# Patient Record
Sex: FEMALE | Race: WHITE | NOT HISPANIC OR LATINO | Employment: OTHER | URBAN - METROPOLITAN AREA
[De-identification: names, ages, dates, MRNs, and addresses within clinical notes are randomized per-mention and may not be internally consistent; named-entity substitution may affect disease eponyms.]

---

## 2017-01-16 ENCOUNTER — OFFICE VISIT (OUTPATIENT)
Dept: FAMILY MEDICINE | Facility: CLINIC | Age: 47
End: 2017-01-16
Payer: MEDICAID

## 2017-01-16 VITALS
DIASTOLIC BLOOD PRESSURE: 60 MMHG | TEMPERATURE: 99 F | OXYGEN SATURATION: 97 % | WEIGHT: 126.75 LBS | HEART RATE: 97 BPM | SYSTOLIC BLOOD PRESSURE: 106 MMHG | BODY MASS INDEX: 23.32 KG/M2 | HEIGHT: 62 IN

## 2017-01-16 DIAGNOSIS — J30.89 NON-SEASONAL ALLERGIC RHINITIS DUE TO OTHER ALLERGIC TRIGGER: ICD-10-CM

## 2017-01-16 DIAGNOSIS — J02.9 PHARYNGITIS, UNSPECIFIED ETIOLOGY: Primary | ICD-10-CM

## 2017-01-16 PROCEDURE — 99213 OFFICE O/P EST LOW 20 MIN: CPT | Mod: 25,S$GLB,, | Performed by: NURSE PRACTITIONER

## 2017-01-16 PROCEDURE — 96372 THER/PROPH/DIAG INJ SC/IM: CPT | Mod: S$GLB,,, | Performed by: NURSE PRACTITIONER

## 2017-01-16 RX ORDER — TRIAMCINOLONE ACETONIDE 40 MG/ML
80 INJECTION, SUSPENSION INTRA-ARTICULAR; INTRAMUSCULAR
Status: COMPLETED | OUTPATIENT
Start: 2017-01-16 | End: 2017-01-16

## 2017-01-16 RX ORDER — CODEINE PHOSPHATE AND GUAIFENESIN 10; 100 MG/5ML; MG/5ML
5 SOLUTION ORAL 3 TIMES DAILY PRN
Qty: 180 ML | Refills: 0 | Status: SHIPPED | OUTPATIENT
Start: 2017-01-16 | End: 2017-01-19 | Stop reason: SDUPTHER

## 2017-01-16 RX ORDER — FOLIC ACID 1 MG/1
TABLET ORAL
Refills: 2 | COMMUNITY
Start: 2016-12-26 | End: 2017-03-09 | Stop reason: SDUPTHER

## 2017-01-16 RX ORDER — AMOXICILLIN 875 MG/1
875 TABLET, FILM COATED ORAL 2 TIMES DAILY
Qty: 20 TABLET | Refills: 0 | Status: SHIPPED | OUTPATIENT
Start: 2017-01-16 | End: 2017-01-26

## 2017-01-16 RX ADMIN — TRIAMCINOLONE ACETONIDE 80 MG: 40 INJECTION, SUSPENSION INTRA-ARTICULAR; INTRAMUSCULAR at 10:01

## 2017-01-16 NOTE — PROGRESS NOTES
Subjective:       Patient ID: Ayana Anthony is a 47 y.o. female.    Chief Complaint: Sore Throat; Cough; and Nasal Congestion    Sore Throat    The current episode started in the past 7 days. The problem has been unchanged. Neither side of throat is experiencing more pain than the other. The maximum temperature recorded prior to her arrival was 102 - 102.9 F. The fever has been present for less than 1 day. The pain is at a severity of 8/10. The pain is moderate. Associated symptoms include congestion, coughing, a hoarse voice, swollen glands and trouble swallowing. Pertinent negatives include no abdominal pain, diarrhea, drooling, ear discharge, ear pain, headaches, plugged ear sensation, neck pain, shortness of breath, stridor or vomiting. She has had no exposure to strep or mono. Treatments tried: OTC cough medicine, tylenol. The treatment provided mild relief.   Cough   This is a new problem. The current episode started in the past 7 days. The problem has been unchanged. The problem occurs every few minutes. The cough is non-productive. Associated symptoms include chills, a fever, myalgias, nasal congestion, postnasal drip and a sore throat. Pertinent negatives include no chest pain, ear congestion, ear pain, headaches, heartburn, hemoptysis, rash, rhinorrhea, shortness of breath, sweats, weight loss or wheezing. The symptoms are aggravated by lying down. Treatments tried: OTC cough medicine. The treatment provided no relief. There is no history of asthma, bronchiectasis, bronchitis, COPD, emphysema, environmental allergies or pneumonia.     Review of Systems   Constitutional: Positive for chills, diaphoresis, fatigue and fever. Negative for weight loss.   HENT: Positive for congestion, hoarse voice, postnasal drip, sore throat, trouble swallowing and voice change. Negative for drooling, ear discharge, ear pain, rhinorrhea, sinus pressure and sneezing.    Eyes: Negative for photophobia and visual disturbance.    Respiratory: Positive for cough. Negative for hemoptysis, chest tightness, shortness of breath, wheezing and stridor.    Cardiovascular: Negative for chest pain, palpitations and leg swelling.   Gastrointestinal: Negative for abdominal pain, diarrhea, heartburn and vomiting.   Musculoskeletal: Positive for myalgias. Negative for neck pain and neck stiffness.   Skin: Negative for color change, pallor, rash and wound.   Allergic/Immunologic: Negative for environmental allergies.   Neurological: Negative for dizziness, weakness, light-headedness and headaches.       Objective:      Physical Exam   Constitutional: She is oriented to person, place, and time. She appears well-developed and well-nourished. No distress.   HENT:   Right Ear: Tympanic membrane and external ear normal.   Left Ear: Tympanic membrane and external ear normal.   Nose: No mucosal edema or rhinorrhea. Right sinus exhibits no maxillary sinus tenderness and no frontal sinus tenderness. Left sinus exhibits no maxillary sinus tenderness and no frontal sinus tenderness.   Mouth/Throat: Posterior oropharyngeal edema and posterior oropharyngeal erythema present. No oropharyngeal exudate.   Cardiovascular: Normal rate, regular rhythm and normal heart sounds.    Pulmonary/Chest: Effort normal and breath sounds normal.   Lymphadenopathy:        Head (right side): Submandibular adenopathy present.        Head (left side): Submandibular adenopathy present.   Neurological: She is alert and oriented to person, place, and time.   Skin: Skin is warm and dry. She is not diaphoretic.   Psychiatric: She has a normal mood and affect. Her behavior is normal. Judgment and thought content normal.   Vitals reviewed.      Assessment:       1. Pharyngitis, unspecified etiology    2. Non-seasonal allergic rhinitis due to other allergic trigger        Plan:       Pharyngitis, unspecified etiology  -     amoxicillin (AMOXIL) 875 MG tablet; Take 1 tablet (875 mg total) by  mouth 2 (two) times daily.  Dispense: 20 tablet; Refill: 0    Non-seasonal allergic rhinitis due to other allergic trigger    Other orders  -     triamcinolone acetonide injection 80 mg; Inject 2 mLs (80 mg total) into the muscle one time.  -     guaifenesin-codeine 100-10 mg/5 ml (CHERATUSSIN AC)  mg/5 mL syrup; Take 5 mLs by mouth 3 (three) times daily as needed.  Dispense: 180 mL; Refill: 0      Warm salt water gargles  Continue tylenol for fever  hydrate well

## 2017-01-19 RX ORDER — CODEINE PHOSPHATE AND GUAIFENESIN 10; 100 MG/5ML; MG/5ML
SOLUTION ORAL
Qty: 180 ML | Refills: 0 | Status: SHIPPED | OUTPATIENT
Start: 2017-01-19 | End: 2017-01-19 | Stop reason: SDUPTHER

## 2017-01-19 NOTE — TELEPHONE ENCOUNTER
Please send to [facility] in [place]     guaifenesin-codeine 100-10 mg/5 ml (TUSSI-ORGANIDIN NR)  mg/5 mL syrup

## 2017-01-21 RX ORDER — CODEINE PHOSPHATE AND GUAIFENESIN 10; 100 MG/5ML; MG/5ML
5 SOLUTION ORAL EVERY 8 HOURS PRN
Qty: 180 ML | Refills: 0 | Status: SHIPPED | OUTPATIENT
Start: 2017-01-21 | End: 2017-02-03 | Stop reason: SDUPTHER

## 2017-02-01 ENCOUNTER — OFFICE VISIT (OUTPATIENT)
Dept: FAMILY MEDICINE | Facility: CLINIC | Age: 47
End: 2017-02-01
Payer: MEDICAID

## 2017-02-01 VITALS
HEART RATE: 89 BPM | HEIGHT: 62 IN | BODY MASS INDEX: 23.82 KG/M2 | DIASTOLIC BLOOD PRESSURE: 76 MMHG | TEMPERATURE: 98 F | OXYGEN SATURATION: 97 % | SYSTOLIC BLOOD PRESSURE: 122 MMHG | WEIGHT: 129.44 LBS

## 2017-02-01 DIAGNOSIS — E55.9 VITAMIN D DEFICIENCY: ICD-10-CM

## 2017-02-01 DIAGNOSIS — M54.9 BACK PAIN, UNSPECIFIED BACK LOCATION, UNSPECIFIED BACK PAIN LATERALITY, UNSPECIFIED CHRONICITY: ICD-10-CM

## 2017-02-01 DIAGNOSIS — M54.2 CHRONIC NECK PAIN: ICD-10-CM

## 2017-02-01 DIAGNOSIS — M54.2 NECK PAIN: ICD-10-CM

## 2017-02-01 DIAGNOSIS — D50.0 IRON DEFICIENCY ANEMIA SECONDARY TO BLOOD LOSS (CHRONIC): ICD-10-CM

## 2017-02-01 DIAGNOSIS — K13.0 ANGULAR CHEILOSIS: ICD-10-CM

## 2017-02-01 DIAGNOSIS — R53.83 FATIGUE, UNSPECIFIED TYPE: ICD-10-CM

## 2017-02-01 DIAGNOSIS — D53.0 ANEMIA DUE TO PROTEIN DEFICIENCY: ICD-10-CM

## 2017-02-01 DIAGNOSIS — E53.8 B12 DEFICIENCY: ICD-10-CM

## 2017-02-01 DIAGNOSIS — G89.29 CHRONIC NECK PAIN: ICD-10-CM

## 2017-02-01 DIAGNOSIS — M54.2 CERVICALGIA: Chronic | ICD-10-CM

## 2017-02-01 DIAGNOSIS — D51.9 ANEMIA DUE TO VITAMIN B12 DEFICIENCY, UNSPECIFIED B12 DEFICIENCY TYPE: Chronic | ICD-10-CM

## 2017-02-01 DIAGNOSIS — G89.29 OTHER CHRONIC PAIN: ICD-10-CM

## 2017-02-01 DIAGNOSIS — M79.641 PAIN OF RIGHT HAND: Primary | ICD-10-CM

## 2017-02-01 DIAGNOSIS — G47.00 INSOMNIA, UNSPECIFIED TYPE: Chronic | ICD-10-CM

## 2017-02-01 DIAGNOSIS — Z12.39 BREAST CANCER SCREENING: ICD-10-CM

## 2017-02-01 DIAGNOSIS — F32.A DEPRESSION, UNSPECIFIED DEPRESSION TYPE: Chronic | ICD-10-CM

## 2017-02-01 DIAGNOSIS — D50.9 IRON DEFICIENCY ANEMIA, UNSPECIFIED IRON DEFICIENCY ANEMIA TYPE: ICD-10-CM

## 2017-02-01 DIAGNOSIS — G25.81 RLS (RESTLESS LEGS SYNDROME): Chronic | ICD-10-CM

## 2017-02-01 DIAGNOSIS — G40.309 NONINTRACTABLE GENERALIZED IDIOPATHIC EPILEPSY WITHOUT STATUS EPILEPTICUS: Chronic | ICD-10-CM

## 2017-02-01 PROCEDURE — 99214 OFFICE O/P EST MOD 30 MIN: CPT | Mod: S$GLB,,, | Performed by: FAMILY MEDICINE

## 2017-02-01 RX ORDER — ESTROGENS, CONJUGATED 1.25 MG
TABLET ORAL
Qty: 30 TABLET | Refills: 5 | Status: SHIPPED | OUTPATIENT
Start: 2017-02-01 | End: 2017-07-24 | Stop reason: SDUPTHER

## 2017-02-01 RX ORDER — DICLOFENAC SODIUM 10 MG/G
GEL TOPICAL
Qty: 100 G | Refills: 5 | Status: SHIPPED | OUTPATIENT
Start: 2017-02-01 | End: 2018-06-29

## 2017-02-01 RX ORDER — OXYCODONE HYDROCHLORIDE 30 MG/1
TABLET ORAL
Qty: 150 TABLET | Refills: 0 | Status: SHIPPED | OUTPATIENT
Start: 2017-02-01 | End: 2017-03-01 | Stop reason: SDUPTHER

## 2017-02-01 RX ORDER — CYANOCOBALAMIN 1000 UG/ML
INJECTION, SOLUTION INTRAMUSCULAR; SUBCUTANEOUS
Qty: 10 ML | Refills: 2 | Status: SHIPPED | OUTPATIENT
Start: 2017-02-01 | End: 2018-01-05 | Stop reason: SDUPTHER

## 2017-02-01 RX ORDER — ESTROGENS, CONJUGATED 1.25 MG
TABLET ORAL
Refills: 0 | COMMUNITY
Start: 2017-01-16 | End: 2017-02-01 | Stop reason: SDUPTHER

## 2017-02-01 RX ORDER — DIAZEPAM 10 MG/1
10 TABLET ORAL NIGHTLY PRN
Qty: 30 TABLET | Refills: 5 | Status: SHIPPED | OUTPATIENT
Start: 2017-02-01 | End: 2017-08-02 | Stop reason: SDUPTHER

## 2017-02-01 NOTE — MR AVS SNAPSHOT
[facility]  [address] [place] [address]  [place] [address]  Phone: [phone]  Fax: [phone]                  Ayana Anthony   2017 2:00 PM   Office Visit    Description:  Female : 1970   Provider:  Elieser Rollins MD   [facility]           Reason for Visit     Follow-up           Diagnoses this Visit        Comments    Pain of right hand    -  Primary     Chronic neck pain         Back pain, unspecified back location, unspecified back pain laterality, unspecified chronicity         Neck pain         Other chronic pain         Cervicalgia         Depression, unspecified depression type         Nonintractable generalized idiopathic epilepsy without status epilepticus         Insomnia, unspecified type         Angular cheilosis         Iron deficiency anemia secondary to blood loss (chronic)         Iron deficiency anemia, unspecified iron deficiency anemia type         Anemia due to vitamin B12 deficiency, unspecified B12 deficiency type         Anemia due to protein deficiency         B12 deficiency         Vitamin D deficiency         Fatigue, unspecified type         RLS (restless legs syndrome)                To Do List           Goals (5 Years of Data)     None      Follow-Up and Disposition     Return in about 6 months (around 2017).       These Medications        Disp Refills Start End    cyanocobalamin 1,000 mcg/mL injection 10 mL 2 2017     Inject 1 mL (1,000 mcg total) into the skin every 28 days.    Pharmacy: [facility] [address] Ph #: [phone]       diclofenac sodium (VOLTAREN) 1 % Gel 100 g 5 2017     Apply 3 times daily prn    Pharmacy: [facility] [address] Ph #: [phone]       oxycodone (ROXICODONE) 30 MG Tab 150 tablet 0 2017     5 a day    Pharmacy: [facility], [place] - [address] Ph #: [phone]       diazePAM (VALIUM) 10 MG  Tab 30 tablet 5 2/1/2017     Take 1 tablet (10 mg total) by mouth nightly as needed. - Oral    Pharmacy: [facility], [address] #: [phone]       PREMARIN 1.25 mg tablet 30 tablet 5 2/1/2017     Take 1 tablet (1.25 mg total) by mouth once daily.    Pharmacy: [facility], [place] - [address] [facility] Ph #: [phone]         [facility] On Call     [facility] On Call Nurse Care Line - 24/7 Assistance  Registered nurses in the [facility] provide clinical advisement, health education, appointment booking, and other advisory services.  Call for this free service at [phone].             Medications           Message regarding Medications     Verify the changes and/or additions to your medication regime listed below are the same as discussed with your clinician today.  If any of these changes or additions are incorrect, please notify your healthcare provider.        START taking these NEW medications        Refills    PREMARIN 1.25 mg tablet 5    Sig: Take 1 tablet (1.25 mg total) by mouth once daily.    Class: Normal      CHANGE how you are taking these medications     Start Taking Instead of    oxycodone (ROXICODONE) 30 MG Tab oxycodone (ROXICODONE) 30 MG Tab    Dosage:  5 a day Dosage:  every 6 (six) hours as needed.     Reason for Change:  Reorder     diazePAM (VALIUM) 10 MG Tab diazepam (VALIUM) 10 MG Tab    Dosage:  Take 1 tablet (10 mg total) by mouth nightly as needed. Dosage:  Take 10 mg by mouth nightly as needed.    Reason for Change:  Reorder       STOP taking these medications     estradiol (ESTRACE) 2 MG tablet Take 1 tablet (2 mg total) by mouth once daily.           Verify that the below list of medications is an accurate representation of the medications you are currently taking.  If none reported, the list may be blank. If incorrect, please contact your healthcare provider. Carry this list with you in case of emergency.           Current  Medications     albuterol (PROAIR HFA) 90 mcg/actuation inhaler Inhale 2 puffs into the lungs every 4 (four) hours as needed for Wheezing or Shortness of Breath.    amlodipine (NORVASC) 10 MG tablet Take 1 tablet (10 mg total) by mouth once daily.    clonazepam (KLONOPIN) 0.25 MG TbDL Take 1 tablet (0.25 mg total) by mouth 2 (two) times daily as needed (anxiety).    cyanocobalamin 1,000 mcg/mL injection Inject 1 mL (1,000 mcg total) into the skin every 28 days.    diazePAM (VALIUM) 10 MG Tab Take 1 tablet (10 mg total) by mouth nightly as needed.    diclofenac sodium (VOLTAREN) 1 % Gel Apply 3 times daily prn    fluticasone (FLONASE) 50 mcg/actuation nasal spray TWO SPRAYS IN EACH NOSTRIL ONCE DAILY    folic acid (FOLVITE) 1 MG tablet TAKE ONE Tablet BY MOUTH EVERY DAY THANK YOU    gabapentin (NEURONTIN) 300 MG capsule Take 1 capsule (300 mg total) by mouth 2 (two) times daily.    guaifenesin-codeine 100-10 mg/5 ml (TUSSI-ORGANIDIN NR)  mg/5 mL syrup Take 5 mLs by mouth every 8 (eight) hours as needed for Cough.    hydrochlorothiazide (HYDRODIURIL) 25 MG tablet Take 1 tablet (25 mg total) by mouth once daily.    hydrOXYzine (VISTARIL) 50 MG Cap Take 50 mg by mouth once daily.    hyoscyamine (ANASPAZ,LEVSIN) 0.125 mg Tab Take 1 tablet (125 mcg total) by mouth every 4 (four) hours as needed. For bladder spasm    olanzapine (ZYPREXA) 2.5 MG tablet Take 1 tablet (2.5 mg total) by mouth every evening.    oxcarbazepine (TRILEPTAL) 600 MG Tab Take 900 mg by mouth 2 (two) times daily. 1 1/2 am 1 1/2 pm     oxycodone (ROXICODONE) 30 MG Tab 5 a day    potassium chloride (MICRO-K) 10 MEQ CpSR Take 2 capsules (20 mEq total) by mouth 3 (three) times daily.    PREMARIN 1.25 mg tablet Take 1 tablet (1.25 mg total) by mouth once daily.    promethazine (PHENERGAN) 12.5 MG Tab Take 1 tablet (12.5 mg total) by mouth every 6 (six) hours as needed (nausea).    ropinirole (REQUIP) 1 MG tablet Take 1 mg by mouth 2 (two) times daily.  "   sertraline (ZOLOFT) 50 MG tablet Take 1 tablet (50 mg total) by mouth once daily.    trazodone (DESYREL) 150 MG tablet Take 1 tablet (150 mg total) by mouth nightly as needed for Insomnia.           Clinical Reference Information           Vital Signs - Last Recorded  Most recent update: 2/1/2017  2:10 PM by Nate Hall LPN    BP Pulse Temp Ht Wt SpO2    122/76 89 98.4 °F (36.9 °C) (Oral) 5' 2" (1.575 m) 58.7 kg (129 lb 6.6 oz) 97%    BMI                23.67 kg/m2          Blood Pressure          Most Recent Value    BP  122/76      Allergies as of 2/1/2017     Bactrim [Sulfamethoxazole-trimethoprim]    Tramadol    Vancomycin Analogues      Immunizations Administered on Date of Encounter - 2/1/2017     None      Orders Placed During Today's Visit      Normal Orders This Visit    Ambulatory referral to Pain Clinic     POCT RAPID DRUG SCREEN       "
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
## 2017-02-03 NOTE — TELEPHONE ENCOUNTER
Please send to [facility] in [place]     guaifenesin-codeine 100-10 mg/5 ml (TUSSI-ORGANIDIN NR)  mg/5 mL syrup  Take 5 mLs by mouth every 8 (eight) hours as needed for Cough.   Normal, Disp-180 mL, R-0   Generic For:*ROBITUSSIN A-C SYRUP  01/19/2017 3:29:08 PM

## 2017-02-05 RX ORDER — CODEINE PHOSPHATE AND GUAIFENESIN 10; 100 MG/5ML; MG/5ML
5 SOLUTION ORAL EVERY 8 HOURS PRN
Qty: 180 ML | Refills: 0 | Status: SHIPPED | OUTPATIENT
Start: 2017-02-05 | End: 2017-04-21 | Stop reason: ALTCHOICE

## 2017-02-06 ENCOUNTER — DOCUMENTATION ONLY (OUTPATIENT)
Dept: ADMINISTRATIVE | Facility: OTHER | Age: 47
End: 2017-02-06

## 2017-02-06 NOTE — PROGRESS NOTES
Please note the following patient's information has been forwarded to the [employer] case Mgmt team for Case Management or .      Please see the media tab in EPIC for additional referral information.    Please contact Outpatient Complex Care Management at ext [phone] with questions.    Thank you    Marla Shelby, SSC

## 2017-02-07 ENCOUNTER — TELEPHONE (OUTPATIENT)
Dept: FAMILY MEDICINE | Facility: CLINIC | Age: 47
End: 2017-02-07

## 2017-02-07 RX ORDER — BENZONATATE 200 MG/1
200 CAPSULE ORAL 3 TIMES DAILY PRN
Qty: 30 CAPSULE | Refills: 2 | Status: SHIPPED | OUTPATIENT
Start: 2017-02-07 | End: 2017-02-17

## 2017-02-07 NOTE — TELEPHONE ENCOUNTER
----- Message from Estefany Pollock sent at 2/7/2017  3:15 PM CST -----  Contact: Pt [phone]  Patient states that guaifenesin-codeine 100-10 mg/5 ml (TUSSI-ORGANIDIN NR)  mg/5 mL syrup is not working. Patient would like to know if there is something else can be called in. Patient states she's still wheezing

## 2017-02-21 ENCOUNTER — TELEPHONE (OUTPATIENT)
Dept: FAMILY MEDICINE | Facility: CLINIC | Age: 47
End: 2017-02-21

## 2017-02-21 NOTE — TELEPHONE ENCOUNTER
Pt called about getting an apt for her refills.  I explained to the pt that she needed to see a pain management MD in order to get her refills.  She states that she does not want the treatment, she only needs the meds.      I reached out to [facility] - to ask if they were able to help the pt.  Due to her insurance they had to forward on the request to [place].  They advised that the pt call the number on the back of her card to verify which pain mgmt MD she can see and to speak to a .      I notified the pt of my conversation with the case mgmt team.  She voiced understanding and states that she will call them.

## 2017-02-22 ENCOUNTER — TELEPHONE (OUTPATIENT)
Dept: FAMILY MEDICINE | Facility: CLINIC | Age: 47
End: 2017-02-22

## 2017-02-22 DIAGNOSIS — M54.2 CERVICALGIA: Primary | ICD-10-CM

## 2017-02-22 DIAGNOSIS — M54.2 NECK PAIN: ICD-10-CM

## 2017-02-22 DIAGNOSIS — R53.83 FATIGUE, UNSPECIFIED TYPE: ICD-10-CM

## 2017-02-22 DIAGNOSIS — M54.9 BACK PAIN, UNSPECIFIED BACK LOCATION, UNSPECIFIED BACK PAIN LATERALITY, UNSPECIFIED CHRONICITY: ICD-10-CM

## 2017-02-22 DIAGNOSIS — G25.81 RLS (RESTLESS LEGS SYNDROME): ICD-10-CM

## 2017-02-22 DIAGNOSIS — G40.309 NONINTRACTABLE GENERALIZED IDIOPATHIC EPILEPSY WITHOUT STATUS EPILEPTICUS: ICD-10-CM

## 2017-02-22 DIAGNOSIS — M79.641 PAIN OF RIGHT HAND: ICD-10-CM

## 2017-02-22 NOTE — TELEPHONE ENCOUNTER
----- Message from Estefany Pollock sent at 2/22/2017  3:14 PM CST -----  Contact: Pt [phone]  Patient states she found a pain management doctor that takes her insurance. Patient doesn't remember the name of doctor but has a fax number [phone]

## 2017-02-24 ENCOUNTER — OFFICE VISIT (OUTPATIENT)
Dept: FAMILY MEDICINE | Facility: CLINIC | Age: 47
End: 2017-02-24
Payer: MEDICAID

## 2017-02-24 VITALS
BODY MASS INDEX: 23.4 KG/M2 | SYSTOLIC BLOOD PRESSURE: 98 MMHG | RESPIRATION RATE: 18 BRPM | DIASTOLIC BLOOD PRESSURE: 70 MMHG | WEIGHT: 127.19 LBS | OXYGEN SATURATION: 96 % | HEIGHT: 62 IN | HEART RATE: 84 BPM | TEMPERATURE: 99 F

## 2017-02-24 DIAGNOSIS — G47.00 INSOMNIA, UNSPECIFIED TYPE: Primary | ICD-10-CM

## 2017-02-24 DIAGNOSIS — M79.641 PAIN OF RIGHT HAND: ICD-10-CM

## 2017-02-24 DIAGNOSIS — G89.29 OTHER CHRONIC PAIN: ICD-10-CM

## 2017-02-24 DIAGNOSIS — T23.172A: ICD-10-CM

## 2017-02-24 PROCEDURE — 99214 OFFICE O/P EST MOD 30 MIN: CPT | Mod: S$GLB,,, | Performed by: NURSE PRACTITIONER

## 2017-02-24 RX ORDER — ZOLPIDEM TARTRATE 5 MG/1
5 TABLET ORAL NIGHTLY PRN
Qty: 30 TABLET | Refills: 0 | Status: SHIPPED | OUTPATIENT
Start: 2017-02-24 | End: 2017-03-09

## 2017-02-24 RX ORDER — SILVER SULFADIAZINE 10 G/1000G
CREAM TOPICAL 2 TIMES DAILY
Qty: 85 G | Refills: 2 | Status: SHIPPED | OUTPATIENT
Start: 2017-02-24 | End: 2017-03-28
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
## 2017-02-27 ENCOUNTER — TELEPHONE (OUTPATIENT)
Dept: FAMILY MEDICINE | Facility: CLINIC | Age: 47
End: 2017-02-27

## 2017-02-27 NOTE — TELEPHONE ENCOUNTER
----- Message from Sintia Jin MA sent at 2/27/2017  9:30 AM CST -----  Contact: Self / [phone]  Patient seen on Friday by Lisette. States that a message was suppose to be sent to get a refill on her cough syrup with codeine and also to get a refill on her pain medication. Patient states she can not get an appointment with pain management until April. Please advise.

## 2017-03-01 ENCOUNTER — TELEPHONE (OUTPATIENT)
Dept: FAMILY MEDICINE | Facility: CLINIC | Age: 47
End: 2017-03-01

## 2017-03-01 RX ORDER — OXYCODONE HYDROCHLORIDE 30 MG/1
TABLET ORAL
Qty: 150 TABLET | Refills: 0 | Status: SHIPPED | OUTPATIENT
Start: 2017-03-01 | End: 2017-03-28 | Stop reason: SDUPTHER

## 2017-03-01 NOTE — TELEPHONE ENCOUNTER
----- Message from Estefany Pollock sent at 3/1/2017  2:40 PM CST -----  Contact: Pt [phone]  Patient would like a call from Dr. Rollins. Patient states she can't get in with Pain management at [facility] until April but she doesn't have a appointment scheduled yet due to paper being faxed over from this office to the pain management clinic at [facility]. Patient states she just need pain medication for one more month. Please send to [facility].

## 2017-03-09 ENCOUNTER — OFFICE VISIT (OUTPATIENT)
Dept: FAMILY MEDICINE | Facility: CLINIC | Age: 47
End: 2017-03-09
Payer: MEDICAID

## 2017-03-09 VITALS
BODY MASS INDEX: 22.44 KG/M2 | WEIGHT: 121.94 LBS | HEART RATE: 100 BPM | HEIGHT: 62 IN | DIASTOLIC BLOOD PRESSURE: 84 MMHG | TEMPERATURE: 99 F | SYSTOLIC BLOOD PRESSURE: 128 MMHG | OXYGEN SATURATION: 99 %

## 2017-03-09 DIAGNOSIS — I10 ESSENTIAL HYPERTENSION: Primary | Chronic | ICD-10-CM

## 2017-03-09 DIAGNOSIS — G40.309 NONINTRACTABLE GENERALIZED IDIOPATHIC EPILEPSY WITHOUT STATUS EPILEPTICUS: Chronic | ICD-10-CM

## 2017-03-09 LAB
AMPHET+METHAMPHET UR QL: NEGATIVE
BARBITURATES UR QL SCN>200 NG/ML: NEGATIVE
BENZODIAZ UR QL SCN>200 NG/ML: NORMAL
BZE UR QL SCN: NEGATIVE
CANNABINOIDS UR QL SCN: NEGATIVE
CREAT UR-MCNC: 316 MG/DL
ETHANOL UR-MCNC: <10 MG/DL
METHADONE UR QL SCN>300 NG/ML: NEGATIVE
OPIATES UR QL SCN: NORMAL
PCP UR QL SCN>25 NG/ML: NEGATIVE
TOXICOLOGY INFORMATION: NORMAL

## 2017-03-09 PROCEDURE — 80307 DRUG TEST PRSMV CHEM ANLYZR: CPT

## 2017-03-09 PROCEDURE — 99213 OFFICE O/P EST LOW 20 MIN: CPT | Mod: S$GLB,,, | Performed by: FAMILY MEDICINE

## 2017-03-09 RX ORDER — FOLIC ACID 1 MG/1
TABLET ORAL
Qty: 90 TABLET | Refills: 3 | Status: SHIPPED | OUTPATIENT
Start: 2017-03-09 | End: 2018-02-14 | Stop reason: SDUPTHER

## 2017-03-09 RX ORDER — POTASSIUM CHLORIDE 750 MG/1
20 CAPSULE, EXTENDED RELEASE ORAL 3 TIMES DAILY
Qty: 540 CAPSULE | Refills: 3 | Status: SHIPPED | OUTPATIENT
Start: 2017-03-09

## 2017-03-09 RX ORDER — HYDROCHLOROTHIAZIDE 25 MG/1
25 TABLET ORAL DAILY
Qty: 90 TABLET | Refills: 3 | Status: SHIPPED | OUTPATIENT
Start: 2017-03-09 | End: 2018-02-14 | Stop reason: SDUPTHER

## 2017-03-09 RX ORDER — SERTRALINE HYDROCHLORIDE 100 MG/1
200 TABLET, FILM COATED ORAL DAILY
Qty: 60 TABLET | Refills: 5 | Status: SHIPPED | OUTPATIENT
Start: 2017-03-09 | End: 2017-08-22 | Stop reason: SDUPTHER

## 2017-03-09 RX ORDER — ONDANSETRON 4 MG/1
4 TABLET, ORALLY DISINTEGRATING ORAL EVERY 8 HOURS PRN
Qty: 20 TABLET | Refills: 0 | Status: SHIPPED | OUTPATIENT
Start: 2017-03-09 | End: 2017-06-26

## 2017-03-09 RX ORDER — TRAZODONE HYDROCHLORIDE 150 MG/1
150 TABLET ORAL NIGHTLY PRN
Qty: 90 TABLET | Refills: 3 | Status: SHIPPED | OUTPATIENT
Start: 2017-03-09 | End: 2018-04-13

## 2017-03-09 RX ORDER — DIPHENOXYLATE HYDROCHLORIDE AND ATROPINE SULFATE 2.5; .025 MG/1; MG/1
1 TABLET ORAL 4 TIMES DAILY PRN
Qty: 20 TABLET | Refills: 1 | Status: SHIPPED | OUTPATIENT
Start: 2017-03-09 | End: 2017-03-19

## 2017-03-09 RX ORDER — AMLODIPINE BESYLATE 10 MG/1
10 TABLET ORAL DAILY
Qty: 90 TABLET | Refills: 3 | Status: SHIPPED | OUTPATIENT
Start: 2017-03-09 | End: 2018-02-14 | Stop reason: SDUPTHER

## 2017-03-09 NOTE — MR AVS SNAPSHOT
[address]  [place] [address]  Phone: [phone]  Fax: [phone]                  Ayana Anthony   3/9/2017 2:40 PM   Office Visit    Description:  Female : 1970   Provider:  Elieser Rollins MD   Department:  [facility]           Reason for Visit     Follow-up     Medication Refill           Diagnoses this Visit        Comments    Essential hypertension    -  Primary     Nonintractable generalized idiopathic epilepsy without status epilepticus                To Do List           Goals (5 Years of Data)     None      Follow-Up and Disposition     Return in about 3 months (around 2017).       These Medications        Disp Refills Start End    potassium chloride (MICRO-K) 10 MEQ CpSR 540 capsule 3 3/9/2017     Take 2 capsules (20 mEq total) by mouth 3 (three) times daily. - Oral    Pharmacy: [facility], [place] - [address] Ph #: [phone]       Notes to Pharmacy: This prescription was filled today. Any refills authorized will be placed on file.    folic acid (FOLVITE) 1 MG tablet 90 tablet 3 3/9/2017     TAKE ONE Tablet BY MOUTH EVERY DAY THANK YOU    Pharmacy: [facility], [place] - [address] Ph #: [phone]       amlodipine (NORVASC) 10 MG tablet 90 tablet 3 3/9/2017 3/9/2018    Take 1 tablet (10 mg total) by mouth once daily. - Oral    Pharmacy: [facility], [place] - [address] Ph #: [phone]       hydrochlorothiazide (HYDRODIURIL) 25 MG tablet 90 tablet 3 3/9/2017 3/9/2018    Take 1 tablet (25 mg total) by mouth once daily. - Oral    Pharmacy: [facility], [place] - [address] Ph #: [phone]       sertraline (ZOLOFT) 100 MG tablet 60 tablet 5 3/9/2017 3/9/2018    Take 2 tablets (200 mg total) by mouth once daily. - Oral    Pharmacy: [facility], [place] - [address] Ph #: [phone]       trazodone (DESYREL) 150 MG tablet 90  tablet 3 3/9/2017 3/9/2018    Take 1 tablet (150 mg total) by mouth nightly as needed for Insomnia. - Oral    Pharmacy: [address] Ph #: [phone]       ondansetron (ZOFRAN-ODT) 4 MG TbDL 20 tablet 0 3/9/2017     Take 1 tablet (4 mg total) by mouth every 8 (eight) hours as needed. - Oral    Pharmacy: [address] Ph #: [phone]       diphenoxylate-atropine 2.5-0.025 mg (LOMOTIL) 2.5-0.025 mg per tablet 20 tablet 1 3/9/2017 3/19/2017    Take 1 tablet by mouth 4 (four) times daily as needed for Diarrhea. - Oral    Pharmacy: [address] Ph #: [phone]         [facility]     [facility] On Call Nurse Care Line - 24/7 Assistance  Registered nurses in the [facility] provide clinical advisement, health education, appointment booking, and other advisory services.  Call for this free service at [phone].             Medications           Message regarding Medications     Verify the changes and/or additions to your medication regime listed below are the same as discussed with your clinician today.  If any of these changes or additions are incorrect, please notify your healthcare provider.        START taking these NEW medications        Refills    ondansetron (ZOFRAN-ODT) 4 MG TbDL 0    Sig: Take 1 tablet (4 mg total) by mouth every 8 (eight) hours as needed.    Class: Normal    Route: Oral    diphenoxylate-atropine 2.5-0.025 mg (LOMOTIL) 2.5-0.025 mg per tablet 1    Sig: Take 1 tablet by mouth 4 (four) times daily as needed for Diarrhea.    Class: Normal    Route: Oral      CHANGE how you are taking these medications     Start Taking Instead of    sertraline (ZOLOFT) 100 MG tablet sertraline (ZOLOFT) 50 MG tablet    Dosage:  Take 2 tablets (200 mg total) by mouth once daily. Dosage:  Take 1 tablet (50 mg total) by mouth once daily.    Reason for Change:  Reorder       STOP  taking these medications     zolpidem (AMBIEN) 5 MG Tab Take 1 tablet (5 mg total) by mouth nightly as needed.           Verify that the below list of medications is an accurate representation of the medications you are currently taking.  If none reported, the list may be blank. If incorrect, please contact your healthcare provider. Carry this list with you in case of emergency.           Current Medications     albuterol (PROAIR HFA) 90 mcg/actuation inhaler Inhale 2 puffs into the lungs every 4 (four) hours as needed for Wheezing or Shortness of Breath.    amlodipine (NORVASC) 10 MG tablet Take 1 tablet (10 mg total) by mouth once daily.    clonazepam (KLONOPIN) 0.25 MG TbDL Take 1 tablet (0.25 mg total) by mouth 2 (two) times daily as needed (anxiety).    cyanocobalamin 1,000 mcg/mL injection Inject 1 mL (1,000 mcg total) into the skin every 28 days.    diazePAM (VALIUM) 10 MG Tab Take 1 tablet (10 mg total) by mouth nightly as needed.    diclofenac sodium (VOLTAREN) 1 % Gel Apply 3 times daily prn    diphenoxylate-atropine 2.5-0.025 mg (LOMOTIL) 2.5-0.025 mg per tablet Take 1 tablet by mouth 4 (four) times daily as needed for Diarrhea.    fluticasone (FLONASE) 50 mcg/actuation nasal spray TWO SPRAYS IN EACH NOSTRIL ONCE DAILY    folic acid (FOLVITE) 1 MG tablet TAKE ONE Tablet BY MOUTH EVERY DAY THANK YOU    gabapentin (NEURONTIN) 300 MG capsule Take 1 capsule (300 mg total) by mouth 2 (two) times daily.    guaifenesin-codeine 100-10 mg/5 ml (TUSSI-ORGANIDIN NR)  mg/5 mL syrup Take 5 mLs by mouth every 8 (eight) hours as needed for Cough.    hydrochlorothiazide (HYDRODIURIL) 25 MG tablet Take 1 tablet (25 mg total) by mouth once daily.    hydrOXYzine (VISTARIL) 50 MG Cap Take 50 mg by mouth once daily.    hyoscyamine (ANASPAZ,LEVSIN) 0.125 mg Tab Take 1 tablet (125 mcg total) by mouth every 4 (four) hours as needed. For bladder spasm    olanzapine (ZYPREXA) 2.5 MG tablet Take 1 tablet (2.5 mg total) by  "mouth every evening.    ondansetron (ZOFRAN-ODT) 4 MG TbDL Take 1 tablet (4 mg total) by mouth every 8 (eight) hours as needed.    oxcarbazepine (TRILEPTAL) 600 MG Tab Take 900 mg by mouth 2 (two) times daily. 1 1/2 am 1 1/2 pm     oxycodone (ROXICODONE) 30 MG Tab 5 a day    potassium chloride (MICRO-K) 10 MEQ CpSR Take 2 capsules (20 mEq total) by mouth 3 (three) times daily.    PREMARIN 1.25 mg tablet Take 1 tablet (1.25 mg total) by mouth once daily.    promethazine (PHENERGAN) 12.5 MG Tab Take 1 tablet (12.5 mg total) by mouth every 6 (six) hours as needed (nausea).    ropinirole (REQUIP) 1 MG tablet Take 1 mg by mouth 2 (two) times daily.    sertraline (ZOLOFT) 100 MG tablet Take 2 tablets (200 mg total) by mouth once daily.    silver sulfADIAZINE 1% (SILVADENE) 1 % cream Apply topically 2 (two) times daily.    tiotropium-olodaterol 2.5-2.5 mcg/actuation Mist Inhale 1 puff into the lungs once daily. Controller    trazodone (DESYREL) 150 MG tablet Take 1 tablet (150 mg total) by mouth nightly as needed for Insomnia.           Clinical Reference Information           Your Vitals Were     BP Pulse Temp Height Weight SpO2    128/84 100 98.8 °F (37.1 °C) (Oral) 5' 2" (1.575 m) 55.3 kg (121 lb 14.6 oz) 99%    BMI                22.3 kg/m2          Blood Pressure          Most Recent Value    BP  128/84      Allergies as of 3/9/2017     Bactrim [Sulfamethoxazole-trimethoprim]    Tramadol    Vancomycin Analogues      Immunizations Administered on Date of Encounter - 3/9/2017     None      Orders Placed During Today's Visit      Normal Orders This Visit    TOXICOLOGY SCREEN, URINE, RANDOM (COMPLIANCE)       Language Assistance Services     ATTENTION: Language assistance services are available, free of charge. Please call [phone].      ATENCIÓN: Si habla isela, tiene a moreno disposición servicios gratuitos de asistencia lingüística. Llame al [phone].     ASHLI Ý: N?u b?n nói Ti?ng Vi?t, có các d?ch v? h? tr? " katty hatch? mi?n phí dành cho b?n. G?i s? [phone].         [facility] complies with applicable Federal civil rights laws and does not discriminate on the basis of race, color, national origin, age, disability, or sex.

## 2017-03-09 NOTE — PROGRESS NOTES
Subjective:      Patient ID: Ayana Anthony is a 47 y.o. female.    Chief Complaint: Follow-up (check-up) and Medication Refill    HPI Comments: Sick and refills; [facility] asked for refills; n,v d; no bleeding for 3 days; no Rx; poweraid;     Medication Refill       Review of Systems   Constitutional: Negative.    HENT: Negative.    Respiratory: Negative.    Cardiovascular: Negative.    Gastrointestinal: Negative.    Endocrine: Negative.    Genitourinary: Negative.    Musculoskeletal: Negative.    Psychiatric/Behavioral: Negative.    All other systems reviewed and are negative.    Objective:     Physical Exam   Constitutional: She is oriented to person, place, and time. She appears well-developed and well-nourished.   HENT:   Head: Normocephalic.   Eyes: Conjunctivae and EOM are normal. Pupils are equal, round, and reactive to light.   Neck: Normal range of motion. Neck supple.   Cardiovascular: Normal rate, regular rhythm and normal heart sounds.    Pulmonary/Chest: Effort normal and breath sounds normal.   Musculoskeletal: Normal range of motion.   Neurological: She is alert and oriented to person, place, and time. She has normal reflexes.   Skin: Skin is warm and dry.   Psychiatric: She has a normal mood and affect. Her behavior is normal. Judgment and thought content normal.   Nursing note and vitals reviewed.    Assessment:     1. Essential hypertension    2. Nonintractable generalized idiopathic epilepsy without status epilepticus      Plan:     New Prescriptions    DIPHENOXYLATE-ATROPINE 2.5-0.025 MG (LOMOTIL) 2.5-0.025 MG PER TABLET    Take 1 tablet by mouth 4 (four) times daily as needed for Diarrhea.    ONDANSETRON (ZOFRAN-ODT) 4 MG TBDL    Take 1 tablet (4 mg total) by mouth every 8 (eight) hours as needed.     Discontinued Medications    ZOLPIDEM (AMBIEN) 5 MG TAB    Take 1 tablet (5 mg total) by mouth nightly as needed.     Modified Medications    Modified Medication Previous Medication    AMLODIPINE  (NORVASC) 10 MG TABLET amlodipine (NORVASC) 10 MG tablet       Take 1 tablet (10 mg total) by mouth once daily.    Take 1 tablet (10 mg total) by mouth once daily.    FOLIC ACID (FOLVITE) 1 MG TABLET folic acid (FOLVITE) 1 MG tablet       TAKE ONE Tablet BY MOUTH EVERY DAY THANK YOU    TAKE ONE Tablet BY MOUTH EVERY DAY THANK YOU    HYDROCHLOROTHIAZIDE (HYDRODIURIL) 25 MG TABLET hydrochlorothiazide (HYDRODIURIL) 25 MG tablet       Take 1 tablet (25 mg total) by mouth once daily.    Take 1 tablet (25 mg total) by mouth once daily.    POTASSIUM CHLORIDE (MICRO-K) 10 MEQ CPSR potassium chloride (MICRO-K) 10 MEQ CpSR       Take 2 capsules (20 mEq total) by mouth 3 (three) times daily.    Take 2 capsules (20 mEq total) by mouth 3 (three) times daily.    SERTRALINE (ZOLOFT) 100 MG TABLET sertraline (ZOLOFT) 50 MG tablet       Take 2 tablets (200 mg total) by mouth once daily.    Take 1 tablet (50 mg total) by mouth once daily.    TRAZODONE (DESYREL) 150 MG TABLET trazodone (DESYREL) 150 MG tablet       Take 1 tablet (150 mg total) by mouth nightly as needed for Insomnia.    Take 1 tablet (150 mg total) by mouth nightly as needed for Insomnia.       Essential hypertension  -     TOXICOLOGY SCREEN, URINE, RANDOM (COMPLIANCE)    Nonintractable generalized idiopathic epilepsy without status epilepticus  -     TOXICOLOGY SCREEN, URINE, RANDOM (COMPLIANCE)    Other orders  -     potassium chloride (MICRO-K) 10 MEQ CpSR; Take 2 capsules (20 mEq total) by mouth 3 (three) times daily.  Dispense: 540 capsule; Refill: 3  -     folic acid (FOLVITE) 1 MG tablet; TAKE ONE Tablet BY MOUTH EVERY DAY THANK YOU  Dispense: 90 tablet; Refill: 3  -     amlodipine (NORVASC) 10 MG tablet; Take 1 tablet (10 mg total) by mouth once daily.  Dispense: 90 tablet; Refill: 3  -     hydrochlorothiazide (HYDRODIURIL) 25 MG tablet; Take 1 tablet (25 mg total) by mouth once daily.  Dispense: 90 tablet; Refill: 3  -     sertraline (ZOLOFT) 100 MG tablet;  Take 2 tablets (200 mg total) by mouth once daily.  Dispense: 60 tablet; Refill: 5  -     trazodone (DESYREL) 150 MG tablet; Take 1 tablet (150 mg total) by mouth nightly as needed for Insomnia.  Dispense: 90 tablet; Refill: 3  -     ondansetron (ZOFRAN-ODT) 4 MG TbDL; Take 1 tablet (4 mg total) by mouth every 8 (eight) hours as needed.  Dispense: 20 tablet; Refill: 0  -     diphenoxylate-atropine 2.5-0.025 mg (LOMOTIL) 2.5-0.025 mg per tablet; Take 1 tablet by mouth 4 (four) times daily as needed for Diarrhea.  Dispense: 20 tablet; Refill: 1

## 2017-03-20 RX ORDER — ONDANSETRON 4 MG/1
4 TABLET, ORALLY DISINTEGRATING ORAL EVERY 8 HOURS PRN
Qty: 20 TABLET | Refills: 0 | OUTPATIENT
Start: 2017-03-20

## 2017-03-20 RX ORDER — CODEINE PHOSPHATE AND GUAIFENESIN 10; 100 MG/5ML; MG/5ML
5 SOLUTION ORAL EVERY 8 HOURS PRN
Qty: 180 ML | Refills: 0 | OUTPATIENT
Start: 2017-03-20

## 2017-03-21 ENCOUNTER — HOSPITAL ENCOUNTER (EMERGENCY)
Facility: HOSPITAL | Age: 47
Discharge: HOME | End: 2017-03-21
Attending: FAMILY MEDICINE
Payer: MEDICAID

## 2017-03-21 VITALS
OXYGEN SATURATION: 97 % | BODY MASS INDEX: 22.08 KG/M2 | RESPIRATION RATE: 20 BRPM | DIASTOLIC BLOOD PRESSURE: 87 MMHG | WEIGHT: 120 LBS | HEIGHT: 62 IN | TEMPERATURE: 98 F | SYSTOLIC BLOOD PRESSURE: 125 MMHG | HEART RATE: 98 BPM

## 2017-03-21 DIAGNOSIS — L03.113 CELLULITIS OF RIGHT ELBOW: Primary | ICD-10-CM

## 2017-03-21 PROCEDURE — 25000003 PHARM REV CODE 250: Performed by: FAMILY MEDICINE

## 2017-03-21 PROCEDURE — 99283 EMERGENCY DEPT VISIT LOW MDM: CPT

## 2017-03-21 PROCEDURE — 63600175 PHARM REV CODE 636 W HCPCS: Performed by: FAMILY MEDICINE

## 2017-03-21 RX ORDER — KETOROLAC TROMETHAMINE 10 MG/1
10 TABLET, FILM COATED ORAL
Status: COMPLETED | OUTPATIENT
Start: 2017-03-21 | End: 2017-03-21

## 2017-03-21 RX ORDER — CEPHALEXIN 500 MG/1
500 CAPSULE ORAL
Status: COMPLETED | OUTPATIENT
Start: 2017-03-21 | End: 2017-03-21

## 2017-03-21 RX ORDER — CEPHALEXIN 500 MG/1
500 CAPSULE ORAL EVERY 6 HOURS
Qty: 40 CAPSULE | Refills: 0 | Status: SHIPPED | OUTPATIENT
Start: 2017-03-21 | End: 2017-03-31

## 2017-03-21 RX ORDER — PREDNISONE 20 MG/1
60 TABLET ORAL
Status: COMPLETED | OUTPATIENT
Start: 2017-03-21 | End: 2017-03-21

## 2017-03-21 RX ADMIN — KETOROLAC TROMETHAMINE 10 MG: 10 TABLET, FILM COATED ORAL at 02:03

## 2017-03-21 RX ADMIN — PREDNISONE 60 MG: 20 TABLET ORAL at 02:03

## 2017-03-21 RX ADMIN — CEPHALEXIN 500 MG: 500 CAPSULE ORAL at 02:03

## 2017-03-21 NOTE — DISCHARGE INSTRUCTIONS
Cellulitis  Cellulitis is an infection of the deep layers of skin. A break in the skin, such as a cut or scratch, can let bacteria under the skin. If the bacteria get to deep layers of the skin, it can be serious. If not treated, cellulitis can get into the bloodstream and lymph nodes. The infection can then spread throughout the body. This causes serious illness.  Cellulitis causes the affected skin to become red, swollen, warm, and sore. The reddened areas have a visible border. An open sore may leak fluid (pus). You may have a fever, chills, and pain.  Cellulitis is treated with antibiotics taken for 7 to 10 days. An open sore may be cleaned and covered with cool wet gauze. Symptoms should get better 1 to 2 days after treatment is started. Make sure to take all the antibiotics for the full number of days until they are gone. Keep taking the medicine even if your symptoms go away.  Home care  Follow these tips:  · Limit the use of the part of your body with cellulitis.   · If the infection is on your leg, keep your leg raised while sitting. This will help to reduce swelling.  · Take all of the antibiotic medicine exactly as directed until it is gone. Do not miss any doses, especially during the first 7 days. Dont stop taking the medicine when your symptoms get better.  · Keep the affected area clean and dry.  · Wash your hands with soap and warm water before and after touching your skin. Anyone else who touches your skin should also wash his or her hands. Don't share towels.  Follow-up care  Follow up with your healthcare provider, or as advised. If your infection does not go away on the first antibiotic, your healthcare provider will prescribe a different one.  When to seek medical advice  Call your healthcare provider right away if any of these occur:  · Red areas that spread  · Swelling or pain that gets worse  · Fluid leaking from the skin (pus)  · Fever higher of 100.4º F (38.0º C) or higher after 2 days  on antibiotics  Date Last Reviewed: 9/1/2016  © 5608-8489 [employer]. [address] [place] [address], [place] [address]. All rights reserved. This information is not intended as a substitute for professional medical care. Always follow your healthcare professional's instructions.

## 2017-03-21 NOTE — ED TRIAGE NOTES
R elbow redness, pain and swelling that started yesterday morning. States she woke up with it like this.

## 2017-03-21 NOTE — ED AVS SNAPSHOT
[facility]  [address]  [place] [address]               Ayana Anthony   3/21/2017 12:45 PM   ED    Description:  Female : 1970   Department:  [facility]           Your Care was Coordinated By:     Provider Role From To    Paulo Garner MD Attending Provider [phone] --      Reason for Visit     Joint Swelling           Diagnoses this Visit        Comments    Cellulitis of right elbow    -  Primary       ED Disposition     None           To Do List           Follow-up Information     Follow up with Elieser Rollins MD In 2 days.    Specialty:  Family Medicine    Contact information:    [address] [facility] [address]  [phone]         These Medications        Disp Refills Start End    cephALEXin (KEFLEX) 500 MG capsule 40 capsule 0 3/21/2017 3/31/2017    Take 1 capsule (500 mg total) by mouth every 6 (six) hours. - Oral    Pharmacy: [address] Ph #: [phone]         [facility] On Call     [facility] Care [facility] -  Assistance  Registered nurses in the [facility] provide clinical advisement, health education, appointment booking, and other advisory services.  Call for this free service at [phone].             Medications           Message regarding Medications     Verify the changes and/or additions to your medication regime listed below are the same as discussed with your clinician today.  If any of these changes or additions are incorrect, please notify your healthcare provider.        START taking these NEW medications        Refills    cephALEXin (KEFLEX) 500 MG capsule 0    Sig: Take 1 capsule (500 mg total) by mouth every 6 (six) hours.    Class: Print    Route: Oral      These medications were administered today        Dose Freq    cephALEXin capsule 500 mg 500 mg ED 1 Time    Sig: Take 1 capsule (500 mg total) by mouth ED 1 Time.    Class: Normal     Route: Oral    ketorolac tablet 10 mg 10 mg ED 1 Time    Sig: Take 1 tablet (10 mg total) by mouth ED 1 Time.    Class: Normal    Route: Oral    predniSONE tablet 60 mg 60 mg ED 1 Time    Sig: Take 3 tablets (60 mg total) by mouth ED 1 Time.    Class: Normal    Route: Oral      STOP taking these medications     ropinirole (REQUIP) 1 MG tablet Take 1 mg by mouth 2 (two) times daily.    promethazine (PHENERGAN) 12.5 MG Tab Take 1 tablet (12.5 mg total) by mouth every 6 (six) hours as needed (nausea).    hydrOXYzine (VISTARIL) 50 MG Cap Take 50 mg by mouth once daily.    gabapentin (NEURONTIN) 300 MG capsule Take 1 capsule (300 mg total) by mouth 2 (two) times daily.    clonazepam (KLONOPIN) 0.25 MG TbDL Take 1 tablet (0.25 mg total) by mouth 2 (two) times daily as needed (anxiety).           Verify that the below list of medications is an accurate representation of the medications you are currently taking.  If none reported, the list may be blank. If incorrect, please contact your healthcare provider. Carry this list with you in case of emergency.           Current Medications     albuterol (PROAIR HFA) 90 mcg/actuation inhaler Inhale 2 puffs into the lungs every 4 (four) hours as needed for Wheezing or Shortness of Breath.    amlodipine (NORVASC) 10 MG tablet Take 1 tablet (10 mg total) by mouth once daily.    cyanocobalamin 1,000 mcg/mL injection Inject 1 mL (1,000 mcg total) into the skin every 28 days.    diazePAM (VALIUM) 10 MG Tab Take 1 tablet (10 mg total) by mouth nightly as needed.    diclofenac sodium (VOLTAREN) 1 % Gel Apply 3 times daily prn    fluticasone (FLONASE) 50 mcg/actuation nasal spray TWO SPRAYS IN EACH NOSTRIL ONCE DAILY    folic acid (FOLVITE) 1 MG tablet TAKE ONE Tablet BY MOUTH EVERY DAY THANK YOU    hydrochlorothiazide (HYDRODIURIL) 25 MG tablet Take 1 tablet (25 mg total) by mouth once daily.    olanzapine (ZYPREXA) 2.5 MG tablet Take 1 tablet (2.5 mg total) by mouth every evening.     "oxcarbazepine (TRILEPTAL) 600 MG Tab Take 900 mg by mouth 2 (two) times daily. 1 1/2 am 1 1/2 pm     oxycodone (ROXICODONE) 30 MG Tab 5 a day    potassium chloride (MICRO-K) 10 MEQ CpSR Take 2 capsules (20 mEq total) by mouth 3 (three) times daily.    PREMARIN 1.25 mg tablet Take 1 tablet (1.25 mg total) by mouth once daily.    sertraline (ZOLOFT) 100 MG tablet Take 2 tablets (200 mg total) by mouth once daily.    trazodone (DESYREL) 150 MG tablet Take 1 tablet (150 mg total) by mouth nightly as needed for Insomnia.    cephALEXin (KEFLEX) 500 MG capsule Take 1 capsule (500 mg total) by mouth every 6 (six) hours.    cephALEXin capsule 500 mg Take 1 capsule (500 mg total) by mouth ED 1 Time.    guaifenesin-codeine 100-10 mg/5 ml (TUSSI-ORGANIDIN NR)  mg/5 mL syrup Take 5 mLs by mouth every 8 (eight) hours as needed for Cough.    hyoscyamine (ANASPAZ,LEVSIN) 0.125 mg Tab Take 1 tablet (125 mcg total) by mouth every 4 (four) hours as needed. For bladder spasm    ketorolac tablet 10 mg Take 1 tablet (10 mg total) by mouth ED 1 Time.    ondansetron (ZOFRAN-ODT) 4 MG TbDL Take 1 tablet (4 mg total) by mouth every 8 (eight) hours as needed.    predniSONE tablet 60 mg Take 3 tablets (60 mg total) by mouth ED 1 Time.    silver sulfADIAZINE 1% (SILVADENE) 1 % cream Apply topically 2 (two) times daily.    tiotropium-olodaterol 2.5-2.5 mcg/actuation Mist Inhale 1 puff into the lungs once daily. Controller           Clinical Reference Information           Your Vitals Were     BP Pulse Temp Resp Height Weight    127/90 (BP Location: Left arm, Patient Position: Sitting, BP Method: Automatic) 106 97.8 °F (36.6 °C) (Oral) 18 5' 2" (1.575 m) 54.4 kg (120 lb)    SpO2 BMI             98% 21.95 kg/m2         Allergies as of 3/21/2017        Reactions    Bactrim [Sulfamethoxazole-trimethoprim] Itching    Tramadol     Vancomycin Analogues Rash      Immunizations Administered on Date of Encounter - 3/21/2017     None      ED Micro, " Lab, POCT     None      ED Imaging Orders     None        Discharge Instructions         Cellulitis  Cellulitis is an infection of the deep layers of skin. A break in the skin, such as a cut or scratch, can let bacteria under the skin. If the bacteria get to deep layers of the skin, it can be serious. If not treated, cellulitis can get into the bloodstream and lymph nodes. The infection can then spread throughout the body. This causes serious illness.  Cellulitis causes the affected skin to become red, swollen, warm, and sore. The reddened areas have a visible border. An open sore may leak fluid (pus). You may have a fever, chills, and pain.  Cellulitis is treated with antibiotics taken for 7 to 10 days. An open sore may be cleaned and covered with cool wet gauze. Symptoms should get better 1 to 2 days after treatment is started. Make sure to take all the antibiotics for the full number of days until they are gone. Keep taking the medicine even if your symptoms go away.  Home care  Follow these tips:  · Limit the use of the part of your body with cellulitis.   · If the infection is on your leg, keep your leg raised while sitting. This will help to reduce swelling.  · Take all of the antibiotic medicine exactly as directed until it is gone. Do not miss any doses, especially during the first 7 days. Dont stop taking the medicine when your symptoms get better.  · Keep the affected area clean and dry.  · Wash your hands with soap and warm water before and after touching your skin. Anyone else who touches your skin should also wash his or her hands. Don't share towels.  Follow-up care  Follow up with your healthcare provider, or as advised. If your infection does not go away on the first antibiotic, your healthcare provider will prescribe a different one.  When to seek medical advice  Call your healthcare provider right away if any of these occur:  · Red areas that spread  · Swelling or pain that gets worse  · Fluid  leaking from the skin (pus)  · Fever higher of 100.4º F (38.0º C) or higher after 2 days on antibiotics  Date Last Reviewed: 9/1/2016  © 0301-1165 [employer]. [address] [place] [address], [place] [address]. All rights reserved. This information is not intended as a substitute for professional medical care. Always follow your healthcare professional's instructions.          Your Scheduled Appointments     Mar 28, 2017  4:00 PM CDT   Established [facility]    [address] [place] [id number] [phone]               [facility] complies with applicable Federal civil rights laws and does not discriminate on the basis of race, color, national origin, age, disability, or sex.        Language Assistance Services     ATTENTION: Language assistance services are available, free of charge. Please call [phone].      ATENCIÓN: Si habla español, tiene a moreno disposición servicios gratuitos de asistencia lingüística. Llame al [phone].     CHÚ Ý: N?u b?n nói Ti?ng Vi?t, có các d?ch v? h? tr? ngôn ng? mi?n phí neryh cho b?n. G?i s? [phone].

## 2017-03-21 NOTE — ED PROVIDER NOTES
Encounter Date: 3/21/2017       History     Chief Complaint   Patient presents with    Joint Swelling     R elbow redness, pain and swelling that started yesterday morning. States she woke up with it like this.     Review of patient's allergies indicates:   Allergen Reactions    Bactrim [sulfamethoxazole-trimethoprim] Itching    Tramadol     Vancomycin analogues Rash     HPI Comments: Patient complains of redness and swelling and right elbow since yesterday.  Patient denies any injury.  Or any insect bite.  Patient also complains of pain in that area.  He shouldn't has not taken anything so far for her pain or swelling in the elbow.    The history is provided by the patient.     Past Medical History:   Diagnosis Date    B12 deficiency anemia     Chronic pain     Depression     Epilepsy     Hematuria     Hypertension     Insomnia     Insomnia     Migraines      Past Surgical History:   Procedure Laterality Date    APPENDECTOMY      BELT ABDOMINOPLASTY      breast implants  2013     SECTION, LOW TRANSVERSE      x3    CHOLECYSTECTOMY      GASTRIC BYPASS      HYSTERECTOMY      REDUCTION MAMMAPLASTY      reduction and implants    TONSILLECTOMY, ADENOIDECTOMY       Family History   Problem Relation Age of Onset    Breast cancer Mother     Diabetes Mother     Hypertension Mother     Heart disease Mother     Cancer Mother      breast    No Known Problems Sister     No Known Problems Brother     No Known Problems Daughter     No Known Problems Son     Colon cancer Neg Hx     Ovarian cancer Neg Hx      Social History   Substance Use Topics    Smoking status: Never Smoker    Smokeless tobacco: None    Alcohol use No     Review of Systems   Constitutional: Negative for activity change, appetite change, chills and fever.   HENT: Negative for sore throat.    Eyes: Negative for pain, discharge and itching.   Respiratory: Negative for cough, shortness of breath and wheezing.     Gastrointestinal: Negative for abdominal distention, abdominal pain, nausea and vomiting.   Genitourinary: Negative for dysuria and flank pain.   Musculoskeletal: Negative for back pain and neck pain.   Skin: Negative for rash.   Neurological: Negative for dizziness, light-headedness, numbness and headaches.   Psychiatric/Behavioral: The patient is not nervous/anxious.    All other systems reviewed and are negative.      Physical Exam   Initial Vitals   BP Pulse Resp Temp SpO2   03/21/17 1250 03/21/17 1250 03/21/17 1250 03/21/17 1250 03/21/17 1250   127/90 106 18 97.8 °F (36.6 °C) 98 %     Physical Exam    Nursing note and vitals reviewed.  Constitutional: She appears well-developed and well-nourished.   HENT:   Head: Normocephalic and atraumatic.   Nose: Nose normal.   Mouth/Throat: Oropharynx is clear and moist.   Eyes: EOM are normal. Pupils are equal, round, and reactive to light.   Neck: Normal range of motion. Neck supple.   Cardiovascular: Normal rate, regular rhythm, normal heart sounds and intact distal pulses. Exam reveals no gallop and no friction rub.    No murmur heard.  Pulmonary/Chest: Breath sounds normal. No respiratory distress. She has no wheezes. She has no rhonchi. She has no rales. She exhibits no tenderness.   Abdominal: Soft. Bowel sounds are normal.   Musculoskeletal: Normal range of motion.   Neurological: She is alert and oriented to person, place, and time. She has normal strength and normal reflexes. She displays normal reflexes. No cranial nerve deficit or sensory deficit.   Skin: Skin is warm. There is erythema.        5x 4 cm area of erythema- and tenderness. i right elbow.         ED Course   Procedures  Labs Reviewed - No data to display          Medical Decision Making:   Differential Diagnosis:   Cellulitis.  Insect bite.  Injury.  ED Management:  Patient is given antibiotics and advised to follow up with the primary care physician.  Continue antibiotics and follow-up with PCP  in 2 days and reevaluated.  If the redness of the swelling increases or if fever worsens then advised to follow-up                   ED Course     Clinical Impression:   The encounter diagnosis was Cellulitis of right elbow.    Disposition:   Disposition: Discharged  Condition: Stable  Patient is advised to continue antibiotics and follow up with primary care physician.       Paulo Garner MD  03/21/17 6413

## 2017-03-27 ENCOUNTER — TELEPHONE (OUTPATIENT)
Dept: FAMILY MEDICINE | Facility: CLINIC | Age: 47
End: 2017-03-27

## 2017-03-27 NOTE — TELEPHONE ENCOUNTER
----- Message from Estefany Pollock sent at 3/27/2017  3:42 PM CDT -----  Contact:  Farzana franco [facility] in [place]/ [phone]   FYI: Patient has been a patient in there pain management clinic since 2010. Clinic was advised by patient's pharmacy today that patient is also getting pain medication refills from Dr. Rollins.

## 2017-03-28 ENCOUNTER — OFFICE VISIT (OUTPATIENT)
Dept: FAMILY MEDICINE | Facility: CLINIC | Age: 47
End: 2017-03-28
Payer: MEDICAID

## 2017-03-28 VITALS
HEART RATE: 108 BPM | OXYGEN SATURATION: 98 % | WEIGHT: 118.38 LBS | SYSTOLIC BLOOD PRESSURE: 132 MMHG | RESPIRATION RATE: 18 BRPM | BODY MASS INDEX: 21.79 KG/M2 | DIASTOLIC BLOOD PRESSURE: 78 MMHG | TEMPERATURE: 99 F | HEIGHT: 62 IN

## 2017-03-28 DIAGNOSIS — G89.29 CHRONIC NECK PAIN: ICD-10-CM

## 2017-03-28 DIAGNOSIS — M54.2 CHRONIC NECK PAIN: ICD-10-CM

## 2017-03-28 DIAGNOSIS — G89.4 CHRONIC PAIN SYNDROME: Primary | ICD-10-CM

## 2017-03-28 DIAGNOSIS — G89.29 OTHER CHRONIC PAIN: ICD-10-CM

## 2017-03-28 PROCEDURE — 99213 OFFICE O/P EST LOW 20 MIN: CPT | Mod: S$GLB,,, | Performed by: FAMILY MEDICINE

## 2017-03-28 RX ORDER — OXYCODONE HYDROCHLORIDE 30 MG/1
TABLET ORAL
Qty: 100 TABLET | Refills: 0 | Status: SHIPPED | OUTPATIENT
Start: 2017-03-28 | End: 2017-04-21 | Stop reason: ALTCHOICE

## 2017-03-28 NOTE — MR AVS SNAPSHOT
[facility]  [address] [place] [address]  Cristiane GIBBS [address]  Phone: [phone]  Fax: [phone]                  Ayana Anthony   3/28/2017 4:00 PM   Office Visit    Description:  Female : 1970   Provider:  Elieser Rollins MD   Department:  [facility] Medicine           Reason for Visit     Hospital Follow Up           Diagnoses this Visit        Comments    Chronic pain syndrome    -  Primary     Other chronic pain         Chronic neck pain                To Do List           Goals (5 Years of Data)     None      Follow-Up and Disposition     Return if symptoms worsen or fail to improve.       These Medications        Disp Refills Start End    oxycodone (ROXICODONE) 30 MG Tab 100 tablet 0 3/28/2017     5 a day    Pharmacy: [address] #: [phone]         [facility] On Call     [facility] On Call Nurse Care Line -  Assistance  Unless otherwise directed by your provider, please contact [facility] On-Call, our nurse care line that is available for  assistance.     Registered nurses in the [facility] provide: appointment scheduling, clinical advisement, health education, and other advisory services.  Call: [phone] (toll free)               Medications           Message regarding Medications     Verify the changes and/or additions to your medication regime listed below are the same as discussed with your clinician today.  If any of these changes or additions are incorrect, please notify your healthcare provider.        STOP taking these medications     hyoscyamine (ANASPAZ,LEVSIN) 0.125 mg Tab Take 1 tablet (125 mcg total) by mouth every 4 (four) hours as needed. For bladder spasm    silver sulfADIAZINE 1% (SILVADENE) 1 % cream Apply topically 2 (two) times daily.    tiotropium-olodaterol 2.5-2.5 mcg/actuation Mist Inhale 1 puff into the lungs once daily. Controller           Verify that the below list of medications is  an accurate representation of the medications you are currently taking.  If none reported, the list may be blank. If incorrect, please contact your healthcare provider. Carry this list with you in case of emergency.           Current Medications     albuterol (PROAIR HFA) 90 mcg/actuation inhaler Inhale 2 puffs into the lungs every 4 (four) hours as needed for Wheezing or Shortness of Breath.    amlodipine (NORVASC) 10 MG tablet Take 1 tablet (10 mg total) by mouth once daily.    cyanocobalamin 1,000 mcg/mL injection Inject 1 mL (1,000 mcg total) into the skin every 28 days.    diazePAM (VALIUM) 10 MG Tab Take 1 tablet (10 mg total) by mouth nightly as needed.    diclofenac sodium (VOLTAREN) 1 % Gel Apply 3 times daily prn    fluticasone (FLONASE) 50 mcg/actuation nasal spray TWO SPRAYS IN EACH NOSTRIL ONCE DAILY    folic acid (FOLVITE) 1 MG tablet TAKE ONE Tablet BY MOUTH EVERY DAY THANK YOU    guaifenesin-codeine 100-10 mg/5 ml (TUSSI-ORGANIDIN NR)  mg/5 mL syrup Take 5 mLs by mouth every 8 (eight) hours as needed for Cough.    hydrochlorothiazide (HYDRODIURIL) 25 MG tablet Take 1 tablet (25 mg total) by mouth once daily.    olanzapine (ZYPREXA) 2.5 MG tablet Take 1 tablet (2.5 mg total) by mouth every evening.    ondansetron (ZOFRAN-ODT) 4 MG TbDL Take 1 tablet (4 mg total) by mouth every 8 (eight) hours as needed.    oxcarbazepine (TRILEPTAL) 600 MG Tab Take 900 mg by mouth 2 (two) times daily. 1 1/2 am 1 1/2 pm     oxycodone (ROXICODONE) 30 MG Tab 5 a day    potassium chloride (MICRO-K) 10 MEQ CpSR Take 2 capsules (20 mEq total) by mouth 3 (three) times daily.    PREMARIN 1.25 mg tablet Take 1 tablet (1.25 mg total) by mouth once daily.    sertraline (ZOLOFT) 100 MG tablet Take 2 tablets (200 mg total) by mouth once daily.    trazodone (DESYREL) 150 MG tablet Take 1 tablet (150 mg total) by mouth nightly as needed for Insomnia.           Clinical Reference Information           Your Vitals Were     BP  "Pulse Temp Resp Height Weight    132/78 (BP Location: Left arm, Patient Position: Sitting, BP Method: Manual) 108 98.5 °F (36.9 °C) (Oral) 18 5' 2" (1.575 m) 53.7 kg (118 lb 6.2 oz)    SpO2 BMI             98% 21.65 kg/m2         Blood Pressure          Most Recent Value    BP  132/78      Allergies as of 3/28/2017     Bactrim [Sulfamethoxazole-trimethoprim]    Tramadol    Vancomycin Analogues      Immunizations Administered on Date of Encounter - 3/28/2017     None      Language Assistance Services     ATTENTION: Language assistance services are available, free of charge. Please call [phone].      ATENCIÓN: Si dawna nidiayue, tiene a moreno disposición servicios gratuitos de asistencia lingüística. Llame al [phone].     ASHLI Ý: N?u b?n nói Ti?ng Vi?t, có các d?ch v? h? tr? ngôn ng? mi?n phí dành cho b?n. G?i s? [phone].         [facility] complies with applicable Federal civil rights laws and does not discriminate on the basis of race, color, national origin, age, disability, or sex.        "

## 2017-03-28 NOTE — PROGRESS NOTES
Subjective:      Patient ID: Ayana Anthony is a 47 y.o. female.    Chief Complaint: Hospital Follow Up (had spider bite, experienced a fall today with injury to back and neck)    HPI Comments: Right elbow with cellulitis; out of pain meds; we received a call from old pain clinic; i called ; they are closed; pt notified them she is not going there due to cost; pt is supposed to get another pain management through [employer]; she called them [employer]; they gave names and and one is retired and one doesn't want to see because he is a surgeon; last Rx for pain meds was written by me; has been taking 5 day of the 30 mg roxicodones for years; pt was told by me that she needs to be off of these eventually; this is the last Rx for pain I'm writing; I'll be glad to treat other conditions not narcotic related; I queried the [place] .  Pain is neck, back, sciatic nerve, tail bone, hands; here with     Review of Systems   Constitutional: Negative.    HENT: Negative.    Respiratory: Negative.    Cardiovascular: Negative.    Gastrointestinal: Negative.    Endocrine: Negative.    Genitourinary: Negative.    Musculoskeletal: Negative.    Psychiatric/Behavioral: Negative.    All other systems reviewed and are negative.    Objective:     Physical Exam   Constitutional: She is oriented to person, place, and time. She appears well-developed and well-nourished.   HENT:   Head: Normocephalic.   Eyes: Conjunctivae and EOM are normal. Pupils are equal, round, and reactive to light.   Neck: Normal range of motion. Neck supple.   Cardiovascular: Normal rate, regular rhythm and normal heart sounds.    Pulmonary/Chest: Effort normal and breath sounds normal.   Musculoskeletal: Normal range of motion.   Neurological: She is alert and oriented to person, place, and time. She has normal reflexes.   Skin: Skin is warm and dry.   Psychiatric: She has a normal mood and affect. Her behavior is normal. Judgment and thought content normal.   Nursing note  and vitals reviewed.    Assessment:     1. Chronic pain syndrome    2. Other chronic pain    3. Chronic neck pain      Plan:     New Prescriptions    No medications on file     Discontinued Medications    HYOSCYAMINE (ANASPAZ,LEVSIN) 0.125 MG TAB    Take 1 tablet (125 mcg total) by mouth every 4 (four) hours as needed. For bladder spasm    SILVER SULFADIAZINE 1% (SILVADENE) 1 % CREAM    Apply topically 2 (two) times daily.    TIOTROPIUM-OLODATEROL 2.5-2.5 MCG/ACTUATION MIST    Inhale 1 puff into the lungs once daily. Controller     Modified Medications    Modified Medication Previous Medication    OXYCODONE (ROXICODONE) 30 MG TAB oxycodone (ROXICODONE) 30 MG Tab       5 a day    5 a day       Chronic pain syndrome    Other chronic pain    Chronic neck pain    Other orders  -     oxycodone (ROXICODONE) 30 MG Tab; 5 a day  Dispense: 100 tablet; Refill: 0      This is the last Rx from me for her for narcotics; see a doctor that is on her plan; She understands no more narcotics from me.

## 2017-03-28 NOTE — TELEPHONE ENCOUNTER
I spoke to Nikki (pharmacist) at [facility] - [phone]    She states that the pt purchased a 52 day supply of Oxycodone 30mg on 2/17/2017 from [facility] that was written by Dr Hartmann - nathalia craven.    On 3/1/2017 - 12 days later she picked up Oxycodone 30mg #150 written by Dr Rollins from [place] [place] [facility].

## 2017-04-03 ENCOUNTER — TELEPHONE (OUTPATIENT)
Dept: FAMILY MEDICINE | Facility: CLINIC | Age: 47
End: 2017-04-03

## 2017-04-03 DIAGNOSIS — G89.4 CHRONIC PAIN SYNDROME: Primary | ICD-10-CM

## 2017-04-03 NOTE — TELEPHONE ENCOUNTER
----- Message from Estefany Pollock sent at 4/3/2017 11:52 AM CDT -----  Contact: Pt [phone]  Patient would like a referral sent to Dr. Danilo Monaco in [place] for pain management. Please fax referral to [phone]. Referral has to be sent with Dr. Rollins's last three office notes.     FYI: [facility] in [place] called stating that patient's  came into office stating that Dr. Rollins mistakenly put his name on patients prescription and a new one was needed for patient. Doctor's office stated they contacted the pharmacy and was told both patient and her  received prescriptions on the dates 03/28/2017 & 03/13/2017 prescribed for Oxycodone and can't receive any more for the month. Patient has now been discharged for pain clinic.

## 2017-04-06 ENCOUNTER — TELEPHONE (OUTPATIENT)
Dept: FAMILY MEDICINE | Facility: CLINIC | Age: 47
End: 2017-04-06

## 2017-04-06 NOTE — TELEPHONE ENCOUNTER
----- Message from Estefany Pollock sent at 4/6/2017  1:37 PM CDT -----  Contact: Pt / [phone]  Patient would like a call from Dr. Rollins. Patient states the pain management doctor doesn't want to except her because of her old pain management doctor.

## 2017-04-06 NOTE — TELEPHONE ENCOUNTER
"I spoke to pt - She just wanted to let you know that she is not "that type of person" that the "other" pain mgmt clinic is saying.  I advised her that Dr Rollins is no longer writing pain meds for her.  I would note the chart.     Dr Rollins notified.      "

## 2017-04-07 NOTE — TELEPHONE ENCOUNTER
"Can't order "head to toe" MRI; needs to come to discuss her complaints; no pain meds will be written.  "

## 2017-04-07 NOTE — TELEPHONE ENCOUNTER
Patient would like  to order a MRI (from head to toe). Patients states that she would also like orders for blood work for autoimmune disorder.   Please call when orders are done [phone]

## 2017-04-10 NOTE — TELEPHONE ENCOUNTER
i notified the pt and scheduled her to see dr harrison may 3 and mailed an apt reminder letter to her

## 2017-04-21 ENCOUNTER — HOSPITAL ENCOUNTER (EMERGENCY)
Facility: HOSPITAL | Age: 47
Discharge: HOME | End: 2017-04-21
Attending: EMERGENCY MEDICINE
Payer: MEDICAID

## 2017-04-21 VITALS
WEIGHT: 110 LBS | BODY MASS INDEX: 20.24 KG/M2 | RESPIRATION RATE: 20 BRPM | HEART RATE: 86 BPM | TEMPERATURE: 97 F | SYSTOLIC BLOOD PRESSURE: 138 MMHG | DIASTOLIC BLOOD PRESSURE: 90 MMHG | HEIGHT: 62 IN | OXYGEN SATURATION: 98 %

## 2017-04-21 DIAGNOSIS — M54.31 SCIATICA OF RIGHT SIDE: Primary | ICD-10-CM

## 2017-04-21 PROCEDURE — 99283 EMERGENCY DEPT VISIT LOW MDM: CPT | Mod: 25

## 2017-04-21 PROCEDURE — 96372 THER/PROPH/DIAG INJ SC/IM: CPT

## 2017-04-21 PROCEDURE — 63600175 PHARM REV CODE 636 W HCPCS: Performed by: EMERGENCY MEDICINE

## 2017-04-21 RX ORDER — METHOCARBAMOL 750 MG/1
1500 TABLET, FILM COATED ORAL 3 TIMES DAILY
Qty: 30 TABLET | Refills: 0 | Status: SHIPPED | OUTPATIENT
Start: 2017-04-21 | End: 2017-05-01

## 2017-04-21 RX ORDER — MORPHINE SULFATE 2 MG/ML
6 INJECTION, SOLUTION INTRAMUSCULAR; INTRAVENOUS
Status: COMPLETED | OUTPATIENT
Start: 2017-04-21 | End: 2017-04-21

## 2017-04-21 RX ORDER — HYDROCODONE BITARTRATE AND ACETAMINOPHEN 10; 325 MG/1; MG/1
1 TABLET ORAL EVERY 4 HOURS PRN
Qty: 20 TABLET | Refills: 0 | Status: SHIPPED | OUTPATIENT
Start: 2017-04-21 | End: 2017-05-03

## 2017-04-21 RX ORDER — PREDNISONE 20 MG/1
40 TABLET ORAL DAILY
Qty: 10 TABLET | Refills: 0 | Status: SHIPPED | OUTPATIENT
Start: 2017-04-21 | End: 2017-04-26

## 2017-04-21 RX ADMIN — MORPHINE SULFATE 6 MG: 2 INJECTION, SOLUTION INTRAMUSCULAR; INTRAVENOUS at 09:04

## 2017-04-21 NOTE — ED AVS SNAPSHOT
[facility]  [address] [place] [address]               Ayana Anthony   2017  7:45 PM   ED    Description:  Female : 1970   Department:  [facility]           Your Care was Coordinated By:     Provider Role From To    Paige Vo MD Attending Provider 17 --      Reason for Visit     Back Pain           Diagnoses this Visit        Comments    Sciatica of right side    -  Primary       ED Disposition     ED Disposition Condition Comment    Discharge             To Do List           Follow-up Information     Follow up with Elieser Rollins MD In 1 week(s).    Specialty:  Family Medicine    Contact information:    [address] [facility] [address]  [phone]         These Medications        Disp Refills Start End    methocarbamol (ROBAXIN) 750 MG Tab 30 tablet 0 2017    Take 2 tablets (1,500 mg total) by mouth 3 (three) times daily. - Oral    Pharmacy: [facility] [address] Ph #: [phone]       hydrocodone-acetaminophen 10-325mg (NORCO)  mg Tab 20 tablet 0 2017     Take 1 tablet by mouth every 4 (four) hours as needed for Pain (pain). - Oral    Pharmacy: [facility] [place], [place] - [address] Ph #: [phone]       predniSONE (DELTASONE) 20 MG tablet 10 tablet 0 2017    Take 2 tablets (40 mg total) by mouth once daily. - Oral    Pharmacy: [facility] [place], [place] - [address] Ph #: [phone]         [facility]     [facility] -  Assistance  Unless otherwise directed by your provider, please contact [facility], our nurse care line that is available for  assistance.     Registered nurses in the [facility] provide: appointment scheduling, clinical advisement, health education, and other advisory services.  Call: [phone] (toll free)               Medications            Message regarding Medications     Verify the changes and/or additions to your medication regime listed below are the same as discussed with your clinician today.  If any of these changes or additions are incorrect, please notify your healthcare provider.        START taking these NEW medications        Refills    methocarbamol (ROBAXIN) 750 MG Tab 0    Sig: Take 2 tablets (1,500 mg total) by mouth 3 (three) times daily.    Class: Print    Route: Oral    hydrocodone-acetaminophen 10-325mg (NORCO)  mg Tab 0    Sig: Take 1 tablet by mouth every 4 (four) hours as needed for Pain (pain).    Class: Print    Route: Oral    predniSONE (DELTASONE) 20 MG tablet 0    Sig: Take 2 tablets (40 mg total) by mouth once daily.    Class: Normal    Route: Oral      These medications were administered today        Dose Freq    morphine injection 6 mg 6 mg ED 1 Time    Sig: Inject 3 mLs (6 mg total) into the muscle ED 1 Time.    Class: Normal    Route: Intramuscular      STOP taking these medications     oxycodone (ROXICODONE) 30 MG Tab 5 a day    guaifenesin-codeine 100-10 mg/5 ml (TUSSI-ORGANIDIN NR)  mg/5 mL syrup Take 5 mLs by mouth every 8 (eight) hours as needed for Cough.           Verify that the below list of medications is an accurate representation of the medications you are currently taking.  If none reported, the list may be blank. If incorrect, please contact your healthcare provider. Carry this list with you in case of emergency.           Current Medications     amlodipine (NORVASC) 10 MG tablet Take 1 tablet (10 mg total) by mouth once daily.    cyanocobalamin 1,000 mcg/mL injection Inject 1 mL (1,000 mcg total) into the skin every 28 days.    diazePAM (VALIUM) 10 MG Tab Take 1 tablet (10 mg total) by mouth nightly as needed.    diclofenac sodium (VOLTAREN) 1 % Gel Apply 3 times daily prn    fluticasone (FLONASE) 50 mcg/actuation nasal spray TWO SPRAYS IN EACH NOSTRIL ONCE DAILY    folic acid (FOLVITE) 1  "MG tablet TAKE ONE Tablet BY MOUTH EVERY DAY THANK YOU    hydrochlorothiazide (HYDRODIURIL) 25 MG tablet Take 1 tablet (25 mg total) by mouth once daily.    ondansetron (ZOFRAN-ODT) 4 MG TbDL Take 1 tablet (4 mg total) by mouth every 8 (eight) hours as needed.    potassium chloride (MICRO-K) 10 MEQ CpSR Take 2 capsules (20 mEq total) by mouth 3 (three) times daily.    PREMARIN 1.25 mg tablet Take 1 tablet (1.25 mg total) by mouth once daily.    sertraline (ZOLOFT) 100 MG tablet Take 2 tablets (200 mg total) by mouth once daily.    trazodone (DESYREL) 150 MG tablet Take 1 tablet (150 mg total) by mouth nightly as needed for Insomnia.    albuterol (PROAIR HFA) 90 mcg/actuation inhaler Inhale 2 puffs into the lungs every 4 (four) hours as needed for Wheezing or Shortness of Breath.    hydrocodone-acetaminophen 10-325mg (NORCO)  mg Tab Take 1 tablet by mouth every 4 (four) hours as needed for Pain (pain).    methocarbamol (ROBAXIN) 750 MG Tab Take 2 tablets (1,500 mg total) by mouth 3 (three) times daily.    olanzapine (ZYPREXA) 2.5 MG tablet Take 1 tablet (2.5 mg total) by mouth every evening.    oxcarbazepine (TRILEPTAL) 600 MG Tab Take 900 mg by mouth 2 (two) times daily. 1 1/2 am 1 1/2 pm     predniSONE (DELTASONE) 20 MG tablet Take 2 tablets (40 mg total) by mouth once daily.           Clinical Reference Information           Your Vitals Were     BP Pulse Temp Resp Height Weight    127/87 (BP Location: Right arm, Patient Position: Sitting) 97 97.8 °F (36.6 °C) (Oral) 18 5' 2" (1.575 m) 49.9 kg (110 lb)    SpO2 BMI             96% 20.12 kg/m2         Allergies as of 4/21/2017        Reactions    Bactrim [Sulfamethoxazole-trimethoprim] Itching    Tramadol     Vancomycin Analogues Rash      Immunizations Administered on Date of Encounter - 4/21/2017     None      ED Micro, Lab, POCT     None      ED Imaging Orders     None        Discharge Instructions           Sciatica    Sciatica is a condition that causes " pain in the lower back that spreads down into the buttock, hip, and leg. Sometimes the leg pain can happen without any back pain. Sciatica happens when a spinal nerve is irritated or has pressure put on it as comes out of the spinal canal in the lower back. This most often happens when a bulge or rupture of a nearby spinal disk presses on the nerve. Sciatica can also be caused by a narrowing of the spinal canal (spinal stenosis) or spasm of the muscle in the buttocks that the sciatic nerve passes through (pyriform muscle). Sciatica is also called lumbar radiculopathy.  Sciatica may begin after a sudden twisting or bending force, such as in a car accident. Or it can happen after a simple awkward movement. In either case, muscle spasm often also happens. Muscle spasm makes the pain worse.  A healthcare provider makes a diagnosis of sciatica from your symptoms and a physical exam. Unless you had an injury from a car accident or fall, you usually wont have X-rays taken at this time. This is because the nerves and disks in your back cant be seen on an X-ray. If the provider sees signs of a compressed nerve, you will need to schedule an MRI scan as an outpatient. Signs of a compressed nerve include loss of strength in a leg.  Most sciatica gets better with medicine, exercise, and physical therapy. If your symptoms continue after at least 3 months of medical treatment, you may need surgery or injections to your lower back.  Home care  Follow these tips when caring for yourself at home:  · You may need to stay in bed the first few days. But as soon as possible, begin sitting up or walking. This will help you avoid problems that come from staying in bed for long periods.  · When in bed, try to find a position that is comfortable. A firm mattress is best. Try lying flat on your back with pillows under your knees. You can also try lying on your side with your knees bent up toward your chest and a pillow between your  knees.  · Avoid sitting for long periods. This puts more stress on your lower back than standing or walking.  · Use heat from a hot shower, hot bath, or heating pad to help ease pain. Massage can also help. You can also try using an ice pack. You can make your own ice pack by putting ice cubes in a plastic bag. Wrap the bag in a thin towel. Try both heat and cold to see which works best. Use the method that feels best for 20 minutes several times a day.  · You may use acetaminophen or ibuprofen to ease pain, unless another pain medicine was prescribed. Note: If you have chronic liver or kidney disease, talk with your healthcare provider before taking these medicines. Also talk with your provider if youve had a stomach ulcer or gastrointestinal bleeding.  · Use safe lifting methods. Dont lift anything heavier than 15 pounds until all of the pain is gone.  Follow-up care  Follow up with your healthcare provider, or as advised. You may need physical therapy or additional tests.  If X-rays were taken, a radiologist will look at them. You will be told of any new findings that may affect your care.  When to seek medical advice  Call your healthcare provider right away if any of these occur:  · Pain gets worse even after taking prescribed medicine  · Weakness or numbness in 1 or both legs or hips  · Numbness in your groin or genital area  · You cant control your bowel or bladder  · Fever  · Redness or swelling over your back or spine   Date Last Reviewed: 8/1/2016  © 3503-8164 [employer]. [address] [place] [address]. All rights reserved. This information is not intended as a substitute for professional medical care. Always follow your healthcare professional's instructions.          Sciatica    Sciatica is a condition that causes pain in the lower back that spreads down into the buttock, hip, and leg. Sometimes the leg pain can happen without any back pain. Sciatica happens when a spinal  nerve is irritated or has pressure put on it as comes out of the spinal canal in the lower back. This most often happens when a bulge or rupture of a nearby spinal disk presses on the nerve. Sciatica can also be caused by a narrowing of the spinal canal (spinal stenosis) or spasm of the muscle in the buttocks that the sciatic nerve passes through (pyriform muscle). Sciatica is also called lumbar radiculopathy.  Sciatica may begin after a sudden twisting or bending force, such as in a car accident. Or it can happen after a simple awkward movement. In either case, muscle spasm often also happens. Muscle spasm makes the pain worse.  A healthcare provider makes a diagnosis of sciatica from your symptoms and a physical exam. Unless you had an injury from a car accident or fall, you usually wont have X-rays taken at this time. This is because the nerves and disks in your back cant be seen on an X-ray. If the provider sees signs of a compressed nerve, you will need to schedule an MRI scan as an outpatient. Signs of a compressed nerve include loss of strength in a leg.  Most sciatica gets better with medicine, exercise, and physical therapy. If your symptoms continue after at least 3 months of medical treatment, you may need surgery or injections to your lower back.  Home care  Follow these tips when caring for yourself at home:  · You may need to stay in bed the first few days. But as soon as possible, begin sitting up or walking. This will help you avoid problems that come from staying in bed for long periods.  · When in bed, try to find a position that is comfortable. A firm mattress is best. Try lying flat on your back with pillows under your knees. You can also try lying on your side with your knees bent up toward your chest and a pillow between your knees.  · Avoid sitting for long periods. This puts more stress on your lower back than standing or walking.  · Use heat from a hot shower, hot bath, or heating pad to  help ease pain. Massage can also help. You can also try using an ice pack. You can make your own ice pack by putting ice cubes in a plastic bag. Wrap the bag in a thin towel. Try both heat and cold to see which works best. Use the method that feels best for 20 minutes several times a day.  · You may use acetaminophen or ibuprofen to ease pain, unless another pain medicine was prescribed. Note: If you have chronic liver or kidney disease, talk with your healthcare provider before taking these medicines. Also talk with your provider if youve had a stomach ulcer or gastrointestinal bleeding.  · Use safe lifting methods. Dont lift anything heavier than 15 pounds until all of the pain is gone.  Follow-up care  Follow up with your healthcare provider, or as advised. You may need physical therapy or additional tests.  If X-rays were taken, a radiologist will look at them. You will be told of any new findings that may affect your care.  When to seek medical advice  Call your healthcare provider right away if any of these occur:  · Pain gets worse even after taking prescribed medicine  · Weakness or numbness in 1 or both legs or hips  · Numbness in your groin or genital area  · You cant control your bowel or bladder  · Fever  · Redness or swelling over your back or spine   Date Last Reviewed: 8/1/2016  © 7790-0037 [employer]. [address] [place] [address], [place] [address]. All rights reserved. This information is not intended as a substitute for professional medical care. Always follow your healthcare professional's instructions.          Understanding Lumbar Radiculopathy    Lumbar radiculopathy is irritation or inflammation of a nerve root in the low back. It causes symptoms that spread out from the back down one or both legs. To understand this condition, it helps to understand the parts of the spine:  · Vertebrae. These are bones that stack to form the spine. The lumbar spine contains the 5 bottom  vertebrae.  · Disks. These are soft pads of tissue between the vertebrae. They act as shock absorbers for the spine.  · Spinal canal. This is a tunnel formed within the stacked vertebrae. In the lumbar spine, nerves run through this canal.  · Nerves. These branch off and leave the spinal canal, traveling out to parts of the body. As they leave the spinal canal, nerves pass through openings between the vertebrae. The nerve root is the part of the nerve that is closest to the spinal canal.  · Sciatic nerve. This is a large nerve formed from several nerve roots in the low back. This nerve extends down the back of the leg to the foot.  With lumbar radiculopathy, nerve roots in the low back become irritated. This leads to pain and symptoms. The sciatic nerve is commonly involved, so the condition is often called sciatica.  What causes lumbar radiculopathy?  Aging, injury, poor posture, extra body weight, and other issues can lead to problems in the low back. These problems may then irritate nerve roots. They include:  · Damage to a disk in the lumbar spine. The damaged disk may then press on nearby nerve roots.  · Degeneration from wear and tear, and aging. This can lead to narrowing (stenosis) of the openings between the vertebrae. The narrowed openings press on nerve roots as they leave the spinal canal.  · Unstable spine. This is when a vertebra slips forward. It can then press on a nerve root.  Other, less common things can put pressure on nerves in the low back. These include diabetes, infection, or a tumor.  Symptoms of lumbar radiculopathy  These include:  · Pain in the low back  · Pain, numbness, tingling, or weakness that travels into the buttocks, hip, groin, or leg  · Muscle spasms  Treatment for lumbar radiculopathy  In most cases, your healthcare provider will first try treatments that help relieve symptoms. These may include:  · Prescription and over-the-counter pain medicines. These help relieve pain,  swelling, and irritation.  · Limits on positions and activities that increase pain. But lying in bed or avoiding all movement is only recommended for a short period of time.  · Physical therapy, including exercises and stretches. This helps decrease pain and increase movement and function.  · Steroid shots into the lower back. This may help relieve symptoms for a time.  · Weight-loss program. If you are overweight, losing extra pounds may help relieve symptoms.  In some cases, you may need surgery to fix the underlying problem. This depends on the cause, the symptoms, and how long the pain has lasted.  Possible complications  Over time, an irritated and inflamed nerve may become damaged. This may lead to long-lasting (permanent) numbness or weakness in your legs and feet. If symptoms change suddenly or get worse, be sure to let your healthcare provider know.  When to call your healthcare provider  Call your healthcare provider right away if you have any of these:  · New pain or pain that gets worse  · New or increasing weakness, tingling, or numbness in your leg or foot  · Problems controlling your bladder or bowel   Date Last Reviewed: 3/10/2016  © 3440-6483 [employer]. [address] [place] [address]. All rights reserved. This information is not intended as a substitute for professional medical care. Always follow your healthcare professional's instructions.          Your Scheduled Appointments     May 03, 2017  9:20 AM CDT   Established Patient Visit with MD Minerva Cuadrace - Family Medicine ([facility])    [address] [place] [id number] [phone]               [facility] complies with applicable Federal civil rights laws and does not discriminate on the basis of race, color, national origin, age, disability, or sex.        Language Assistance Services     ATTENTION: Language assistance services are available, free of charge. Please  call [phone].      ATENCIÓN: Si habla español, tiene a moreno disposición servicios gratuitos de asistencia lingüística. Llame al [phone].     CHÚ Ý: N?u b?n nói Ti?ng Vi?t, có các d?ch v? h? tr? ngôn ng? mi?n phí dành cho b?n. G?i s? [phone].

## 2017-04-22 NOTE — ED PROVIDER NOTES
"Encounter Date: 2017       History     Chief Complaint   Patient presents with    Back Pain     Pt states has lower back pain, states has appt with "back surgeon" next week, states has been out of pain medication since 17. [facility] brought copy of MRI.       Review of patient's allergies indicates:   Allergen Reactions    Bactrim [sulfamethoxazole-trimethoprim] Itching    Tramadol     Vancomycin analogues Rash     Patient is a 47 y.o. female presenting with the following complaint: back pain. The history is provided by the patient.   Back Pain    This is a new problem. The current episode started several days ago. The problem occurs constantly. The pain is associated with no known injury. The pain is present in the lumbar spine. The quality of the pain is described as aching. The pain radiates to the right thigh. The pain is at a severity of 10/10. The symptoms are aggravated by twisting and certain positions. Pertinent negatives include no fever, no weight loss, no bowel incontinence, no perianal numbness, no bladder incontinence and no dysuria. She has tried analgesics for the symptoms. The treatment provided moderate relief.     Past Medical History:   Diagnosis Date    B12 deficiency anemia     Chronic pain     Depression     Epilepsy     Hematuria     Hypertension     Insomnia     Insomnia     Migraines      Past Surgical History:   Procedure Laterality Date    APPENDECTOMY      BELT ABDOMINOPLASTY      breast implants  2013     SECTION, LOW TRANSVERSE      x3    CHOLECYSTECTOMY      GASTRIC BYPASS      HYSTERECTOMY      REDUCTION MAMMAPLASTY      reduction and implants    TONSILLECTOMY, ADENOIDECTOMY       Family History   Problem Relation Age of Onset    Breast cancer Mother     Diabetes Mother     Hypertension Mother     Heart disease Mother     Cancer Mother      breast    No Known Problems Sister     No Known Problems Brother     No Known Problems Daughter  "    No Known Problems Son     Colon cancer Neg Hx     Ovarian cancer Neg Hx      Social History   Substance Use Topics    Smoking status: Never Smoker    Smokeless tobacco: None    Alcohol use No     Review of Systems   Constitutional: Negative for fever and weight loss.   Gastrointestinal: Negative for bowel incontinence.   Genitourinary: Negative for bladder incontinence and dysuria.   Musculoskeletal: Positive for back pain.   All other systems reviewed and are negative.      Physical Exam   Initial Vitals   BP Pulse Resp Temp SpO2   04/21/17 1901 04/21/17 1901 04/21/17 1901 04/21/17 1901 04/21/17 1901   127/87 97 18 97.8 °F (36.6 °C) 96 %     Physical Exam    Nursing note and vitals reviewed.  Constitutional: She appears well-developed and well-nourished.   HENT:   Head: Normocephalic and atraumatic.   Eyes: EOM are normal.   Cardiovascular: Normal rate, regular rhythm, normal heart sounds and intact distal pulses.   Pulmonary/Chest: Breath sounds normal.   Abdominal: Soft.   Musculoskeletal: Normal range of motion.        Back:    Neurological: She is alert and oriented to person, place, and time.   Skin: Skin is warm and dry.   Psychiatric: She has a normal mood and affect. Her behavior is normal. Judgment and thought content normal.         ED Course   Procedures  Labs Reviewed - No data to display                            ED Course     Clinical Impression:   The encounter diagnosis was Sciatica of right side.    Disposition:   Disposition: Discharged  Condition: Stable       Paige Vo MD  04/21/17 2399

## 2017-04-22 NOTE — DISCHARGE INSTRUCTIONS
Sciatica    Sciatica is a condition that causes pain in the lower back that spreads down into the buttock, hip, and leg. Sometimes the leg pain can happen without any back pain. Sciatica happens when a spinal nerve is irritated or has pressure put on it as comes out of the spinal canal in the lower back. This most often happens when a bulge or rupture of a nearby spinal disk presses on the nerve. Sciatica can also be caused by a narrowing of the spinal canal (spinal stenosis) or spasm of the muscle in the buttocks that the sciatic nerve passes through (pyriform muscle). Sciatica is also called lumbar radiculopathy.  Sciatica may begin after a sudden twisting or bending force, such as in a car accident. Or it can happen after a simple awkward movement. In either case, muscle spasm often also happens. Muscle spasm makes the pain worse.  A healthcare provider makes a diagnosis of sciatica from your symptoms and a physical exam. Unless you had an injury from a car accident or fall, you usually wont have X-rays taken at this time. This is because the nerves and disks in your back cant be seen on an X-ray. If the provider sees signs of a compressed nerve, you will need to schedule an MRI scan as an outpatient. Signs of a compressed nerve include loss of strength in a leg.  Most sciatica gets better with medicine, exercise, and physical therapy. If your symptoms continue after at least 3 months of medical treatment, you may need surgery or injections to your lower back.  Home care  Follow these tips when caring for yourself at home:  · You may need to stay in bed the first few days. But as soon as possible, begin sitting up or walking. This will help you avoid problems that come from staying in bed for long periods.  · When in bed, try to find a position that is comfortable. A firm mattress is best. Try lying flat on your back with pillows under your knees. You can also try lying on your side with your knees bent up  toward your chest and a pillow between your knees.  · Avoid sitting for long periods. This puts more stress on your lower back than standing or walking.  · Use heat from a hot shower, hot bath, or heating pad to help ease pain. Massage can also help. You can also try using an ice pack. You can make your own ice pack by putting ice cubes in a plastic bag. Wrap the bag in a thin towel. Try both heat and cold to see which works best. Use the method that feels best for 20 minutes several times a day.  · You may use acetaminophen or ibuprofen to ease pain, unless another pain medicine was prescribed. Note: If you have chronic liver or kidney disease, talk with your healthcare provider before taking these medicines. Also talk with your provider if youve had a stomach ulcer or gastrointestinal bleeding.  · Use safe lifting methods. Dont lift anything heavier than 15 pounds until all of the pain is gone.  Follow-up care  Follow up with your healthcare provider, or as advised. You may need physical therapy or additional tests.  If X-rays were taken, a radiologist will look at them. You will be told of any new findings that may affect your care.  When to seek medical advice  Call your healthcare provider right away if any of these occur:  · Pain gets worse even after taking prescribed medicine  · Weakness or numbness in 1 or both legs or hips  · Numbness in your groin or genital area  · You cant control your bowel or bladder  · Fever  · Redness or swelling over your back or spine   Date Last Reviewed: 8/1/2016  © 6563-1046 [employer]. [address] [place] [address], [place] [address]. All rights reserved. This information is not intended as a substitute for professional medical care. Always follow your healthcare professional's instructions.          Sciatica    Sciatica is a condition that causes pain in the lower back that spreads down into the buttock, hip, and leg. Sometimes the leg pain can happen without any  back pain. Sciatica happens when a spinal nerve is irritated or has pressure put on it as comes out of the spinal canal in the lower back. This most often happens when a bulge or rupture of a nearby spinal disk presses on the nerve. Sciatica can also be caused by a narrowing of the spinal canal (spinal stenosis) or spasm of the muscle in the buttocks that the sciatic nerve passes through (pyriform muscle). Sciatica is also called lumbar radiculopathy.  Sciatica may begin after a sudden twisting or bending force, such as in a car accident. Or it can happen after a simple awkward movement. In either case, muscle spasm often also happens. Muscle spasm makes the pain worse.  A healthcare provider makes a diagnosis of sciatica from your symptoms and a physical exam. Unless you had an injury from a car accident or fall, you usually wont have X-rays taken at this time. This is because the nerves and disks in your back cant be seen on an X-ray. If the provider sees signs of a compressed nerve, you will need to schedule an MRI scan as an outpatient. Signs of a compressed nerve include loss of strength in a leg.  Most sciatica gets better with medicine, exercise, and physical therapy. If your symptoms continue after at least 3 months of medical treatment, you may need surgery or injections to your lower back.  Home care  Follow these tips when caring for yourself at home:  · You may need to stay in bed the first few days. But as soon as possible, begin sitting up or walking. This will help you avoid problems that come from staying in bed for long periods.  · When in bed, try to find a position that is comfortable. A firm mattress is best. Try lying flat on your back with pillows under your knees. You can also try lying on your side with your knees bent up toward your chest and a pillow between your knees.  · Avoid sitting for long periods. This puts more stress on your lower back than standing or walking.  · Use heat from a  hot shower, hot bath, or heating pad to help ease pain. Massage can also help. You can also try using an ice pack. You can make your own ice pack by putting ice cubes in a plastic bag. Wrap the bag in a thin towel. Try both heat and cold to see which works best. Use the method that feels best for 20 minutes several times a day.  · You may use acetaminophen or ibuprofen to ease pain, unless another pain medicine was prescribed. Note: If you have chronic liver or kidney disease, talk with your healthcare provider before taking these medicines. Also talk with your provider if youve had a stomach ulcer or gastrointestinal bleeding.  · Use safe lifting methods. Dont lift anything heavier than 15 pounds until all of the pain is gone.  Follow-up care  Follow up with your healthcare provider, or as advised. You may need physical therapy or additional tests.  If X-rays were taken, a radiologist will look at them. You will be told of any new findings that may affect your care.  When to seek medical advice  Call your healthcare provider right away if any of these occur:  · Pain gets worse even after taking prescribed medicine  · Weakness or numbness in 1 or both legs or hips  · Numbness in your groin or genital area  · You cant control your bowel or bladder  · Fever  · Redness or swelling over your back or spine   Date Last Reviewed: 8/1/2016  © 0629-8641 [employer]. [address] [place] [address], [place] [address]. All rights reserved. This information is not intended as a substitute for professional medical care. Always follow your healthcare professional's instructions.          Understanding Lumbar Radiculopathy    Lumbar radiculopathy is irritation or inflammation of a nerve root in the low back. It causes symptoms that spread out from the back down one or both legs. To understand this condition, it helps to understand the parts of the spine:  · Vertebrae. These are bones that stack to form the spine. The  lumbar spine contains the 5 bottom vertebrae.  · Disks. These are soft pads of tissue between the vertebrae. They act as shock absorbers for the spine.  · Spinal canal. This is a tunnel formed within the stacked vertebrae. In the lumbar spine, nerves run through this canal.  · Nerves. These branch off and leave the spinal canal, traveling out to parts of the body. As they leave the spinal canal, nerves pass through openings between the vertebrae. The nerve root is the part of the nerve that is closest to the spinal canal.  · Sciatic nerve. This is a large nerve formed from several nerve roots in the low back. This nerve extends down the back of the leg to the foot.  With lumbar radiculopathy, nerve roots in the low back become irritated. This leads to pain and symptoms. The sciatic nerve is commonly involved, so the condition is often called sciatica.  What causes lumbar radiculopathy?  Aging, injury, poor posture, extra body weight, and other issues can lead to problems in the low back. These problems may then irritate nerve roots. They include:  · Damage to a disk in the lumbar spine. The damaged disk may then press on nearby nerve roots.  · Degeneration from wear and tear, and aging. This can lead to narrowing (stenosis) of the openings between the vertebrae. The narrowed openings press on nerve roots as they leave the spinal canal.  · Unstable spine. This is when a vertebra slips forward. It can then press on a nerve root.  Other, less common things can put pressure on nerves in the low back. These include diabetes, infection, or a tumor.  Symptoms of lumbar radiculopathy  These include:  · Pain in the low back  · Pain, numbness, tingling, or weakness that travels into the buttocks, hip, groin, or leg  · Muscle spasms  Treatment for lumbar radiculopathy  In most cases, your healthcare provider will first try treatments that help relieve symptoms. These may include:  · Prescription and over-the-counter pain  medicines. These help relieve pain, swelling, and irritation.  · Limits on positions and activities that increase pain. But lying in bed or avoiding all movement is only recommended for a short period of time.  · Physical therapy, including exercises and stretches. This helps decrease pain and increase movement and function.  · Steroid shots into the lower back. This may help relieve symptoms for a time.  · Weight-loss program. If you are overweight, losing extra pounds may help relieve symptoms.  In some cases, you may need surgery to fix the underlying problem. This depends on the cause, the symptoms, and how long the pain has lasted.  Possible complications  Over time, an irritated and inflamed nerve may become damaged. This may lead to long-lasting (permanent) numbness or weakness in your legs and feet. If symptoms change suddenly or get worse, be sure to let your healthcare provider know.  When to call your healthcare provider  Call your healthcare provider right away if you have any of these:  · New pain or pain that gets worse  · New or increasing weakness, tingling, or numbness in your leg or foot  · Problems controlling your bladder or bowel   Date Last Reviewed: 3/10/2016  © 7233-9475 [employer]. [address] [place] [address], [place] [address]. All rights reserved. This information is not intended as a substitute for professional medical care. Always follow your healthcare professional's instructions.

## 2017-04-26 ENCOUNTER — HOSPITAL ENCOUNTER (EMERGENCY)
Facility: HOSPITAL | Age: 47
Discharge: HOME | End: 2017-04-26
Attending: EMERGENCY MEDICINE
Payer: MEDICAID

## 2017-04-26 VITALS
HEART RATE: 120 BPM | SYSTOLIC BLOOD PRESSURE: 132 MMHG | OXYGEN SATURATION: 98 % | HEIGHT: 62 IN | RESPIRATION RATE: 18 BRPM | TEMPERATURE: 99 F | WEIGHT: 110 LBS | BODY MASS INDEX: 20.24 KG/M2 | DIASTOLIC BLOOD PRESSURE: 89 MMHG

## 2017-04-26 DIAGNOSIS — R31.9 URINARY TRACT INFECTION WITH HEMATURIA, SITE UNSPECIFIED: Primary | ICD-10-CM

## 2017-04-26 DIAGNOSIS — N39.0 URINARY TRACT INFECTION WITH HEMATURIA, SITE UNSPECIFIED: Primary | ICD-10-CM

## 2017-04-26 LAB
BACTERIA #/AREA URNS AUTO: ABNORMAL /HPF
BILIRUB UR QL STRIP: NEGATIVE
CLARITY UR REFRACT.AUTO: ABNORMAL
COLOR UR AUTO: YELLOW
GLUCOSE UR QL STRIP: ABNORMAL
HGB UR QL STRIP: NEGATIVE
KETONES UR QL STRIP: NEGATIVE
LEUKOCYTE ESTERASE UR QL STRIP: ABNORMAL
MICROSCOPIC COMMENT: ABNORMAL
NITRITE UR QL STRIP: NEGATIVE
PH UR STRIP: 6 [PH] (ref 5–8)
PROT UR QL STRIP: ABNORMAL
SP GR UR STRIP: 1.02 (ref 1–1.03)
URN SPEC COLLECT METH UR: ABNORMAL
UROBILINOGEN UR STRIP-ACNC: 1 EU/DL
WBC #/AREA URNS AUTO: 50 /HPF (ref 0–5)
YEAST UR QL AUTO: ABNORMAL

## 2017-04-26 PROCEDURE — 96372 THER/PROPH/DIAG INJ SC/IM: CPT

## 2017-04-26 PROCEDURE — 99283 EMERGENCY DEPT VISIT LOW MDM: CPT | Mod: 25

## 2017-04-26 PROCEDURE — 81000 URINALYSIS NONAUTO W/SCOPE: CPT

## 2017-04-26 PROCEDURE — 63600175 PHARM REV CODE 636 W HCPCS: Performed by: EMERGENCY MEDICINE

## 2017-04-26 RX ORDER — OXYCODONE AND ACETAMINOPHEN 10; 325 MG/1; MG/1
1 TABLET ORAL EVERY 6 HOURS PRN
Qty: 12 TABLET | Refills: 0 | Status: SHIPPED | OUTPATIENT
Start: 2017-04-26 | End: 2017-05-03

## 2017-04-26 RX ORDER — HYDROMORPHONE HYDROCHLORIDE 1 MG/ML
0.5 INJECTION, SOLUTION INTRAMUSCULAR; INTRAVENOUS; SUBCUTANEOUS
Status: DISCONTINUED | OUTPATIENT
Start: 2017-04-26 | End: 2017-04-26

## 2017-04-26 RX ORDER — NITROFURANTOIN 25; 75 MG/1; MG/1
100 CAPSULE ORAL 2 TIMES DAILY
Qty: 14 CAPSULE | Refills: 0 | Status: SHIPPED | OUTPATIENT
Start: 2017-04-26 | End: 2017-05-01

## 2017-04-26 RX ORDER — HYDROMORPHONE HYDROCHLORIDE 1 MG/ML
0.5 INJECTION, SOLUTION INTRAMUSCULAR; INTRAVENOUS; SUBCUTANEOUS
Status: COMPLETED | OUTPATIENT
Start: 2017-04-26 | End: 2017-04-26

## 2017-04-26 RX ADMIN — HYDROMORPHONE HYDROCHLORIDE 0.5 MG: 1 INJECTION, SOLUTION INTRAMUSCULAR; INTRAVENOUS; SUBCUTANEOUS at 06:04

## 2017-04-26 NOTE — ED NOTES
Patient resting quietly in bed. Patient stated pain level decreased from 9 to 7. Will continue to assess patient throughout shift.

## 2017-04-26 NOTE — ED AVS SNAPSHOT
[facility]  [address] [place] [address]               Ayana Anthony   2017  5:13 PM   ED    Description:  Female : 1970   Department:  [facility]           Your Care was Coordinated By:     Provider Role From To    Jalen Meade MD Attending Provider 17 [phone] --      Reason for Visit     Back Pain           Diagnoses this Visit        Comments    Urinary tract infection with hematuria, site unspecified    -  Primary       ED Disposition     None           To Do List           Follow-up Information     Follow up with Elieser Rollins MD In 1 week.    Specialty:  Family Medicine    Why:  If symptoms worsen    Contact information:    [address] [facility] [address]  [phone]         These Medications        Disp Refills Start End    nitrofurantoin, macrocrystal-monohydrate, (MACROBID) 100 MG capsule 14 capsule 0 2017    Take 1 capsule (100 mg total) by mouth 2 (two) times daily. - Oral    Pharmacy: [facility] [address] Ph #: [phone]       oxycodone-acetaminophen (PERCOCET)  mg per tablet 12 tablet 0 2017     Take 1 tablet by mouth every 6 (six) hours as needed for Pain. - Oral    Pharmacy: [facility] [address] Ph #: [phone]         [facility]     [facility] -  Assistance  Unless otherwise directed by your provider, please contact [facility], our nurse care line that is available for  assistance.     Registered nurses in the [facility] provide: appointment scheduling, clinical advisement, health education, and other advisory services.  Call: [phone] (toll free)               Medications           Message regarding Medications     Verify the changes and/or additions to your medication regime listed below are the same as discussed with your clinician today.  If any of these  changes or additions are incorrect, please notify your healthcare provider.        START taking these NEW medications        Refills    nitrofurantoin, macrocrystal-monohydrate, (MACROBID) 100 MG capsule 0    Sig: Take 1 capsule (100 mg total) by mouth 2 (two) times daily.    Class: Print    Route: Oral    oxycodone-acetaminophen (PERCOCET)  mg per tablet 0    Sig: Take 1 tablet by mouth every 6 (six) hours as needed for Pain.    Class: Print    Route: Oral      These medications were administered today        Dose Freq    HYDROmorphone injection 0.5 mg 0.5 mg ED 1 Time    Sig: Inject 0.5 mLs (0.5 mg total) into the muscle ED 1 Time.    Class: Normal    Route: Intramuscular           Verify that the below list of medications is an accurate representation of the medications you are currently taking.  If none reported, the list may be blank. If incorrect, please contact your healthcare provider. Carry this list with you in case of emergency.           Current Medications     albuterol (PROAIR HFA) 90 mcg/actuation inhaler Inhale 2 puffs into the lungs every 4 (four) hours as needed for Wheezing or Shortness of Breath.    amlodipine (NORVASC) 10 MG tablet Take 1 tablet (10 mg total) by mouth once daily.    cyanocobalamin 1,000 mcg/mL injection Inject 1 mL (1,000 mcg total) into the skin every 28 days.    diazePAM (VALIUM) 10 MG Tab Take 1 tablet (10 mg total) by mouth nightly as needed.    diclofenac sodium (VOLTAREN) 1 % Gel Apply 3 times daily prn    fluticasone (FLONASE) 50 mcg/actuation nasal spray TWO SPRAYS IN EACH NOSTRIL ONCE DAILY    folic acid (FOLVITE) 1 MG tablet TAKE ONE Tablet BY MOUTH EVERY DAY THANK YOU    hydrochlorothiazide (HYDRODIURIL) 25 MG tablet Take 1 tablet (25 mg total) by mouth once daily.    hydrocodone-acetaminophen 10-325mg (NORCO)  mg Tab Take 1 tablet by mouth every 4 (four) hours as needed for Pain (pain).    methocarbamol (ROBAXIN) 750 MG Tab Take 2 tablets (1,500 mg total)  "by mouth 3 (three) times daily.    nitrofurantoin, macrocrystal-monohydrate, (MACROBID) 100 MG capsule Take 1 capsule (100 mg total) by mouth 2 (two) times daily.    olanzapine (ZYPREXA) 2.5 MG tablet Take 1 tablet (2.5 mg total) by mouth every evening.    ondansetron (ZOFRAN-ODT) 4 MG TbDL Take 1 tablet (4 mg total) by mouth every 8 (eight) hours as needed.    oxcarbazepine (TRILEPTAL) 600 MG Tab Take 900 mg by mouth 2 (two) times daily. 1 1/2 am 1 1/2 pm     oxycodone-acetaminophen (PERCOCET)  mg per tablet Take 1 tablet by mouth every 6 (six) hours as needed for Pain.    potassium chloride (MICRO-K) 10 MEQ CpSR Take 2 capsules (20 mEq total) by mouth 3 (three) times daily.    predniSONE (DELTASONE) 20 MG tablet Take 2 tablets (40 mg total) by mouth once daily.    PREMARIN 1.25 mg tablet Take 1 tablet (1.25 mg total) by mouth once daily.    sertraline (ZOLOFT) 100 MG tablet Take 2 tablets (200 mg total) by mouth once daily.    trazodone (DESYREL) 150 MG tablet Take 1 tablet (150 mg total) by mouth nightly as needed for Insomnia.           Clinical Reference Information           Your Vitals Were     BP Pulse Temp Resp Height Weight    132/89 (BP Location: Left arm, Patient Position: Sitting, BP Method: Automatic) 120 98.5 °F (36.9 °C) (Oral) 18 5' 2" (1.575 m) 49.9 kg (110 lb)    SpO2 BMI             98% 20.12 kg/m2         Allergies as of 4/26/2017        Reactions    Bactrim [Sulfamethoxazole-trimethoprim] Itching    Tramadol     Vancomycin Analogues Rash      Immunizations Administered on Date of Encounter - 4/26/2017     None      ED Micro, Lab, POCT     Start Ordered       Status Ordering Provider    04/26/17 1750 04/26/17 1749    STAT,   Status:  Canceled      Canceled     04/26/17 1720 04/26/17 1719  Urinalysis  STAT      Final result     04/26/17 1719 04/26/17 1719  Urinalysis Microscopic  Once      Final result       ED Imaging Orders     None        Discharge Instructions         Bladder " "Infection, Female (Adult)    Urine is normally doesn't have any bacteria in it. But bacteria can get into the urinary tract from the skin around the rectum. Or they can travel in the blood from elsewhere in the body. Once they are in your urinary tract, they can cause infection in the urethra (urethritis), the bladder (cystitis), or the kidneys (pyelonephritis).  The most common place for an infection is in the bladder. This is called a bladder infection. This is one of the most common infections in women. Most bladder infections are easily treated. They are not serious unless the infection spreads to the kidney.  The phrases "bladder infection," "UTI," and "cystitis" are often used to describe the same thing. But they are not always the same. Cystitis is an inflammation of the bladder. The most common cause of cystitis is an infection.  Symptoms  The infection causes inflammation in the urethra and bladder. This causes many of the symptoms. The most common symptoms of a bladder infection are:  · Pain or burning when urinating  · Having to urinate more often than usual  · Urgent need to urinate  · Only a small amount of urine comes out  · Blood in urine  · Abdominal discomfort. This is usually in the lower abdomen above the pubic bone.  · Cloudy urine  · Strong- or bad-smelling urine  · Unable to urinate (urinary retention)  · Unable to hold urine in (urinary incontinence)  · Fever  · Loss of appetite  · Confusion (in older adults)  Causes  Bladder infections are not contagious. You can't get one from someone else, from a toilet seat, or from sharing a bath.  The most common cause of bladder infections is bacteria from the bowels. The bacteria get onto the skin around the opening of the urethra. From there, they can get into the urine and travel up to the bladder, causing inflammation and infection. This usually happens because of:  · Wiping improperly after urinating. Always wipe from front to back.  · Bowel " incontinence  · Pregnancy  · Procedures such as having a catheter inserted  · Older age  · Not emptying your bladder. This can allow bacteria a chance to grow in your urine.  · Dehydration  · Constipation  · Sex  · Use of a diaphragm for birth control   Treatment  Bladder infections are diagnosed by a urine test. They are treated with antibiotics and usually clear up quickly without complications. Treatment helps prevent a more serious kidney infection.  Medicines  Medicines can help in the treatment of a bladder infection:  · Take antibiotics until they are used up, even if you feel better. It is important to finish them to make sure the infection has cleared.  · You can use acetaminophen or ibuprofen for pain, fever, or discomfort, unless another medicine was prescribed. If you have chronic liver or kidney disease, talk with your healthcare provider before using these medicines. Also talk with your provider if you've ever had a stomach ulcer or gastrointestinal bleeding, or are taking blood-thinner medicines.  · If you are given phenazopydridine to reduce burning with urination, it will cause your urine to become a bright orange color. This can stain clothing.  Care and prevention  These self-care steps can help prevent future infections:  · Drink plenty of fluids to prevent dehydration and flush out your bladder. Do this unless you must restrict fluids for other health reasons, or your doctor told you not to.  · Proper cleaning after going to the bathroom is important. Wipe from front to back after using the toilet to prevent the spread of bacteria.  · Urinate more often. Don't try to hold urine in for a long time.  · Wear loose-fitting clothes and cotton underwear. Avoid tight-fitting pants.  · Improve your diet and prevent constipation. Eat more fresh fruit and vegetables, and fiber, and less junk and fatty foods.  · Avoid sex until your symptoms are gone.  · Avoid caffeine, alcohol, and spicy foods. These can  irritate your bladder.  · Urinate right after intercourse to flush out your bladder.  · If you use birth control pills and have frequent bladder infections, discuss it with your doctor.  Follow-up care  Call your healthcare provider if all symptoms are not gone after 3 days of treatment. This is especially important if you have repeat infections.  If a culture was done, you will be told if your treatment needs to be changed. If directed, you can call to find out the results.  If X-rays were done, you will be told if the results will affect your treatment.  Call 911  Call 911 if any of the following occur:  · Trouble breathing  · Hard to wake up or confusion  · Fainting or loss of consciousness  · Rapid heart rate  When to seek medical advice  Call your healthcare provider right away if any of these occur:  · Fever of 100.4ºF (38.0ºC) or higher, or as directed by your healthcare provider  · Symptoms are not better by the third day of treatment  · Back or belly (abdominal) pain that gets worse  · Repeated vomiting, or unable to keep medicine down  · Weakness or dizziness  · Vaginal discharge  · Pain, redness, or swelling in the outer vaginal area (labia)  Date Last Reviewed: 10/1/2016  © 8020-3077 [employer]. [address] [place] [address], [place] [address]. All rights reserved. This information is not intended as a substitute for professional medical care. Always follow your healthcare professional's instructions.          Your Scheduled Appointments     May 03, 2017  9:20 AM CDT   Established Patient Visit with Elieser Rollins MD   [facility] ([facility])    [address] [place] [id number] [phone]               [facility] complies with applicable Federal civil rights laws and does not discriminate on the basis of race, color, national origin, age, disability, or sex.        Language Assistance Services     ATTENTION: Language assistance services  are available, free of charge. Please call [phone].      ATENCIÓN: Si habla español, tiene a moreno disposición servicios gratuitos de asistencia lingüística. Llame al [phone].     CHÚ Ý: N?u b?n nói Ti?ng Vi?t, có các d?ch v? h? tr? ngôn ng? mi?n phí dành cho b?n. G?i s? [phone].

## 2017-04-26 NOTE — ED NOTES
Patient stated pain level has decreased from 9 to 7. Will continue to assess patient throughout shift.

## 2017-04-26 NOTE — ED PROVIDER NOTES
Encounter Date: 2017       History     Chief Complaint   Patient presents with    Back Pain     Pt states started with lower back pain x 3-4 days ago and pain is now radiating to suprapubic area.  States pressure and pain with urination.      Review of patient's allergies indicates:   Allergen Reactions    Bactrim [sulfamethoxazole-trimethoprim] Itching    Tramadol     Vancomycin analogues Rash     HPI Comments: 47-year-old female presents to the emergency room with complaints of a sensation that her bladder is falling through her vagina, chronic low back pain for which she is out of her pain medicines, and possible burning when she urinates consistent with urinary tract infection.    The history is provided by the patient.     Past Medical History:   Diagnosis Date    B12 deficiency anemia     Chronic pain     Depression     Epilepsy     Hematuria     Hypertension     Insomnia     Insomnia     Migraines      Past Surgical History:   Procedure Laterality Date    APPENDECTOMY      BELT ABDOMINOPLASTY      breast implants  2013     SECTION, LOW TRANSVERSE      x3    CHOLECYSTECTOMY      GASTRIC BYPASS      HYSTERECTOMY      REDUCTION MAMMAPLASTY      reduction and implants    TONSILLECTOMY, ADENOIDECTOMY       Family History   Problem Relation Age of Onset    Breast cancer Mother     Diabetes Mother     Hypertension Mother     Heart disease Mother     Cancer Mother      breast    No Known Problems Sister     No Known Problems Brother     No Known Problems Daughter     No Known Problems Son     Colon cancer Neg Hx     Ovarian cancer Neg Hx      Social History   Substance Use Topics    Smoking status: Never Smoker    Smokeless tobacco: None    Alcohol use No     Review of Systems   Genitourinary: Positive for difficulty urinating, dyspareunia, dysuria, frequency, pelvic pain and urgency. Negative for flank pain.        The patient also feels as if her pelvic floor is  falling out.   All other systems reviewed and are negative.      Physical Exam   Initial Vitals   BP Pulse Resp Temp SpO2   04/26/17 1658 04/26/17 1658 04/26/17 1658 04/26/17 1658 04/26/17 1658   132/89 120 18 98.5 °F (36.9 °C) 98 %     Physical Exam    Nursing note and vitals reviewed.  Constitutional: She appears well-developed and well-nourished.   HENT:   Head: Normocephalic and atraumatic.   Eyes: Pupils are equal, round, and reactive to light.   Cardiovascular: Normal rate and regular rhythm.   Pulmonary/Chest: Breath sounds normal.   Abdominal: Soft. Bowel sounds are normal.   Genitourinary: Vagina normal. No vaginal discharge found.   Genitourinary Comments: No cystocele noted.   Musculoskeletal: Normal range of motion.   Neurological: She is alert and oriented to person, place, and time. She has normal strength.   Skin: Skin is warm.   Psychiatric: She has a normal mood and affect. Thought content normal.         ED Course   Procedures  Labs Reviewed   URINALYSIS   URINALYSIS             Medical Decision Making:   Differential Diagnosis:   Pelvic floor disruption, cystocele, UTI, chronic back pain.                   ED Course     Clinical Impression:   The encounter diagnosis was Urinary tract infection with hematuria, site unspecified.          Jalen Meade MD  04/26/17 1923

## 2017-04-27 NOTE — DISCHARGE INSTRUCTIONS

## 2017-04-28 ENCOUNTER — TELEPHONE (OUTPATIENT)
Dept: FAMILY MEDICINE | Facility: CLINIC | Age: 47
End: 2017-04-28

## 2017-04-28 DIAGNOSIS — G89.29 OTHER CHRONIC PAIN: Primary | ICD-10-CM

## 2017-04-28 NOTE — TELEPHONE ENCOUNTER
----- Message from Estefany Pollock sent at 4/28/2017  1:58 PM CDT -----  Contact: Pt [phone]  Patient would like a referral to see Dr. Harish Hayden    Interventional pain medicine    [address], [place] [address]    Patient states she will also need office notes too but she will  the notes herself because she doesn't want the doctor to receive the last couple of office notes discussing pain medication issue.

## 2017-05-03 ENCOUNTER — OFFICE VISIT (OUTPATIENT)
Dept: FAMILY MEDICINE | Facility: CLINIC | Age: 47
End: 2017-05-03
Payer: MEDICAID

## 2017-05-03 VITALS
BODY MASS INDEX: 22.55 KG/M2 | SYSTOLIC BLOOD PRESSURE: 124 MMHG | HEIGHT: 62 IN | WEIGHT: 122.56 LBS | TEMPERATURE: 98 F | OXYGEN SATURATION: 100 % | HEART RATE: 100 BPM | DIASTOLIC BLOOD PRESSURE: 76 MMHG

## 2017-05-03 DIAGNOSIS — G89.29 OTHER CHRONIC PAIN: Primary | ICD-10-CM

## 2017-05-03 DIAGNOSIS — R53.83 FATIGUE, UNSPECIFIED TYPE: ICD-10-CM

## 2017-05-03 PROCEDURE — 99213 OFFICE O/P EST LOW 20 MIN: CPT | Mod: S$GLB,,, | Performed by: FAMILY MEDICINE

## 2017-05-03 RX ORDER — OXYCODONE HYDROCHLORIDE 30 MG/1
30 TABLET ORAL
COMMUNITY
End: 2017-05-15

## 2017-05-03 NOTE — PROGRESS NOTES
Subjective:      Patient ID: Ayana Anthony is a 47 y.o. female.    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    HENT: Negative.    Respiratory: Negative.    Cardiovascular: Negative.    Gastrointestinal: Negative.    Endocrine: Negative.    Genitourinary: Negative.    Musculoskeletal: Positive for back pain.   Psychiatric/Behavioral: Negative.    All other systems reviewed and are negative.    Objective:     Physical Exam   Constitutional: She is oriented to person, place, and time. She appears well-developed and well-nourished.   HENT:   Head: Normocephalic.   Eyes: Conjunctivae and EOM are normal. Pupils are equal, round, and reactive to light.   Neck: Normal range of motion. Neck supple.   Cardiovascular: Normal rate, regular rhythm and normal heart sounds.    Pulmonary/Chest: Effort normal and breath sounds normal.   Musculoskeletal: Normal range of motion.   Neurological: She is alert and oriented to person, place, and time. She has normal reflexes.   Skin: Skin is warm and dry.   Psychiatric: She has a normal mood and affect. Her behavior is normal. Judgment and thought content normal.   Nursing note and vitals reviewed.    Assessment:     1. Other chronic pain    2. Fatigue, unspecified type      Plan:     New Prescriptions    No medications on file     Discontinued Medications    HYDROCODONE-ACETAMINOPHEN 10-325MG (NORCO)  MG TAB    Take 1 tablet by mouth every 4 (four) hours as needed for Pain (pain).    OXYCODONE-ACETAMINOPHEN (PERCOCET)  MG PER TABLET    Take 1 tablet by mouth every 6 (six) hours  as needed for Pain.     Modified Medications    No medications on file       Other chronic pain  -     Sedimentation rate, manual; Future  -     Cancel: SHELLY; Future; Expected date: 5/3/17  -     Rheumatoid factor; Future; Expected date: 5/3/17  -     C-reactive protein; Future; Expected date: 5/3/17  -     ANTI -SSA ANTIBODY; Future; Expected date: 5/3/17  -     ANTI-SSB ANTIBODY; Future; Expected date: 5/3/17  -     SHELLY; Future; Expected date: 5/3/17    Fatigue, unspecified type  -     Sedimentation rate, manual; Future  -     Cancel: SHELLY; Future; Expected date: 5/3/17  -     Rheumatoid factor; Future; Expected date: 5/3/17  -     C-reactive protein; Future; Expected date: 5/3/17  -     ANTI -SSA ANTIBODY; Future; Expected date: 5/3/17  -     ANTI-SSB ANTIBODY; Future; Expected date: 5/3/17  -     SHELLY; Future; Expected date: 5/3/17    Other orders  -     Sedimentation rate, automated

## 2017-05-03 NOTE — MR AVS SNAPSHOT
[facility]  [address] [place] [address]  Cristiane GIBBS [address]  Phone: [phone]  Fax: [phone]                  Ayana Anthony   5/3/2017 9:20 AM   Office Visit    Description:  Female : 1970   Provider:  Elieser Rollins MD   Department:  [facility]           Reason for Visit     Back Pain     Follow-up           Diagnoses this Visit        Comments    Other chronic pain    -  Primary     Fatigue, unspecified type                To Do List           Goals (5 Years of Data)     None      Follow-Up and Disposition     Return in about 3 months (around 8/3/2017).      [facility]     [facility] Nurse Care Line -  Assistance  Unless otherwise directed by your provider, please contact [facility], our nurse care line that is available for  assistance.     Registered nurses in the [facility] Center provide: appointment scheduling, clinical advisement, health education, and other advisory services.  Call: [phone] (toll free)               Medications           Message regarding Medications     Verify the changes and/or additions to your medication regime listed below are the same as discussed with your clinician today.  If any of these changes or additions are incorrect, please notify your healthcare provider.        STOP taking these medications     hydrocodone-acetaminophen 10-325mg (NORCO)  mg Tab Take 1 tablet by mouth every 4 (four) hours as needed for Pain (pain).    oxycodone-acetaminophen (PERCOCET)  mg per tablet Take 1 tablet by mouth every 6 (six) hours as needed for Pain.           Verify that the below list of medications is an accurate representation of the medications you are currently taking.  If none reported, the list may be blank. If incorrect, please contact your healthcare provider. Carry this list with you in case of emergency.           Current Medications     albuterol (PROAIR HFA) 90 mcg/actuation inhaler Inhale 2  "puffs into the lungs every 4 (four) hours as needed for Wheezing or Shortness of Breath.    amlodipine (NORVASC) 10 MG tablet Take 1 tablet (10 mg total) by mouth once daily.    cyanocobalamin 1,000 mcg/mL injection Inject 1 mL (1,000 mcg total) into the skin every 28 days.    diazePAM (VALIUM) 10 MG Tab Take 1 tablet (10 mg total) by mouth nightly as needed.    diclofenac sodium (VOLTAREN) 1 % Gel Apply 3 times daily prn    fluticasone (FLONASE) 50 mcg/actuation nasal spray TWO SPRAYS IN EACH NOSTRIL ONCE DAILY    folic acid (FOLVITE) 1 MG tablet TAKE ONE Tablet BY MOUTH EVERY DAY THANK YOU    hydrochlorothiazide (HYDRODIURIL) 25 MG tablet Take 1 tablet (25 mg total) by mouth once daily.    olanzapine (ZYPREXA) 2.5 MG tablet Take 1 tablet (2.5 mg total) by mouth every evening.    ondansetron (ZOFRAN-ODT) 4 MG TbDL Take 1 tablet (4 mg total) by mouth every 8 (eight) hours as needed.    oxcarbazepine (TRILEPTAL) 600 MG Tab Take 900 mg by mouth 2 (two) times daily. 1 1/2 am 1 1/2 pm     oxycodone (ROXICODONE) 30 MG Tab Take 30 mg by mouth. Take 1 tablet by mouth 5 times daily as needed    potassium chloride (MICRO-K) 10 MEQ CpSR Take 2 capsules (20 mEq total) by mouth 3 (three) times daily.    PREMARIN 1.25 mg tablet Take 1 tablet (1.25 mg total) by mouth once daily.    sertraline (ZOLOFT) 100 MG tablet Take 2 tablets (200 mg total) by mouth once daily.    trazodone (DESYREL) 150 MG tablet Take 1 tablet (150 mg total) by mouth nightly as needed for Insomnia.           Clinical Reference Information           Your Vitals Were     BP Pulse Temp Height Weight SpO2    124/76 100 97.8 °F (36.6 °C) (Oral) 5' 2" (1.575 m) 55.6 kg (122 lb 9.2 oz) 100%    BMI                22.42 kg/m2          Blood Pressure          Most Recent Value    BP  124/76      Allergies as of 5/3/2017     Bactrim [Sulfamethoxazole-trimethoprim]    Tramadol    Vancomycin Analogues      Immunizations Administered on Date of Encounter - 5/3/2017     " None      Orders Placed During Today's Visit      Normal Orders This Visit    C-reactive protein     Rheumatoid factor     Sedimentation rate, automated     Future Labs/Procedures Expected by Expires    SHELLY  5/3/2017 7/2/2018    ANTI -SSA ANTIBODY  5/3/2017 7/2/2018    ANTI-SSB ANTIBODY  5/3/2017 7/2/2018    C-reactive protein  5/3/2017 7/2/2018    Rheumatoid factor  5/3/2017 7/2/2018    Sedimentation rate, manual  As directed 5/3/2018      Language Assistance Services     ATTENTION: Language assistance services are available, free of charge. Please call [phone].      ATENCIÓN: Si spla isela, tiene a moreno disposición servicios gratuitos de asistencia lingüística. Llame al [phone].     CHÚ Ý: N?u b?n nói Ti?ng Vi?t, có các d?ch v? h? tr? ngôn ng? mi?n phí dành cho b?n. G?i s? [phone].         [facility] complies with applicable Federal civil rights laws and does not discriminate on the basis of race, color, national origin, age, disability, or sex.

## 2017-05-04 ENCOUNTER — TELEPHONE (OUTPATIENT)
Dept: FAMILY MEDICINE | Facility: CLINIC | Age: 47
End: 2017-05-04

## 2017-05-04 NOTE — TELEPHONE ENCOUNTER
----- Message from Estefany Pollock sent at 5/4/2017  2:38 PM CDT -----  Contact: Pt [phone]  Patient would like MRI and office notes stating that Dr. Rollins treated her for her pain. Please fax to medicaid because she's switching insurance to [place] [employer] be cause no one takes [employer] medicaid. Please fax to: [phone]

## 2017-05-04 NOTE — TELEPHONE ENCOUNTER
Pt called requesting to speak to Dr Rollins in reference to her Pain Mgmt Referral.  She states that she did not want to discuss it with me.      [phone]

## 2017-05-05 LAB
CRP SERPL-MCNC: <0.1 MG/DL
ERYTHROCYTE [SEDIMENTATION RATE] IN BLOOD BY WESTERGREN METHOD: 9 MM/H
RHEUMATOID FACT SERPL-ACNC: 7 IU/ML

## 2017-05-08 LAB
ENA SS-A AB SER IA-ACNC: NORMAL AI
ENA SS-B AB SER IA-ACNC: NORMAL AI

## 2017-05-15 ENCOUNTER — HOSPITAL ENCOUNTER (EMERGENCY)
Facility: HOSPITAL | Age: 47
Discharge: HOME | End: 2017-05-15
Attending: FAMILY MEDICINE
Payer: MEDICAID

## 2017-05-15 ENCOUNTER — TELEPHONE (OUTPATIENT)
Dept: FAMILY MEDICINE | Facility: CLINIC | Age: 47
End: 2017-05-15

## 2017-05-15 VITALS
HEIGHT: 62 IN | DIASTOLIC BLOOD PRESSURE: 90 MMHG | OXYGEN SATURATION: 99 % | SYSTOLIC BLOOD PRESSURE: 138 MMHG | RESPIRATION RATE: 16 BRPM | BODY MASS INDEX: 20.61 KG/M2 | WEIGHT: 112 LBS | TEMPERATURE: 98 F | HEART RATE: 112 BPM

## 2017-05-15 DIAGNOSIS — G89.29 CHRONIC RIGHT-SIDED LOW BACK PAIN WITH RIGHT-SIDED SCIATICA: Primary | ICD-10-CM

## 2017-05-15 DIAGNOSIS — M54.2 NECK PAIN: Primary | ICD-10-CM

## 2017-05-15 DIAGNOSIS — M54.41 CHRONIC RIGHT-SIDED LOW BACK PAIN WITH RIGHT-SIDED SCIATICA: Primary | ICD-10-CM

## 2017-05-15 PROCEDURE — 63600175 PHARM REV CODE 636 W HCPCS: Performed by: FAMILY MEDICINE

## 2017-05-15 PROCEDURE — 96372 THER/PROPH/DIAG INJ SC/IM: CPT

## 2017-05-15 PROCEDURE — 99283 EMERGENCY DEPT VISIT LOW MDM: CPT | Mod: 25

## 2017-05-15 RX ORDER — HYDROMORPHONE HYDROCHLORIDE 1 MG/ML
1 INJECTION, SOLUTION INTRAMUSCULAR; INTRAVENOUS; SUBCUTANEOUS
Status: COMPLETED | OUTPATIENT
Start: 2017-05-15 | End: 2017-05-15

## 2017-05-15 RX ORDER — MORPHINE SULFATE 15 MG/1
15 TABLET ORAL EVERY 4 HOURS PRN
Qty: 20 TABLET | Refills: 0 | Status: SHIPPED | OUTPATIENT
Start: 2017-05-15 | End: 2017-06-26

## 2017-05-15 RX ADMIN — HYDROMORPHONE HYDROCHLORIDE 1 MG: 1 INJECTION, SOLUTION INTRAMUSCULAR; INTRAVENOUS; SUBCUTANEOUS at 06:05
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
## 2017-05-15 NOTE — ED AVS SNAPSHOT
[facility]  [address]  [place] [address]               Ayana Anthony   5/15/2017  6:04 PM   ED    Description:  Female : 1970   Department:  [facility]           Your Care was Coordinated By:     Provider Role From To    Paulo Garner MD Attending Provider 05/15/17 1666 --      Reason for Visit     Back Pain           Diagnoses this Visit        Comments    Chronic right-sided low back pain with right-sided sciatica    -  Primary       ED Disposition     None           To Do List           Follow-up Information     Follow up with Elieser Rollins MD In 1 week.    Specialty:  Family Medicine    Contact information:    [address] [facility] [address]  [phone]         These Medications        Disp Refills Start End    morphine (MSIR) 15 MG tablet 20 tablet 0 5/15/2017     Take 1 tablet (15 mg total) by mouth every 4 (four) hours as needed for Pain. - Oral    Pharmacy: [address] #: [phone]         [facility]     [facility] -  Assistance  Unless otherwise directed by your provider, please contact [facility], our nurse care line that is available for  assistance.     Registered nurses in the [facility] provide: appointment scheduling, clinical advisement, health education, and other advisory services.  Call: [phone] (toll free)               Medications           Message regarding Medications     Verify the changes and/or additions to your medication regime listed below are the same as discussed with your clinician today.  If any of these changes or additions are incorrect, please notify your healthcare provider.        START taking these NEW medications        Refills    morphine (MSIR) 15 MG tablet 0    Sig: Take 1 tablet (15 mg total) by mouth every 4 (four) hours as needed for Pain.    Class: Print    Route: Oral      These  medications were administered today        Dose Freq    HYDROmorphone injection 1 mg 1 mg ED 1 Time    Sig: Inject 1 mL (1 mg total) into the muscle ED 1 Time.    Class: Normal    Route: Intramuscular      STOP taking these medications     oxycodone (ROXICODONE) 30 MG Tab Take 30 mg by mouth. Take 1 tablet by mouth 5 times daily as needed           Verify that the below list of medications is an accurate representation of the medications you are currently taking.  If none reported, the list may be blank. If incorrect, please contact your healthcare provider. Carry this list with you in case of emergency.           Current Medications     albuterol (PROAIR HFA) 90 mcg/actuation inhaler Inhale 2 puffs into the lungs every 4 (four) hours as needed for Wheezing or Shortness of Breath.    amlodipine (NORVASC) 10 MG tablet Take 1 tablet (10 mg total) by mouth once daily.    cyanocobalamin 1,000 mcg/mL injection Inject 1 mL (1,000 mcg total) into the skin every 28 days.    diazePAM (VALIUM) 10 MG Tab Take 1 tablet (10 mg total) by mouth nightly as needed.    diclofenac sodium (VOLTAREN) 1 % Gel Apply 3 times daily prn    fluticasone (FLONASE) 50 mcg/actuation nasal spray TWO SPRAYS IN EACH NOSTRIL ONCE DAILY    folic acid (FOLVITE) 1 MG tablet TAKE ONE Tablet BY MOUTH EVERY DAY THANK YOU    hydrochlorothiazide (HYDRODIURIL) 25 MG tablet Take 1 tablet (25 mg total) by mouth once daily.    oxcarbazepine (TRILEPTAL) 600 MG Tab Take 900 mg by mouth 2 (two) times daily. 1 1/2 am 1 1/2 pm     potassium chloride (MICRO-K) 10 MEQ CpSR Take 2 capsules (20 mEq total) by mouth 3 (three) times daily.    PREMARIN 1.25 mg tablet Take 1 tablet (1.25 mg total) by mouth once daily.    sertraline (ZOLOFT) 100 MG tablet Take 2 tablets (200 mg total) by mouth once daily.    trazodone (DESYREL) 150 MG tablet Take 1 tablet (150 mg total) by mouth nightly as needed for Insomnia.    morphine (MSIR) 15 MG tablet Take 1 tablet (15 mg total) by  "mouth every 4 (four) hours as needed for Pain.    olanzapine (ZYPREXA) 2.5 MG tablet Take 1 tablet (2.5 mg total) by mouth every evening.    ondansetron (ZOFRAN-ODT) 4 MG TbDL Take 1 tablet (4 mg total) by mouth every 8 (eight) hours as needed.           Clinical Reference Information           Your Vitals Were     BP Pulse Temp Resp Height Weight    145/87 112 97.8 °F (36.6 °C) (Oral) 15 5' 2" (1.575 m) 50.8 kg (112 lb)    SpO2 BMI             99% 20.49 kg/m2         Allergies as of 5/15/2017        Reactions    Bactrim [Sulfamethoxazole-trimethoprim] Itching    Tramadol     Vancomycin Analogues Rash      Immunizations Administered on Date of Encounter - 5/15/2017     None      ED Micro, Lab, POCT     None      ED Imaging Orders     None        Discharge Instructions         Back Pain (Acute or Chronic)    Back pain is one of the most common problems. The good news is that most people feel better in 1 to 2 weeks, and most of the rest in 1 to 2 months. Most people can remain active.  People experience and describe pain differently; not everyone is the same.  · The pain can be sharp, stabbing, shooting, aching, cramping or burning.  · Movement, standing, bending, lifting, sitting, or walking may worsen pain.  · It can be localized to one spot or area, or it can be more generalized.  · It can spread or radiate upwards, to the front, or go down your arms or legs (sciatica).  · It can cause muscle spasm.  Most of the time, mechanical problems with the muscles or spine cause the pain. Mechanical problems are usually caused by an injury to the muscles or ligaments. While illness can cause back pain, it is usually not caused by a serious illness. Mechanical problems include:   · Physical activity such as sports, exercise, work, or normal activity  · Overexertion, lifting, pushing, pulling incorrectly or too aggressively  · Sudden twisting, bending, or stretching from an accident, or accidental movement  · Poor " posture  · Stretching or moving wrong, without noticing pain at the time  · Poor coordination, lack of regular exercise (check with your doctor about this)  · Spinal disc disease or arthritis  · Stress  Pain can also be related to pregnancy, or illness like appendicitis, bladder or kidney infections, pelvic infections, and many other things.  Acute back pain usually gets better in 1 to 2 weeks. Back pain related to disk disease, arthritis in the spinal joints or spinal stenosis (narrowing of the spinal canal) can become chronic and last for months or years.  Unless you had a physical injury (for example, a car accident or fall) X-rays are usually not needed for the initial evaluation of back pain. If pain continues and does not respond to medical treatment, X-rays and other tests may be needed.  Home care  Try these home care recommendations:  · When in bed, try to find a position of comfort. A firm mattress is best. Try lying flat on your back with pillows under your knees. You can also try lying on your side with your knees bent up towards your chest and a pillow between your knees.  · At first, do not try to stretch out the sore spots. If there is a strain, it is not like the good soreness you get after exercising without an injury. In this case, stretching may make it worse.  · Avoid prolong sitting, long car rides, or travel. This puts more stress on the lower back than standing or walking.  · During the first 24 to 72 hours after an acute injury or flare up of chronic back pain, apply an ice pack to the painful area for 20 minutes and then remove it for 20 minutes. Do this over a period of 60 to 90 minutes or several times a day. This will reduce swelling and pain. Wrap the ice pack in a thin towel or plastic to protect your skin.  · You can start with ice, then switch to heat. Heat (hot shower, hot bath, or heating pad) reduces pain and works well for muscle spasms. Heat can be applied to the painful area for  20 minutes then remove it for 20 minutes. Do this over a period of 60 to 90 minutes or several times a day. Do not sleep on a heating pad. It can lead to skin burns or tissue damage.  · You can alternate ice and heat therapy. Talk with your doctor about the best treatment for your back pain.  · Therapeutic massage can help relax the back muscles without stretching them.  · Be aware of safe lifting methods and do not lift anything without stretching first.  Medicines  Talk to your doctor before using medicine, especially if you have other medical problems or are taking other medicines.  · You may use over-the-counter medicine as directed on the bottle to control pain, unless another pain medicine was prescribed. If you have chronic conditions like diabetes, liver or kidney disease, stomach ulcers, or gastrointestinal bleeding, or are taking blood thinners, talk to your doctor before taking any medicine.  · Be careful if you are given a prescription medicines, narcotics, or medicine for muscle spasms. They can cause drowsiness, affect your coordination, reflexes, and judgement. Do not drive or operate heavy machinery.  Follow-up care  Follow up with your healthcare provider, or as advised.   A radiologist will review any X-rays that were taken. Your provide will notify you of any new findings that may affect your care.  Call 911  Call emergency services if any of the following occur:  · Trouble breathing  · Confusion  · Very drowsy or trouble awakening  · Fainting or loss of consciousness  · Rapid or very slow heart rate  · Loss of bowel or bladder control  When to seek medical advice  Call your healthcare provider right away if any of these occur:   · Pain becomes worse or spreads to your legs  · Weakness or numbness in one or both legs  · Numbness in the groin or genital area  Date Last Reviewed: 7/1/2016  © 4777-1559 [employer]. [address] [place] [address], [place] [address]. All rights reserved. This  information is not intended as a substitute for professional medical care. Always follow your healthcare professional's instructions.           [facility] complies with applicable Federal civil rights laws and does not discriminate on the basis of race, color, national origin, age, disability, or sex.        Language Assistance Services     ATTENTION: Language assistance services are available, free of charge. Please call [phone].      ATENCIÓN: Si habla español, tiene a moreno disposición servicios gratuitos de asistencia lingüística. Llame al [phone].     [facility] Ý: N?u b?n nói Ti?ng Vi?t, có các d?ch v? h? tr? ngôn ng? mi?n phí dành cho b?n. G?i s? [phone].

## 2017-05-15 NOTE — TELEPHONE ENCOUNTER
----- Message from Nydia Young sent at 5/15/2017  2:14 PM CDT -----  Patient needs referral & office notes sent to Dr Satnam Martinez. This is needed prior to them scheduling her an appointment for her back  Ph# [phone]  Fx# [phone]
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
## 2017-05-16 ENCOUNTER — HOSPITAL ENCOUNTER (EMERGENCY)
Facility: HOSPITAL | Age: 47
Discharge: HOME | End: 2017-05-16
Attending: FAMILY MEDICINE
Payer: MEDICAID

## 2017-05-16 VITALS
BODY MASS INDEX: 20.61 KG/M2 | WEIGHT: 112 LBS | TEMPERATURE: 99 F | RESPIRATION RATE: 22 BRPM | SYSTOLIC BLOOD PRESSURE: 119 MMHG | OXYGEN SATURATION: 99 % | DIASTOLIC BLOOD PRESSURE: 75 MMHG | HEART RATE: 113 BPM | HEIGHT: 62 IN

## 2017-05-16 DIAGNOSIS — T50.905A MEDICATION ADVERSE EFFECT, INITIAL ENCOUNTER: ICD-10-CM

## 2017-05-16 DIAGNOSIS — M54.41 CHRONIC RIGHT-SIDED LOW BACK PAIN WITH RIGHT-SIDED SCIATICA: Primary | ICD-10-CM

## 2017-05-16 DIAGNOSIS — G89.29 CHRONIC RIGHT-SIDED LOW BACK PAIN WITH RIGHT-SIDED SCIATICA: Primary | ICD-10-CM

## 2017-05-16 PROCEDURE — 99283 EMERGENCY DEPT VISIT LOW MDM: CPT | Mod: 25

## 2017-05-16 PROCEDURE — 96372 THER/PROPH/DIAG INJ SC/IM: CPT

## 2017-05-16 PROCEDURE — 63600175 PHARM REV CODE 636 W HCPCS: Performed by: FAMILY MEDICINE

## 2017-05-16 PROCEDURE — 25000003 PHARM REV CODE 250: Performed by: FAMILY MEDICINE

## 2017-05-16 RX ORDER — OXYCODONE AND ACETAMINOPHEN 10; 325 MG/1; MG/1
1 TABLET ORAL EVERY 12 HOURS PRN
Qty: 18 TABLET | Refills: 0 | Status: SHIPPED | OUTPATIENT
Start: 2017-05-16 | End: 2017-06-26

## 2017-05-16 RX ORDER — DIPHENHYDRAMINE HYDROCHLORIDE 50 MG/ML
50 INJECTION INTRAMUSCULAR; INTRAVENOUS
Status: COMPLETED | OUTPATIENT
Start: 2017-05-16 | End: 2017-05-16

## 2017-05-16 RX ORDER — ONDANSETRON 4 MG/1
4 TABLET, ORALLY DISINTEGRATING ORAL
Status: COMPLETED | OUTPATIENT
Start: 2017-05-16 | End: 2017-05-16

## 2017-05-16 RX ADMIN — ONDANSETRON 4 MG: 4 TABLET, ORALLY DISINTEGRATING ORAL at 10:05

## 2017-05-16 RX ADMIN — DIPHENHYDRAMINE HYDROCHLORIDE 50 MG: 50 INJECTION, SOLUTION INTRAMUSCULAR; INTRAVENOUS at 10:05

## 2017-05-16 NOTE — DISCHARGE INSTRUCTIONS
Back Pain (Acute or Chronic)    Back pain is one of the most common problems. The good news is that most people feel better in 1 to 2 weeks, and most of the rest in 1 to 2 months. Most people can remain active.  People experience and describe pain differently; not everyone is the same.  · The pain can be sharp, stabbing, shooting, aching, cramping or burning.  · Movement, standing, bending, lifting, sitting, or walking may worsen pain.  · It can be localized to one spot or area, or it can be more generalized.  · It can spread or radiate upwards, to the front, or go down your arms or legs (sciatica).  · It can cause muscle spasm.  Most of the time, mechanical problems with the muscles or spine cause the pain. Mechanical problems are usually caused by an injury to the muscles or ligaments. While illness can cause back pain, it is usually not caused by a serious illness. Mechanical problems include:   · Physical activity such as sports, exercise, work, or normal activity  · Overexertion, lifting, pushing, pulling incorrectly or too aggressively  · Sudden twisting, bending, or stretching from an accident, or accidental movement  · Poor posture  · Stretching or moving wrong, without noticing pain at the time  · Poor coordination, lack of regular exercise (check with your doctor about this)  · Spinal disc disease or arthritis  · Stress  Pain can also be related to pregnancy, or illness like appendicitis, bladder or kidney infections, pelvic infections, and many other things.  Acute back pain usually gets better in 1 to 2 weeks. Back pain related to disk disease, arthritis in the spinal joints or spinal stenosis (narrowing of the spinal canal) can become chronic and last for months or years.  Unless you had a physical injury (for example, a car accident or fall) X-rays are usually not needed for the initial evaluation of back pain. If pain continues and does not respond to medical treatment, X-rays and other tests may be  needed.  Home care  Try these home care recommendations:  · When in bed, try to find a position of comfort. A firm mattress is best. Try lying flat on your back with pillows under your knees. You can also try lying on your side with your knees bent up towards your chest and a pillow between your knees.  · At first, do not try to stretch out the sore spots. If there is a strain, it is not like the good soreness you get after exercising without an injury. In this case, stretching may make it worse.  · Avoid prolong sitting, long car rides, or travel. This puts more stress on the lower back than standing or walking.  · During the first 24 to 72 hours after an acute injury or flare up of chronic back pain, apply an ice pack to the painful area for 20 minutes and then remove it for 20 minutes. Do this over a period of 60 to 90 minutes or several times a day. This will reduce swelling and pain. Wrap the ice pack in a thin towel or plastic to protect your skin.  · You can start with ice, then switch to heat. Heat (hot shower, hot bath, or heating pad) reduces pain and works well for muscle spasms. Heat can be applied to the painful area for 20 minutes then remove it for 20 minutes. Do this over a period of 60 to 90 minutes or several times a day. Do not sleep on a heating pad. It can lead to skin burns or tissue damage.  · You can alternate ice and heat therapy. Talk with your doctor about the best treatment for your back pain.  · Therapeutic massage can help relax the back muscles without stretching them.  · Be aware of safe lifting methods and do not lift anything without stretching first.  Medicines  Talk to your doctor before using medicine, especially if you have other medical problems or are taking other medicines.  · You may use over-the-counter medicine as directed on the bottle to control pain, unless another pain medicine was prescribed. If you have chronic conditions like diabetes, liver or kidney disease,  stomach ulcers, or gastrointestinal bleeding, or are taking blood thinners, talk to your doctor before taking any medicine.  · Be careful if you are given a prescription medicines, narcotics, or medicine for muscle spasms. They can cause drowsiness, affect your coordination, reflexes, and judgement. Do not drive or operate heavy machinery.  Follow-up care  Follow up with your healthcare provider, or as advised.   A radiologist will review any X-rays that were taken. Your provide will notify you of any new findings that may affect your care.  Call 911  Call emergency services if any of the following occur:  · Trouble breathing  · Confusion  · Very drowsy or trouble awakening  · Fainting or loss of consciousness  · Rapid or very slow heart rate  · Loss of bowel or bladder control  When to seek medical advice  Call your healthcare provider right away if any of these occur:   · Pain becomes worse or spreads to your legs  · Weakness or numbness in one or both legs  · Numbness in the groin or genital area  Date Last Reviewed: 7/1/2016  © 3073-2075 [employer]. [address] [place] [address], [place] [address]. All rights reserved. This information is not intended as a substitute for professional medical care. Always follow your healthcare professional's instructions.

## 2017-05-16 NOTE — ED AVS SNAPSHOT
[facility]  [address] [place] [id number]               Ayana Anthony   2017  9:54 AM   ED    Description:  Female : 1970   Department:  [facility]           Your Care was Coordinated By:     Provider Role From To    Paulo Garner MD Attending Provider 17 1000 --      Reason for Visit     Itching           Diagnoses this Visit        Comments    Chronic right-sided low back pain with right-sided sciatica    -  Primary       ED Disposition     None           To Do List           Follow-up Information     Follow up with Elieser Rollins MD In 1 week.    Specialty:  Family Medicine    Contact information:    [address] [facility] [address]  [phone]         These Medications        Disp Refills Start End    oxycodone-acetaminophen (PERCOCET)  mg per tablet 18 tablet 0 2017     Take 1 tablet by mouth every 12 (twelve) hours as needed for Pain. - Oral    Pharmacy: [address] Ph #: [phone]         [facility]     [facility] -  Assistance  Unless otherwise directed by your provider, please contact [facility], our nurse care line that is available for  assistance.     Registered nurses in the [facility] provide: appointment scheduling, clinical advisement, health education, and other advisory services.  Call: [phone] (toll free)               Medications           Message regarding Medications     Verify the changes and/or additions to your medication regime listed below are the same as discussed with your clinician today.  If any of these changes or additions are incorrect, please notify your healthcare provider.        START taking these NEW medications        Refills    oxycodone-acetaminophen (PERCOCET)  mg per tablet 0    Sig: Take 1 tablet by mouth every 12 (twelve) hours as needed for Pain.    Class: Print     Route: Oral      These medications were administered today        Dose Freq    ondansetron disintegrating tablet 4 mg 4 mg ED 1 Time    Sig: Take 1 tablet (4 mg total) by mouth ED 1 Time.    Class: Normal    Route: Oral    diphenhydrAMINE injection 50 mg 50 mg ED 1 Time    Sig: Inject 1 mL (50 mg total) into the muscle ED 1 Time.    Class: Normal    Route: Intramuscular           Verify that the below list of medications is an accurate representation of the medications you are currently taking.  If none reported, the list may be blank. If incorrect, please contact your healthcare provider. Carry this list with you in case of emergency.           Current Medications     amlodipine (NORVASC) 10 MG tablet Take 1 tablet (10 mg total) by mouth once daily.    cyanocobalamin 1,000 mcg/mL injection Inject 1 mL (1,000 mcg total) into the skin every 28 days.    fluticasone (FLONASE) 50 mcg/actuation nasal spray TWO SPRAYS IN EACH NOSTRIL ONCE DAILY    folic acid (FOLVITE) 1 MG tablet TAKE ONE Tablet BY MOUTH EVERY DAY THANK YOU    hydrochlorothiazide (HYDRODIURIL) 25 MG tablet Take 1 tablet (25 mg total) by mouth once daily.    morphine (MSIR) 15 MG tablet Take 1 tablet (15 mg total) by mouth every 4 (four) hours as needed for Pain.    oxcarbazepine (TRILEPTAL) 600 MG Tab Take 900 mg by mouth 2 (two) times daily. 1 1/2 am 1 1/2 pm     potassium chloride (MICRO-K) 10 MEQ CpSR Take 2 capsules (20 mEq total) by mouth 3 (three) times daily.    PREMARIN 1.25 mg tablet Take 1 tablet (1.25 mg total) by mouth once daily.    sertraline (ZOLOFT) 100 MG tablet Take 2 tablets (200 mg total) by mouth once daily.    trazodone (DESYREL) 150 MG tablet Take 1 tablet (150 mg total) by mouth nightly as needed for Insomnia.    albuterol (PROAIR HFA) 90 mcg/actuation inhaler Inhale 2 puffs into the lungs every 4 (four) hours as needed for Wheezing or Shortness of Breath.    diazePAM (VALIUM) 10 MG Tab Take 1 tablet (10 mg total) by mouth  "nightly as needed.    diclofenac sodium (VOLTAREN) 1 % Gel Apply 3 times daily prn    diphenhydrAMINE injection 50 mg Inject 1 mL (50 mg total) into the muscle ED 1 Time.    olanzapine (ZYPREXA) 2.5 MG tablet Take 1 tablet (2.5 mg total) by mouth every evening.    ondansetron (ZOFRAN-ODT) 4 MG TbDL Take 1 tablet (4 mg total) by mouth every 8 (eight) hours as needed.    ondansetron disintegrating tablet 4 mg Take 1 tablet (4 mg total) by mouth ED 1 Time.    oxycodone-acetaminophen (PERCOCET)  mg per tablet Take 1 tablet by mouth every 12 (twelve) hours as needed for Pain.           Clinical Reference Information           Your Vitals Were     BP Pulse Temp Resp Height Weight    119/75 (BP Location: Left arm, Patient Position: Sitting) 113 98.9 °F (37.2 °C) (Oral) 22 5' 2" (1.575 m) 50.8 kg (112 lb)    SpO2 BMI             99% 20.49 kg/m2         Allergies as of 5/16/2017        Reactions    Bactrim [Sulfamethoxazole-trimethoprim] Itching    Tramadol     Vancomycin Analogues Rash      Immunizations Administered on Date of Encounter - 5/16/2017     None      ED Micro, Lab, POCT     None      ED Imaging Orders     None        Discharge Instructions         Back Pain (Acute or Chronic)    Back pain is one of the most common problems. The good news is that most people feel better in 1 to 2 weeks, and most of the rest in 1 to 2 months. Most people can remain active.  People experience and describe pain differently; not everyone is the same.  · The pain can be sharp, stabbing, shooting, aching, cramping or burning.  · Movement, standing, bending, lifting, sitting, or walking may worsen pain.  · It can be localized to one spot or area, or it can be more generalized.  · It can spread or radiate upwards, to the front, or go down your arms or legs (sciatica).  · It can cause muscle spasm.  Most of the time, mechanical problems with the muscles or spine cause the pain. Mechanical problems are usually caused by an injury to " the muscles or ligaments. While illness can cause back pain, it is usually not caused by a serious illness. Mechanical problems include:   · Physical activity such as sports, exercise, work, or normal activity  · Overexertion, lifting, pushing, pulling incorrectly or too aggressively  · Sudden twisting, bending, or stretching from an accident, or accidental movement  · Poor posture  · Stretching or moving wrong, without noticing pain at the time  · Poor coordination, lack of regular exercise (check with your doctor about this)  · Spinal disc disease or arthritis  · Stress  Pain can also be related to pregnancy, or illness like appendicitis, bladder or kidney infections, pelvic infections, and many other things.  Acute back pain usually gets better in 1 to 2 weeks. Back pain related to disk disease, arthritis in the spinal joints or spinal stenosis (narrowing of the spinal canal) can become chronic and last for months or years.  Unless you had a physical injury (for example, a car accident or fall) X-rays are usually not needed for the initial evaluation of back pain. If pain continues and does not respond to medical treatment, X-rays and other tests may be needed.  Home care  Try these home care recommendations:  · When in bed, try to find a position of comfort. A firm mattress is best. Try lying flat on your back with pillows under your knees. You can also try lying on your side with your knees bent up towards your chest and a pillow between your knees.  · At first, do not try to stretch out the sore spots. If there is a strain, it is not like the good soreness you get after exercising without an injury. In this case, stretching may make it worse.  · Avoid prolong sitting, long car rides, or travel. This puts more stress on the lower back than standing or walking.  · During the first 24 to 72 hours after an acute injury or flare up of chronic back pain, apply an ice pack to the painful area for 20 minutes and then  remove it for 20 minutes. Do this over a period of 60 to 90 minutes or several times a day. This will reduce swelling and pain. Wrap the ice pack in a thin towel or plastic to protect your skin.  · You can start with ice, then switch to heat. Heat (hot shower, hot bath, or heating pad) reduces pain and works well for muscle spasms. Heat can be applied to the painful area for 20 minutes then remove it for 20 minutes. Do this over a period of 60 to 90 minutes or several times a day. Do not sleep on a heating pad. It can lead to skin burns or tissue damage.  · You can alternate ice and heat therapy. Talk with your doctor about the best treatment for your back pain.  · Therapeutic massage can help relax the back muscles without stretching them.  · Be aware of safe lifting methods and do not lift anything without stretching first.  Medicines  Talk to your doctor before using medicine, especially if you have other medical problems or are taking other medicines.  · You may use over-the-counter medicine as directed on the bottle to control pain, unless another pain medicine was prescribed. If you have chronic conditions like diabetes, liver or kidney disease, stomach ulcers, or gastrointestinal bleeding, or are taking blood thinners, talk to your doctor before taking any medicine.  · Be careful if you are given a prescription medicines, narcotics, or medicine for muscle spasms. They can cause drowsiness, affect your coordination, reflexes, and judgement. Do not drive or operate heavy machinery.  Follow-up care  Follow up with your healthcare provider, or as advised.   A radiologist will review any X-rays that were taken. Your provide will notify you of any new findings that may affect your care.  Call 911  Call emergency services if any of the following occur:  · Trouble breathing  · Confusion  · Very drowsy or trouble awakening  · Fainting or loss of consciousness  · Rapid or very slow heart rate  · Loss of bowel or  bladder control  When to seek medical advice  Call your healthcare provider right away if any of these occur:   · Pain becomes worse or spreads to your legs  · Weakness or numbness in one or both legs  · Numbness in the groin or genital area  Date Last Reviewed: 7/1/2016 © 2000-2016 [employer]. [address] [place] [address]. All rights reserved. This information is not intended as a substitute for professional medical care. Always follow your healthcare professional's instructions.           [facility] complies with applicable Federal civil rights laws and does not discriminate on the basis of race, color, national origin, age, disability, or sex.        Language Assistance Services     ATTENTION: Language assistance services are available, free of charge. Please call [phone].      ATENCIÓN: Si habla isela, tiene a moreno disposición servicios gratuitos de asistencia lingüística. Llame al [phone].     CHÚ Ý: N?u b?n nói Ti?ng Vi?t, có các d?ch v? h? tr? ngôn ng? mi?n phí dành cho b?n. G?i s? [phone].

## 2017-05-16 NOTE — ED PROVIDER NOTES
"Encounter Date: 2017       History     Chief Complaint   Patient presents with    Itching     "I got some medicines in the ER yesterday for pain and I think I am allergic to the morphine because I was itching all night long and worse today and I vomited", No welts or rash noted.      Review of patient's allergies indicates:   Allergen Reactions    Bactrim [sulfamethoxazole-trimethoprim] Itching    Tramadol     Vancomycin analogues Rash     HPI Comments: Patient was seen by me in the ER yesterday for chronic back pain exacerbation.  Patient takes OxyContin Contin 30 mg 3 times a day which is stopped by the primary care physician.  I did discuss her prescription with Dr. Rollins who is not giving her any more pain medications at this point and is advised to follow up with pain management.  Patient was given morphine sulfate yesterday and says she started having itching and nausea with vomiting.  She comes back for change in pain medication today.  I have discussed the possibilities of her management.  Patient says pain management is not taking her insurance that's why she couldn't see a physician.  Donald want to give her a few Percocets and advised her to get to pain management for further pain relief.    The history is provided by the patient.     Past Medical History:   Diagnosis Date    B12 deficiency anemia     Chronic pain     Depression     Epilepsy     Hematuria     Hypertension     Insomnia     Insomnia     Migraines      Past Surgical History:   Procedure Laterality Date    APPENDECTOMY      BELT ABDOMINOPLASTY      breast implants  2013     SECTION, LOW TRANSVERSE      x3    CHOLECYSTECTOMY      GASTRIC BYPASS      HYSTERECTOMY      REDUCTION MAMMAPLASTY      reduction and implants    TONSILLECTOMY, ADENOIDECTOMY       Family History   Problem Relation Age of Onset    Breast cancer Mother     Diabetes Mother     Hypertension Mother     Heart disease Mother     " Cancer Mother      breast    No Known Problems Sister     No Known Problems Brother     No Known Problems Daughter     No Known Problems Son     Colon cancer Neg Hx     Ovarian cancer Neg Hx      Social History   Substance Use Topics    Smoking status: Never Smoker    Smokeless tobacco: None    Alcohol use No     Review of Systems   Constitutional: Negative for activity change, appetite change and fever.   HENT: Negative for congestion, ear pain and rhinorrhea.    Eyes: Negative for pain, discharge, redness and itching.   Respiratory: Negative for cough, chest tightness, shortness of breath and wheezing.    Cardiovascular: Negative for chest pain and palpitations.   Gastrointestinal: Negative for abdominal pain, diarrhea, nausea and vomiting.   Genitourinary: Negative for dysuria and flank pain.   Musculoskeletal: Positive for back pain. Negative for gait problem and neck pain.   Skin: Negative for wound.   Neurological: Negative for dizziness, light-headedness and headaches.   Psychiatric/Behavioral: Negative for behavioral problems, confusion and decreased concentration. The patient is not hyperactive.    All other systems reviewed and are negative.      Physical Exam   Initial Vitals   BP Pulse Resp Temp SpO2   05/16/17 0957 05/16/17 0957 05/16/17 0957 05/16/17 0957 05/16/17 0957   119/75 113 22 98.9 °F (37.2 °C) 99 %     Physical Exam    Nursing note and vitals reviewed.  Constitutional: She appears well-developed and well-nourished.   HENT:   Head: Normocephalic and atraumatic.   Eyes: EOM are normal. Pupils are equal, round, and reactive to light.   Neck: Normal range of motion.   Cardiovascular: Normal rate.   Pulmonary/Chest: Breath sounds normal.   Abdominal: Soft.   Musculoskeletal: Normal range of motion.   Neurological: She is alert.   Skin: Skin is warm.         ED Course   Procedures  Labs Reviewed - No data to display                            ED Course     Clinical Impression:   The  encounter diagnosis was Chronic right-sided low back pain with right-sided sciatica.    Disposition:   Disposition: Discharged  Condition: Madhav Garner MD  05/16/17 1219

## 2017-05-16 NOTE — ED NOTES
Pt with complaint of itching to hands that began last night, pt states she was prescribed morphine here in the ed yesterday, pt took benadyrl last night.

## 2017-05-16 NOTE — DISCHARGE INSTRUCTIONS
Back Pain (Acute or Chronic)    Back pain is one of the most common problems. The good news is that most people feel better in 1 to 2 weeks, and most of the rest in 1 to 2 months. Most people can remain active.  People experience and describe pain differently; not everyone is the same.  · The pain can be sharp, stabbing, shooting, aching, cramping or burning.  · Movement, standing, bending, lifting, sitting, or walking may worsen pain.  · It can be localized to one spot or area, or it can be more generalized.  · It can spread or radiate upwards, to the front, or go down your arms or legs (sciatica).  · It can cause muscle spasm.  Most of the time, mechanical problems with the muscles or spine cause the pain. Mechanical problems are usually caused by an injury to the muscles or ligaments. While illness can cause back pain, it is usually not caused by a serious illness. Mechanical problems include:   · Physical activity such as sports, exercise, work, or normal activity  · Overexertion, lifting, pushing, pulling incorrectly or too aggressively  · Sudden twisting, bending, or stretching from an accident, or accidental movement  · Poor posture  · Stretching or moving wrong, without noticing pain at the time  · Poor coordination, lack of regular exercise (check with your doctor about this)  · Spinal disc disease or arthritis  · Stress  Pain can also be related to pregnancy, or illness like appendicitis, bladder or kidney infections, pelvic infections, and many other things.  Acute back pain usually gets better in 1 to 2 weeks. Back pain related to disk disease, arthritis in the spinal joints or spinal stenosis (narrowing of the spinal canal) can become chronic and last for months or years.  Unless you had a physical injury (for example, a car accident or fall) X-rays are usually not needed for the initial evaluation of back pain. If pain continues and does not respond to medical treatment, X-rays and other tests may be  needed.  Home care  Try these home care recommendations:  · When in bed, try to find a position of comfort. A firm mattress is best. Try lying flat on your back with pillows under your knees. You can also try lying on your side with your knees bent up towards your chest and a pillow between your knees.  · At first, do not try to stretch out the sore spots. If there is a strain, it is not like the good soreness you get after exercising without an injury. In this case, stretching may make it worse.  · Avoid prolong sitting, long car rides, or travel. This puts more stress on the lower back than standing or walking.  · During the first 24 to 72 hours after an acute injury or flare up of chronic back pain, apply an ice pack to the painful area for 20 minutes and then remove it for 20 minutes. Do this over a period of 60 to 90 minutes or several times a day. This will reduce swelling and pain. Wrap the ice pack in a thin towel or plastic to protect your skin.  · You can start with ice, then switch to heat. Heat (hot shower, hot bath, or heating pad) reduces pain and works well for muscle spasms. Heat can be applied to the painful area for 20 minutes then remove it for 20 minutes. Do this over a period of 60 to 90 minutes or several times a day. Do not sleep on a heating pad. It can lead to skin burns or tissue damage.  · You can alternate ice and heat therapy. Talk with your doctor about the best treatment for your back pain.  · Therapeutic massage can help relax the back muscles without stretching them.  · Be aware of safe lifting methods and do not lift anything without stretching first.  Medicines  Talk to your doctor before using medicine, especially if you have other medical problems or are taking other medicines.  · You may use over-the-counter medicine as directed on the bottle to control pain, unless another pain medicine was prescribed. If you have chronic conditions like diabetes, liver or kidney disease,  stomach ulcers, or gastrointestinal bleeding, or are taking blood thinners, talk to your doctor before taking any medicine.  · Be careful if you are given a prescription medicines, narcotics, or medicine for muscle spasms. They can cause drowsiness, affect your coordination, reflexes, and judgement. Do not drive or operate heavy machinery.  Follow-up care  Follow up with your healthcare provider, or as advised.   A radiologist will review any X-rays that were taken. Your provide will notify you of any new findings that may affect your care.  Call 911  Call emergency services if any of the following occur:  · Trouble breathing  · Confusion  · Very drowsy or trouble awakening  · Fainting or loss of consciousness  · Rapid or very slow heart rate  · Loss of bowel or bladder control  When to seek medical advice  Call your healthcare provider right away if any of these occur:   · Pain becomes worse or spreads to your legs  · Weakness or numbness in one or both legs  · Numbness in the groin or genital area  Date Last Reviewed: 7/1/2016  © 0610-4002 [employer] [address] [place] [address], [place] [address]. All rights reserved. This information is not intended as a substitute for professional medical care. Always follow your healthcare professional's instructions.

## 2017-05-28 ENCOUNTER — HOSPITAL ENCOUNTER (EMERGENCY)
Facility: HOSPITAL | Age: 47
Discharge: HOME | End: 2017-05-28
Attending: EMERGENCY MEDICINE
Payer: MEDICAID

## 2017-05-28 VITALS
SYSTOLIC BLOOD PRESSURE: 132 MMHG | BODY MASS INDEX: 22.08 KG/M2 | WEIGHT: 120 LBS | DIASTOLIC BLOOD PRESSURE: 72 MMHG | OXYGEN SATURATION: 99 % | RESPIRATION RATE: 18 BRPM | HEIGHT: 62 IN | HEART RATE: 86 BPM | TEMPERATURE: 98 F

## 2017-05-28 DIAGNOSIS — M54.41 ACUTE BACK PAIN WITH SCIATICA, RIGHT: Primary | ICD-10-CM

## 2017-05-28 PROCEDURE — 99283 EMERGENCY DEPT VISIT LOW MDM: CPT

## 2017-05-28 PROCEDURE — 25000003 PHARM REV CODE 250: Performed by: EMERGENCY MEDICINE

## 2017-05-28 PROCEDURE — 63600175 PHARM REV CODE 636 W HCPCS: Performed by: EMERGENCY MEDICINE

## 2017-05-28 RX ORDER — CYCLOBENZAPRINE HCL 10 MG
10 TABLET ORAL
Status: COMPLETED | OUTPATIENT
Start: 2017-05-28 | End: 2017-05-28

## 2017-05-28 RX ORDER — HYDROCODONE BITARTRATE AND ACETAMINOPHEN 10; 325 MG/1; MG/1
1 TABLET ORAL
Status: COMPLETED | OUTPATIENT
Start: 2017-05-28 | End: 2017-05-28

## 2017-05-28 RX ORDER — PREDNISONE 10 MG/1
10 TABLET ORAL DAILY
Qty: 21 TABLET | Refills: 0 | Status: SHIPPED | OUTPATIENT
Start: 2017-05-28 | End: 2017-06-07

## 2017-05-28 RX ORDER — HYDROCODONE BITARTRATE AND ACETAMINOPHEN 10; 325 MG/1; MG/1
1 TABLET ORAL EVERY 6 HOURS PRN
Qty: 12 TABLET | Refills: 0 | Status: SHIPPED | OUTPATIENT
Start: 2017-05-28 | End: 2017-06-26

## 2017-05-28 RX ORDER — CYCLOBENZAPRINE HCL 10 MG
10 TABLET ORAL 3 TIMES DAILY PRN
Qty: 20 TABLET | Refills: 0 | Status: SHIPPED | OUTPATIENT
Start: 2017-05-28 | End: 2017-06-02

## 2017-05-28 RX ORDER — PREDNISONE 20 MG/1
60 TABLET ORAL
Status: COMPLETED | OUTPATIENT
Start: 2017-05-28 | End: 2017-05-28

## 2017-05-28 RX ADMIN — HYDROCODONE BITARTRATE AND ACETAMINOPHEN 1 TABLET: 10; 325 TABLET ORAL at 02:05

## 2017-05-28 RX ADMIN — CYCLOBENZAPRINE HYDROCHLORIDE 10 MG: 10 TABLET, FILM COATED ORAL at 02:05

## 2017-05-28 RX ADMIN — PREDNISONE 60 MG: 20 TABLET ORAL at 02:05

## 2017-05-28 NOTE — ED PROVIDER NOTES
Encounter Date: 2017       History     Chief Complaint   Patient presents with    Back Pain     Chronis back pain mid to lower that goes down my leg     Review of patient's allergies indicates:   Allergen Reactions    Bactrim [sulfamethoxazole-trimethoprim] Itching    Tramadol     Vancomycin analogues Rash     Well-developed 47-year-old female presents with back pain which started since yesterday.  Patient with history of sciatia.  Was washing her dog and inflamed  Her nerve.  No other injuries.  Stable otherwise. Negative dysuria.  Negative const          Past Medical History:   Diagnosis Date    B12 deficiency anemia     Chronic pain     Depression     Epilepsy     Hematuria     Hypertension     Insomnia     Insomnia     Migraines      Past Surgical History:   Procedure Laterality Date    APPENDECTOMY      BELT ABDOMINOPLASTY      breast implants  2013     SECTION, LOW TRANSVERSE      x3    CHOLECYSTECTOMY      GASTRIC BYPASS      HYSTERECTOMY      REDUCTION MAMMAPLASTY      reduction and implants    TONSILLECTOMY, ADENOIDECTOMY       Family History   Problem Relation Age of Onset    Breast cancer Mother     Diabetes Mother     Hypertension Mother     Heart disease Mother     Cancer Mother      breast    No Known Problems Sister     No Known Problems Brother     No Known Problems Daughter     No Known Problems Son     Colon cancer Neg Hx     Ovarian cancer Neg Hx      Social History   Substance Use Topics    Smoking status: Never Smoker    Smokeless tobacco: Not on file    Alcohol use No     Review of Systems   Musculoskeletal: Positive for back pain ( history of sciatica.  Inflamed  yesterday.).   All other systems reviewed and are negative.      Physical Exam     Initial Vitals [17 1349]   BP Pulse Resp Temp SpO2   (!) 139/91 102 20 98.2 °F (36.8 °C) 98 %     Physical Exam    Vitals reviewed.  Constitutional: She appears well-developed and  well-nourished.   HENT:   Head: Normocephalic and atraumatic.   Eyes: Pupils are equal, round, and reactive to light.   Neck: Normal range of motion.   Cardiovascular: Normal rate, regular rhythm and normal heart sounds.   Pulmonary/Chest: Breath sounds normal.   Abdominal: Soft.   Neurological: She is alert and oriented to person, place, and time. She has normal strength.   Skin: Skin is warm and dry.   Psychiatric: She has a normal mood and affect. Her behavior is normal. Judgment and thought content normal.         ED Course   Procedures  Labs Reviewed - No data to display          Medical Decision Making:   Differential Diagnosis:   Kidney stone, muscle strain, epidural hematoma, epidural abscess, abscess superficial, fracture, facet dislocation, sciatica, radiculopathy, shingles, aortic aneurysm, renal artery aneurysm, contusion, trauma.                     ED Course     Clinical Impression:   The encounter diagnosis was Acute back pain with sciatica, right.    Disposition:   Disposition: Discharged  Condition: Fair       Jalen Meade MD  05/28/17 1416

## 2017-06-05 ENCOUNTER — HOSPITAL ENCOUNTER (EMERGENCY)
Facility: HOSPITAL | Age: 47
Discharge: HOME | End: 2017-06-05
Payer: MEDICAID

## 2017-06-05 VITALS
DIASTOLIC BLOOD PRESSURE: 90 MMHG | WEIGHT: 140 LBS | RESPIRATION RATE: 20 BRPM | HEART RATE: 92 BPM | SYSTOLIC BLOOD PRESSURE: 140 MMHG | OXYGEN SATURATION: 99 % | BODY MASS INDEX: 23.32 KG/M2 | HEIGHT: 65 IN | TEMPERATURE: 98 F

## 2017-06-05 DIAGNOSIS — M54.50 CHRONIC LOW BACK PAIN WITHOUT SCIATICA, UNSPECIFIED BACK PAIN LATERALITY: Primary | ICD-10-CM

## 2017-06-05 DIAGNOSIS — G89.29 CHRONIC LOW BACK PAIN WITHOUT SCIATICA, UNSPECIFIED BACK PAIN LATERALITY: Primary | ICD-10-CM

## 2017-06-05 PROCEDURE — 99283 EMERGENCY DEPT VISIT LOW MDM: CPT

## 2017-06-05 RX ORDER — OXYCODONE AND ACETAMINOPHEN 10; 325 MG/1; MG/1
1 TABLET ORAL EVERY 4 HOURS PRN
Qty: 10 TABLET | Refills: 0 | Status: SHIPPED | OUTPATIENT
Start: 2017-06-05 | End: 2017-06-05

## 2017-06-05 RX ORDER — MELOXICAM 15 MG/1
15 TABLET ORAL DAILY
Qty: 10 TABLET | Refills: 0 | Status: SHIPPED | OUTPATIENT
Start: 2017-06-05 | End: 2017-06-26

## 2017-06-05 RX ORDER — OXYCODONE AND ACETAMINOPHEN 10; 325 MG/1; MG/1
1 TABLET ORAL EVERY 4 HOURS PRN
Qty: 7 TABLET | Refills: 0 | Status: SHIPPED | OUTPATIENT
Start: 2017-06-05 | End: 2017-06-26

## 2017-06-05 NOTE — ED PROVIDER NOTES
Encounter Date: 2017       History     Chief Complaint   Patient presents with    Back Pain     Pt reports chronic back pain that has been going on for a while. Pt states she is here for pain medication because we only gave her 3 days and she cannot see her doctor until 17     Review of patient's allergies indicates:   Allergen Reactions    Bactrim [sulfamethoxazole-trimethoprim] Itching    Tramadol     Vancomycin analogues Rash     46 yo F presents to the ED for chronic back pain. States she has a known healing fracture to the tailbone that is worse with walking and sitting. Denies loss of bowel or bladder control. Denies any new injury denies dysuria.          Past Medical History:   Diagnosis Date    B12 deficiency anemia     Chronic pain     Depression     Epilepsy     Hematuria     Hypertension     Insomnia     Insomnia     Migraines      Past Surgical History:   Procedure Laterality Date    APPENDECTOMY      BELT ABDOMINOPLASTY      breast implants  2013     SECTION, LOW TRANSVERSE      x3    CHOLECYSTECTOMY      GASTRIC BYPASS      HYSTERECTOMY      REDUCTION MAMMAPLASTY      reduction and implants    TONSILLECTOMY, ADENOIDECTOMY       Family History   Problem Relation Age of Onset    Breast cancer Mother     Diabetes Mother     Hypertension Mother     Heart disease Mother     Cancer Mother      breast    No Known Problems Sister     No Known Problems Brother     No Known Problems Daughter     No Known Problems Son     Colon cancer Neg Hx     Ovarian cancer Neg Hx      Social History   Substance Use Topics    Smoking status: Never Smoker    Smokeless tobacco: Not on file    Alcohol use No     Review of Systems   Constitutional: Negative for activity change, appetite change and fever.   HENT: Negative for sore throat.    Respiratory: Negative for shortness of breath.    Cardiovascular: Negative for chest pain.   Gastrointestinal: Negative for abdominal  pain and nausea.   Genitourinary: Negative for difficulty urinating, dysuria, frequency, pelvic pain and urgency.   Musculoskeletal: Positive for back pain. Negative for gait problem, neck pain and neck stiffness.   Skin: Negative for rash.   Neurological: Negative for dizziness, weakness, light-headedness, numbness and headaches.   Hematological: Does not bruise/bleed easily.   All other systems reviewed and are negative.      Physical Exam     Initial Vitals   BP Pulse Resp Temp SpO2   -- -- -- -- --     Physical Exam    Nursing note and vitals reviewed.  Constitutional: She appears well-developed and well-nourished. No distress.   HENT:   Head: Normocephalic.   Eyes: Conjunctivae are normal.   Neck: Normal range of motion. Neck supple.   Cardiovascular: Normal rate.   Pulmonary/Chest: Breath sounds normal. No respiratory distress.   Abdominal: Soft. Bowel sounds are normal. She exhibits no distension and no abdominal bruit. There is no tenderness. There is no rebound and no guarding. No hernia.   Neurological: She is alert and oriented to person, place, and time.   Skin: Skin is warm and dry. Capillary refill takes less than 2 seconds.   Psychiatric: She has a normal mood and affect. Her behavior is normal. Judgment and thought content normal.         ED Course   Procedures  Labs Reviewed - No data to display          Medical Decision Making:   Initial Assessment:   46 yo F presents to the ED for chronic back pain. States she has a known healing fracture to the tailbone that is worse with walking and sitting. Denies loss of bowel or bladder control. Denies any new injury denies dysuria.  Differential Diagnosis:   Chronic pain, drug seeking behavior, cauda equina, fracture, disc injury, dislocation  ED Management:  Discussed multiple ED visits with patient for same complaint. Informed patient that this is a chronic pain and I am unable to continue to write prescriptions for narcotic pain medication. Patient has a  follow up appointment scheduled fo 6/8/17 with PCP. Instructed to keep appointment. We will also give anti-inflammatory medication and follow up information for pain management clinic. Patient encouraged to purchase a donut pillow to sit on for tailbone comfort. Instructed to return if numbness, loss of bladder or bowel control develops. Will discharge. Patient verbalized understanding.                   ED Course     Clinical Impression:   The encounter diagnosis was Chronic low back pain without sciatica, unspecified back pain laterality.          Santa Wilson NP  06/05/17 2839

## 2017-06-05 NOTE — ED TRIAGE NOTES
Pt reports chronic back pain that has been going on for a while. Pt states she is here for pain medication because we only gave her 3 days and she cannot see her doctor until 6-8-17

## 2017-06-05 NOTE — ED NOTES
Pt requesting to speak to physician at this time because she is not satisfied with medication NP will be giving her and she needs pain medication

## 2017-06-08 ENCOUNTER — TELEPHONE (OUTPATIENT)
Dept: FAMILY MEDICINE | Facility: CLINIC | Age: 47
End: 2017-06-08

## 2017-06-08 RX ORDER — OXYCODONE HYDROCHLORIDE 30 MG/1
30 TABLET ORAL 4 TIMES DAILY
Qty: 100 TABLET | Refills: 0 | Status: SHIPPED | OUTPATIENT
Start: 2017-06-08 | End: 2018-06-15

## 2017-06-20 ENCOUNTER — TELEPHONE (OUTPATIENT)
Dept: FAMILY MEDICINE | Facility: CLINIC | Age: 47
End: 2017-06-20

## 2017-06-20 DIAGNOSIS — G40.909 SEIZURE DISORDER: Primary | ICD-10-CM

## 2017-06-20 NOTE — TELEPHONE ENCOUNTER
----- Message from Estefany Pollock sent at 6/20/2017  1:46 PM CDT -----  Contact: Pt [phone]  Patient requesting a referral to see doctor for Epilepsy    Dr. Edwin Quick    Neurosurgery  [facility] [address] [address]

## 2017-06-21 NOTE — TELEPHONE ENCOUNTER
----- Message from Nydia Young sent at 6/21/2017  2:20 PM CDT -----  Patient called with fax # [phone] to send referral to

## 2017-06-26 ENCOUNTER — OFFICE VISIT (OUTPATIENT)
Dept: FAMILY MEDICINE | Facility: CLINIC | Age: 47
End: 2017-06-26
Payer: MEDICAID

## 2017-06-26 VITALS
OXYGEN SATURATION: 99 % | SYSTOLIC BLOOD PRESSURE: 118 MMHG | WEIGHT: 131.38 LBS | DIASTOLIC BLOOD PRESSURE: 76 MMHG | HEIGHT: 65 IN | BODY MASS INDEX: 21.89 KG/M2 | TEMPERATURE: 99 F | HEART RATE: 100 BPM

## 2017-06-26 DIAGNOSIS — K57.32 DIVERTICULITIS OF LARGE INTESTINE WITHOUT PERFORATION OR ABSCESS WITHOUT BLEEDING: ICD-10-CM

## 2017-06-26 PROCEDURE — 99213 OFFICE O/P EST LOW 20 MIN: CPT | Mod: S$GLB,,, | Performed by: FAMILY MEDICINE

## 2017-06-26 RX ORDER — DICYCLOMINE HYDROCHLORIDE 10 MG/1
10 CAPSULE ORAL 3 TIMES DAILY
Qty: 90 CAPSULE | Refills: 0 | Status: SHIPPED | OUTPATIENT
Start: 2017-06-26 | End: 2017-07-26

## 2017-06-26 RX ORDER — METRONIDAZOLE 500 MG/1
500 TABLET ORAL 3 TIMES DAILY
Qty: 21 TABLET | Refills: 0 | Status: SHIPPED | OUTPATIENT
Start: 2017-06-26 | End: 2017-08-22 | Stop reason: SDUPTHER

## 2017-06-26 NOTE — PROGRESS NOTES
Subjective:      Patient ID: Ayana Anthony is a 47 y.o. female.    Chief Complaint: Abdominal Pain    abd pain since Friday; LLL; multiple surgeries in past; no fever; BM like foam; no blood; not vomiting; never had colonoscopy; feels bloated      Abdominal Pain       Review of Systems   Gastrointestinal: Positive for abdominal pain.   All other systems reviewed and are negative.    Objective:     Physical Exam   Constitutional: She is oriented to person, place, and time. She appears well-developed and well-nourished.   HENT:   Head: Normocephalic.   Eyes: Conjunctivae and EOM are normal. Pupils are equal, round, and reactive to light.   Neck: Normal range of motion. Neck supple.   Cardiovascular: Normal rate, regular rhythm and normal heart sounds.    Pulmonary/Chest: Effort normal and breath sounds normal.   Musculoskeletal: Normal range of motion.   Neurological: She is alert and oriented to person, place, and time. She has normal reflexes.   Skin: Skin is warm and dry.   Psychiatric: She has a normal mood and affect. Her behavior is normal. Judgment and thought content normal.   Nursing note and vitals reviewed.    Assessment:     1. Diverticulitis of large intestine without perforation or abscess without bleeding      Plan:     New Prescriptions    DICYCLOMINE (BENTYL) 10 MG CAPSULE    Take 1 capsule (10 mg total) by mouth 3 (three) times daily. Prn pain    METRONIDAZOLE (FLAGYL) 500 MG TABLET    Take 1 tablet (500 mg total) by mouth 3 (three) times daily.     Discontinued Medications    FLUTICASONE (FLONASE) 50 MCG/ACTUATION NASAL SPRAY    TWO SPRAYS IN EACH NOSTRIL ONCE DAILY    HYDROCODONE-ACETAMINOPHEN 10-325MG (NORCO)  MG TAB    Take 1 tablet by mouth every 6 (six) hours as needed for Pain.    MELOXICAM (MOBIC) 15 MG TABLET    Take 1 tablet (15 mg total) by mouth once daily.    MORPHINE (MSIR) 15 MG TABLET    Take 1 tablet (15 mg total) by mouth every 4 (four) hours as needed for Pain.    ONDANSETRON  (ZOFRAN-ODT) 4 MG TBDL    Take 1 tablet (4 mg total) by mouth every 8 (eight) hours as needed.    OXYCODONE-ACETAMINOPHEN (PERCOCET)  MG PER TABLET    Take 1 tablet by mouth every 12 (twelve) hours as needed for Pain.    OXYCODONE-ACETAMINOPHEN (PERCOCET)  MG PER TABLET    Take 1 tablet by mouth every 4 (four) hours as needed for Pain.     Modified Medications    No medications on file       Diverticulitis of large intestine without perforation or abscess without bleeding    Other orders  -     metronidazole (FLAGYL) 500 MG tablet; Take 1 tablet (500 mg total) by mouth 3 (three) times daily.  Dispense: 21 tablet; Refill: 0  -     dicyclomine (BENTYL) 10 MG capsule; Take 1 capsule (10 mg total) by mouth 3 (three) times daily. Prn pain  Dispense: 90 capsule; Refill: 0

## 2017-07-18 ENCOUNTER — TELEPHONE (OUTPATIENT)
Dept: FAMILY MEDICINE | Facility: CLINIC | Age: 47
End: 2017-07-18

## 2017-07-18 DIAGNOSIS — G89.4 CHRONIC PAIN SYNDROME: Primary | ICD-10-CM

## 2017-07-18 NOTE — TELEPHONE ENCOUNTER
----- Message from Estefany Pollock sent at 7/7/2017 11:55 AM CDT -----  Contact: Pt walked in office requesting pain med refill  Pt phone #[phone]     Patient would like a call. Patient states he cant get in to see new pain management doctor until 07/11/2017. Patient requesting a refill on pain medication until then.   Patient's wife also called a few minutes before patient came in to office requesting new referrals for both of them office notes & MRI reports sent to [facility] (Dr. Rakesh Horn).   Wife (Ayana Anthony 1970) advised office not to send any office notes that states pass issues with pain prescriptions. Please advise      Dr. Rakesh Horn  Emergency medicine  [address] [place] [id number] [phone]        Mr anthony advised that his wife was able to see the pain management doctor yesterday and received her pain meds

## 2017-07-25 RX ORDER — ESTROGENS, CONJUGATED 1.25 MG
TABLET ORAL
Qty: 90 TABLET | Refills: 3 | Status: SHIPPED | OUTPATIENT
Start: 2017-07-25 | End: 2018-02-06

## 2017-07-31 NOTE — TELEPHONE ENCOUNTER
Please send to [facility] pharmacy     diazePAM (VALIUM) 10 MG Tab  Take 1 tablet (10 mg total) by mouth nightly as needed.   Normal, Disp-30 tablet, R-5

## 2017-08-01 RX ORDER — DIAZEPAM 10 MG/1
10 TABLET ORAL NIGHTLY PRN
Qty: 30 TABLET | Refills: 5 | OUTPATIENT
Start: 2017-08-01

## 2017-08-02 ENCOUNTER — TELEPHONE (OUTPATIENT)
Dept: FAMILY MEDICINE | Facility: CLINIC | Age: 47
End: 2017-08-02

## 2017-08-02 RX ORDER — DIAZEPAM 10 MG/1
10 TABLET ORAL NIGHTLY PRN
Qty: 30 TABLET | Refills: 2 | Status: SHIPPED | OUTPATIENT
Start: 2017-08-02 | End: 2017-09-12 | Stop reason: SDUPTHER

## 2017-08-02 NOTE — TELEPHONE ENCOUNTER
----- Message from Estefany Pollock sent at 8/2/2017  9:32 AM CDT -----  Contact: Pt [phone]  Patient requesting a call to discuss why prescription request for Valium was denied. Please advise

## 2017-08-07 NOTE — TELEPHONE ENCOUNTER
Please send to [facility] in [place].     oxcarbazepine (TRILEPTAL) 600 MG Tab  Take 2 tablets (1,200 mg total) by mouth 2 (two) times daily. 1 1/2 am 1 1/2 pm   Normal

## 2017-08-11 ENCOUNTER — TELEPHONE (OUTPATIENT)
Dept: FAMILY MEDICINE | Facility: CLINIC | Age: 47
End: 2017-08-11

## 2017-08-15 NOTE — TELEPHONE ENCOUNTER
Pharmacy Refill Request      Medication Name: Trileptal    Strength: 600 MG    Direction: Take 1 1/2 am & 1 1/2 pm    Quantity: 120 monthly / Requesting 90 day supply 360    Pharmacy Name: [facility] [place]    Telephone number for patient to be reached: [phone]

## 2017-08-21 NOTE — TELEPHONE ENCOUNTER
----- Message from Estefany Pollock sent at 8/21/2017 11:37 AM CDT -----  Contact: Pt [phone]  Patient states her diverticulitis has flared up again. Patient requesting a prescription sent to [facility] in [place]

## 2017-08-21 NOTE — TELEPHONE ENCOUNTER
----- Message from Nydia Young sent at 8/21/2017  8:47 AM CDT -----  Please call with blood work results. Done @ [facility] a week ago

## 2017-08-22 RX ORDER — METRONIDAZOLE 500 MG/1
500 TABLET ORAL 3 TIMES DAILY
Qty: 21 TABLET | Refills: 0 | Status: SHIPPED | OUTPATIENT
Start: 2017-08-22 | End: 2017-09-25 | Stop reason: ALTCHOICE

## 2017-08-22 RX ORDER — SERTRALINE HYDROCHLORIDE 100 MG/1
200 TABLET, FILM COATED ORAL DAILY
Qty: 60 TABLET | Refills: 5 | Status: SHIPPED | OUTPATIENT
Start: 2017-08-22 | End: 2017-09-12 | Stop reason: SDUPTHER

## 2017-08-22 NOTE — TELEPHONE ENCOUNTER
1. Patient requesting prescription sent to [facility] [place].       sertraline (ZOLOFT) 100 MG tablet  Take 2 tablets (200 mg total) by mouth once daily.   Normal, Disp-60 tablet, R-5        2. Patient also requesting Dr. Rollins look over blood work results done at [facility] (Please see media)

## 2017-08-23 RX ORDER — DICYCLOMINE HYDROCHLORIDE 10 MG/1
10 CAPSULE ORAL 3 TIMES DAILY
Qty: 90 CAPSULE | Refills: 0 | Status: SHIPPED | OUTPATIENT
Start: 2017-08-23 | End: 2017-09-22

## 2017-08-23 NOTE — TELEPHONE ENCOUNTER
Pt notified that refills were sent.     She is requesting that her meds for depression be increased.  All she does is cries.      Also, she is requesting that you review her labs in media.

## 2017-08-23 NOTE — TELEPHONE ENCOUNTER
Please send to [facility]. Patient advised Philly already sent to pharmacy. Patient requesting prescription for Dicyclomine to treat diverticulitis pain.      dicyclomine (BENTYL) 10 MG capsule  Take 1 capsule (10 mg total) by mouth 3 (three) times daily.   Normal, Disp-90 capsule, R-0

## 2017-08-28 RX ORDER — OXCARBAZEPINE 300 MG/1
900 TABLET, FILM COATED ORAL 2 TIMES DAILY
Qty: 180 TABLET | Refills: 5 | Status: SHIPPED | OUTPATIENT
Start: 2017-08-28 | End: 2017-09-12

## 2017-08-28 RX ORDER — OXCARBAZEPINE 600 MG/1
900 TABLET, FILM COATED ORAL 2 TIMES DAILY
Qty: 360 TABLET | Refills: 0 | OUTPATIENT
Start: 2017-08-28

## 2017-09-12 ENCOUNTER — OFFICE VISIT (OUTPATIENT)
Dept: FAMILY MEDICINE | Facility: CLINIC | Age: 47
End: 2017-09-12
Payer: MEDICAID

## 2017-09-12 VITALS
WEIGHT: 127 LBS | HEIGHT: 62 IN | TEMPERATURE: 99 F | SYSTOLIC BLOOD PRESSURE: 112 MMHG | BODY MASS INDEX: 23.37 KG/M2 | OXYGEN SATURATION: 100 % | DIASTOLIC BLOOD PRESSURE: 74 MMHG | HEART RATE: 96 BPM

## 2017-09-12 DIAGNOSIS — E55.9 VITAMIN D DEFICIENCY: ICD-10-CM

## 2017-09-12 DIAGNOSIS — E53.1 VITAMIN B6 DEFICIENCY: ICD-10-CM

## 2017-09-12 DIAGNOSIS — D75.839 THROMBOCYTOSIS: Primary | ICD-10-CM

## 2017-09-12 DIAGNOSIS — F32.A DEPRESSION, UNSPECIFIED DEPRESSION TYPE: Chronic | ICD-10-CM

## 2017-09-12 DIAGNOSIS — F43.10 PTSD (POST-TRAUMATIC STRESS DISORDER): ICD-10-CM

## 2017-09-12 DIAGNOSIS — E23.7 PITUITARY DYSFUNCTION: ICD-10-CM

## 2017-09-12 PROCEDURE — 99213 OFFICE O/P EST LOW 20 MIN: CPT | Mod: S$GLB,,, | Performed by: FAMILY MEDICINE

## 2017-09-12 PROCEDURE — 3074F SYST BP LT 130 MM HG: CPT | Mod: S$GLB,,, | Performed by: FAMILY MEDICINE

## 2017-09-12 PROCEDURE — 3078F DIAST BP <80 MM HG: CPT | Mod: S$GLB,,, | Performed by: FAMILY MEDICINE

## 2017-09-12 PROCEDURE — 3008F BODY MASS INDEX DOCD: CPT | Mod: S$GLB,,, | Performed by: FAMILY MEDICINE

## 2017-09-12 RX ORDER — SERTRALINE HYDROCHLORIDE 100 MG/1
200 TABLET, FILM COATED ORAL DAILY
Qty: 60 TABLET | Refills: 5 | Status: SHIPPED | OUTPATIENT
Start: 2017-09-12 | End: 2018-04-13

## 2017-09-12 RX ORDER — ACETAMINOPHEN 500 MG
5000 TABLET ORAL DAILY
Qty: 90 TABLET | Refills: 3 | COMMUNITY
Start: 2017-09-12 | End: 2018-04-13

## 2017-09-12 RX ORDER — OXCARBAZEPINE 600 MG/1
TABLET, FILM COATED ORAL
Refills: 0 | COMMUNITY
Start: 2017-08-15

## 2017-09-12 RX ORDER — PYRIDOXINE HCL (VITAMIN B6) 250 MG
250 TABLET ORAL DAILY
Qty: 100 TABLET | Refills: 11 | COMMUNITY
Start: 2017-09-12 | End: 2018-04-13

## 2017-09-12 RX ORDER — DIAZEPAM 10 MG/1
TABLET ORAL
Qty: 45 TABLET | Refills: 2 | Status: SHIPPED | OUTPATIENT
Start: 2017-09-12 | End: 2017-12-20 | Stop reason: SDUPTHER
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
## 2017-09-14 ENCOUNTER — TELEPHONE (OUTPATIENT)
Dept: ENDOCRINOLOGY | Facility: CLINIC | Age: 47
End: 2017-09-14

## 2017-09-14 NOTE — TELEPHONE ENCOUNTER
----- Message from Fabiana Garcia sent at 9/14/2017  2:55 PM CDT -----  Contact: pt   Pt would like to be called back regarding scheduling an appt and speaking with nurse. Pt is being referred by Elieser Rollins MD     Pt can be reached at [phone].

## 2017-09-14 NOTE — TELEPHONE ENCOUNTER
Spoke with patient in regards to scheduling an appointment. Patient was informed that we do not accept Medicaid Insurance at this time and was given contacts for [facility], [facility] for an appointment.

## 2017-09-18 PROBLEM — F43.10 PTSD (POST-TRAUMATIC STRESS DISORDER): Status: ACTIVE | Noted: 2017-09-18

## 2017-09-18 PROBLEM — D75.839 THROMBOCYTOSIS: Status: ACTIVE | Noted: 2017-09-18

## 2017-09-18 RX ORDER — ESTROGENS, CONJUGATED 1.25 MG
TABLET ORAL
Qty: 30 TABLET | Refills: 4 | Status: SHIPPED | OUTPATIENT
Start: 2017-09-18 | End: 2017-09-25 | Stop reason: SDUPTHER

## 2017-09-25 ENCOUNTER — OFFICE VISIT (OUTPATIENT)
Dept: OBSTETRICS AND GYNECOLOGY | Facility: CLINIC | Age: 47
End: 2017-09-25
Payer: MEDICAID

## 2017-09-25 VITALS
BODY MASS INDEX: 22.15 KG/M2 | DIASTOLIC BLOOD PRESSURE: 66 MMHG | WEIGHT: 125 LBS | SYSTOLIC BLOOD PRESSURE: 122 MMHG | HEIGHT: 63 IN

## 2017-09-25 DIAGNOSIS — Z01.419 WELL WOMAN EXAM WITH ROUTINE GYNECOLOGICAL EXAM: Primary | ICD-10-CM

## 2017-09-25 DIAGNOSIS — B96.89 BV (BACTERIAL VAGINOSIS): ICD-10-CM

## 2017-09-25 DIAGNOSIS — N76.0 BV (BACTERIAL VAGINOSIS): ICD-10-CM

## 2017-09-25 DIAGNOSIS — N89.8 VAGINAL DISCHARGE: ICD-10-CM

## 2017-09-25 PROCEDURE — 99999 PR PBB SHADOW E&M-EST. PATIENT-LVL III: CPT | Mod: PBBFAC,,, | Performed by: OBSTETRICS & GYNECOLOGY

## 2017-09-25 PROCEDURE — 87660 TRICHOMONAS VAGIN DIR PROBE: CPT

## 2017-09-25 PROCEDURE — 99386 PREV VISIT NEW AGE 40-64: CPT | Mod: S$PBB,,, | Performed by: OBSTETRICS & GYNECOLOGY

## 2017-09-25 PROCEDURE — 87591 N.GONORRHOEAE DNA AMP PROB: CPT

## 2017-09-25 PROCEDURE — 87480 CANDIDA DNA DIR PROBE: CPT

## 2017-09-25 PROCEDURE — 99213 OFFICE O/P EST LOW 20 MIN: CPT | Mod: PBBFAC,PN | Performed by: OBSTETRICS & GYNECOLOGY

## 2017-09-25 RX ORDER — METRONIDAZOLE 500 MG/1
500 TABLET ORAL 2 TIMES DAILY
Qty: 14 TABLET | Refills: 0 | Status: SHIPPED | OUTPATIENT
Start: 2017-09-25 | End: 2017-10-02

## 2017-09-25 RX ORDER — DIVALPROEX SODIUM 500 MG/1
500 TABLET, FILM COATED, EXTENDED RELEASE ORAL NIGHTLY
Refills: 3 | COMMUNITY
Start: 2017-08-25 | End: 2022-09-09

## 2017-09-25 RX ORDER — RIZATRIPTAN BENZOATE 10 MG/1
TABLET ORAL
Refills: 99 | COMMUNITY
Start: 2017-09-18 | End: 2022-09-09

## 2017-09-25 NOTE — PROGRESS NOTES
CC: Annual check-up    SUBJECTIVE:   47 y.o. female   for annual routine Pap and checkup. No LMP recorded. Patient has had a hysterectomy..  She complains of vaginal discharge.        Past Medical History:   Diagnosis Date    B12 deficiency anemia     Chronic pain     Depression     Epilepsy     Hematuria     Hypertension     Insomnia     Insomnia     Migraines      Past Surgical History:   Procedure Laterality Date    APPENDECTOMY      BELT ABDOMINOPLASTY      breast implants  2013     SECTION, LOW TRANSVERSE      x3    CHOLECYSTECTOMY      GASTRIC BYPASS      HYSTERECTOMY      REDUCTION MAMMAPLASTY      reduction and implants    TONSILLECTOMY, ADENOIDECTOMY       Social History     Social History    Marital status:      Spouse name: N/A    Number of children: N/A    Years of education: N/A     Occupational History    Not on file.     Social History Main Topics    Smoking status: Never Smoker    Smokeless tobacco: Never Used    Alcohol use No    Drug use: No    Sexual activity: Yes     Partners: Male     Other Topics Concern    Not on file     Social History Narrative    No narrative on file     Family History   Problem Relation Age of Onset    Breast cancer Mother     Diabetes Mother     Hypertension Mother     Heart disease Mother     Cancer Mother      breast    Kidney disease Father     No Known Problems Sister     No Known Problems Brother     No Known Problems Daughter     No Known Problems Son     Colon cancer Neg Hx     Ovarian cancer Neg Hx      OB History    Para Term  AB Living   3 2     1 2   SAB TAB Ectopic Multiple Live Births       1          # Outcome Date GA Lbr Eddie/2nd Weight Sex Delivery Anes PTL Lv   3 Para      CS-LVertical      2 Para      CS-LVertical      1 Ectopic                     Current Outpatient Prescriptions   Medication Sig Dispense Refill    albuterol (PROAIR HFA) 90 mcg/actuation inhaler Inhale 2  puffs into the lungs every 4 (four) hours as needed for Wheezing or Shortness of Breath. 18 g 3    amlodipine (NORVASC) 10 MG tablet Take 1 tablet (10 mg total) by mouth once daily. 90 tablet 3    cholecalciferol, vitamin D3, (VITAMIN D3) 5,000 unit Tab Take 5,000 Units by mouth once daily. 90 tablet 3    cyanocobalamin 1,000 mcg/mL injection Inject 1 mL (1,000 mcg total) into the skin every 28 days. 10 mL 2    diazePAM (VALIUM) 10 MG Tab Half in AM, one in PM 45 tablet 2    diclofenac sodium (VOLTAREN) 1 % Gel Apply 3 times daily prn 100 g 5    divalproex ER (DEPAKOTE) 500 MG Tb24 Take 500 mg by mouth every evening.  3    folic acid (FOLVITE) 1 MG tablet TAKE ONE Tablet BY MOUTH EVERY DAY THANK YOU 90 tablet 3    hydrochlorothiazide (HYDRODIURIL) 25 MG tablet Take 1 tablet (25 mg total) by mouth once daily. 90 tablet 3    oxcarbazepine (TRILEPTAL) 600 MG Tab TAKE 1 & 1/2 TABLETS BY MOUTH TWICE DAILY  0    oxycodone (ROXICODONE) 30 MG Tab Take 1 tablet (30 mg total) by mouth 4 (four) times daily. Take 1 tablet by mouth 5 times daily as needed 100 tablet 0    potassium chloride (MICRO-K) 10 MEQ CpSR Take 2 capsules (20 mEq total) by mouth 3 (three) times daily. 540 capsule 3    PREMARIN 1.25 mg tablet Take 1 tablet (1.25 mg total) by mouth once daily. 90 tablet 3    rizatriptan (MAXALT) 10 MG tablet TAKE 1 TABLET BY MOUTH EVERY DAY AS NEEDED FOR HEADACHE, MAY REPEAT 1 DOSE AFTER 2 HOURS AS NEEDED , DO NOT EXCEED 2 PER DAY  99    sertraline (ZOLOFT) 100 MG tablet Take 2 tablets (200 mg total) by mouth once daily. 60 tablet 5    trazodone (DESYREL) 150 MG tablet Take 1 tablet (150 mg total) by mouth nightly as needed for Insomnia. 90 tablet 3    metronidazole (FLAGYL) 500 MG tablet Take 1 tablet (500 mg total) by mouth 2 (two) times daily. 14 tablet 0    pyridoxine, vitamin B6, (B-6) 250 MG Tab Take 1 tablet (250 mg total) by mouth once daily. 100 tablet 11     No current facility-administered  "medications for this visit.      Allergies: Bactrim [sulfamethoxazole-trimethoprim]; Morphine; Tramadol; and Vancomycin analogues     ROS:  Constitutional: no weight loss, weight gain, fever, fatigue  Eyes:  No vision changes, glasses/contacts  ENT/Mouth: No ulcers, sinus problems, ears ringing, headache  Cardiovascular: No inability to lie flat, chest pain, exercise intolerance, swelling, heart palpitations  Respiratory: No wheezing, coughing blood, shortness of breath, or cough  Gastrointestinal: No diarrhea, bloody stool, nausea/vomiting, constipation, gas, hemorrhoids  Genitourinary: No blood in urine, painful urination, urgency of urination, frequency of urination, incomplete emptying, incontinence, abnormal bleeding, painful periods, heavy periods, vaginal discharge, vaginal odor, painful intercourse, sexual problems, bleeding after intercourse.  Musculoskeletal: No muscle weakness  Skin/Breast: No painful breasts, nipple discharge, masses, rash, ulcers  Neurological: No passing out, seizures, numbness, headache  Endocrine: No diabetes, hypothyroid, hyperthyroid, hot flashes, hair loss, abnormal hair growth, ance  Psychiatric: No depression, crying  Hematologic: No bruises, bleeding, swollen lymph nodes, anemia.      OBJECTIVE:   The patient appears well, alert, oriented x 3, in no distress.  /66   Ht 5' 2.5" (1.588 m)   Wt 56.7 kg (125 lb)   BMI 22.50 kg/m²   NECK: no thyromegaly, trachea midline  SKIN: no acne, striae, hirsutism  BREAST EXAM: breasts appear normal, no suspicious masses, no skin or nipple changes or axillary nodes, bilateral implants, no palpable abnormalities otherwise  ABDOMEN: no hernias, masses, or hepatosplenomegaly  GENITALIA: normal external genitalia, no erythema, no discharge  URETHRA: normal urethra, normal urethral meatus  VAGINA: vaginal discharge copious and white  CERVIX: absent  UTERUS: uterus absent  ADNEXA: no mass, fullness, tenderness      ASSESSMENT:   well " woman  no contraindication to continue hormonal therapy  1. Well woman exam with routine gynecological exam    2. BV (bacterial vaginosis)    3. Vaginal discharge        PLAN:   mammogram  return annually or prn  Orders Placed This Encounter    metronidazole (FLAGYL) 500 MG tablet

## 2017-09-25 NOTE — LETTER
September 25, 2017      Elieser Rollins MD  [address] [facility] [address]           [facility] [address] [place], [address]  [facility] [address]  Phone: [phone]  Fax: [phone]          Patient: Ayana Anthony   MR Number: [id number]   YOB: 1970   Date of Visit: 9/25/2017       Dear Dr. Elieser Rollins:    Thank you for referring Ayana Anthony to me for evaluation. Attached you will find relevant portions of my assessment and plan of care.    If you have questions, please do not hesitate to call me. I look forward to following Ayana Anthony along with you.    Sincerely,    Rupesh Small MD    Enclosure  CC:  No Recipients    If you would like to receive this communication electronically, please contact [email or web address] or [phone] to request more information on [employer] access.    For providers and/or their staff who would like to refer a patient to [facility], please contact us through our one-stop-shop provider referral line, [facility], at [phone].    If you feel you have received this communication in error or would no longer like to receive these types of communications, please e-mail [email or web address]

## 2017-09-26 ENCOUNTER — TELEPHONE (OUTPATIENT)
Dept: FAMILY MEDICINE | Facility: CLINIC | Age: 47
End: 2017-09-26

## 2017-09-26 LAB
C TRACH DNA SPEC QL NAA+PROBE: NOT DETECTED
CANDIDA RRNA VAG QL PROBE: NEGATIVE
G VAGINALIS RRNA GENITAL QL PROBE: NEGATIVE
N GONORRHOEA DNA SPEC QL NAA+PROBE: NOT DETECTED
T VAGINALIS RRNA GENITAL QL PROBE: NEGATIVE

## 2017-09-26 NOTE — TELEPHONE ENCOUNTER
----- Message from Santa Sadler sent at 9/26/2017  2:32 PM CDT -----  Ms. Anthony has a referral to endocrinology. She is going to be seeing Dr. Quintero Friday on 10/06/17 at 10. Can you please change the referral to an external referral. They are also requesting her more recent lab work which I do not see in EPIC, can you fax that to me at [phone] and  I will fax all other information over to their office. Thank you

## 2017-09-28 ENCOUNTER — HOSPITAL ENCOUNTER (OUTPATIENT)
Dept: RADIOLOGY | Facility: HOSPITAL | Age: 47
Discharge: HOME | End: 2017-09-28
Attending: FAMILY MEDICINE
Payer: MEDICAID

## 2017-09-28 VITALS — BODY MASS INDEX: 23 KG/M2 | HEIGHT: 62 IN | WEIGHT: 125 LBS

## 2017-09-28 DIAGNOSIS — Z12.39 BREAST CANCER SCREENING: ICD-10-CM

## 2017-09-28 PROCEDURE — 77067 SCR MAMMO BI INCL CAD: CPT | Mod: TC

## 2017-10-12 RX ORDER — METRONIDAZOLE 500 MG/1
500 TABLET ORAL 2 TIMES DAILY
Refills: 0 | COMMUNITY
Start: 2017-10-10 | End: 2018-03-14 | Stop reason: ALTCHOICE

## 2017-10-12 NOTE — TELEPHONE ENCOUNTER
Please send to [facility].      dicyclomine (BENTYL) 10 MG capsule  Take 1 capsule (10 mg total) by mouth 3 (three) times daily.   Normal, Disp-90 capsule, R-0

## 2017-10-14 RX ORDER — DICYCLOMINE HYDROCHLORIDE 10 MG/1
10 CAPSULE ORAL 3 TIMES DAILY
Qty: 90 CAPSULE | Refills: 0 | Status: SHIPPED | OUTPATIENT
Start: 2017-10-14 | End: 2017-12-07 | Stop reason: SDUPTHER

## 2017-11-03 ENCOUNTER — TELEPHONE (OUTPATIENT)
Dept: FAMILY MEDICINE | Facility: CLINIC | Age: 47
End: 2017-11-03

## 2017-11-03 ENCOUNTER — OFFICE VISIT (OUTPATIENT)
Dept: FAMILY MEDICINE | Facility: CLINIC | Age: 47
End: 2017-11-03
Payer: MEDICAID

## 2017-11-03 VITALS
WEIGHT: 133.38 LBS | OXYGEN SATURATION: 98 % | SYSTOLIC BLOOD PRESSURE: 90 MMHG | TEMPERATURE: 99 F | HEART RATE: 95 BPM | HEIGHT: 62 IN | BODY MASS INDEX: 24.54 KG/M2 | DIASTOLIC BLOOD PRESSURE: 68 MMHG

## 2017-11-03 DIAGNOSIS — J45.20 MILD INTERMITTENT ASTHMA, UNSPECIFIED WHETHER COMPLICATED: Primary | ICD-10-CM

## 2017-11-03 PROCEDURE — 99213 OFFICE O/P EST LOW 20 MIN: CPT | Mod: S$GLB,,, | Performed by: FAMILY MEDICINE

## 2017-11-03 RX ORDER — ALBUTEROL SULFATE 90 UG/1
2 AEROSOL, METERED RESPIRATORY (INHALATION) EVERY 4 HOURS PRN
Qty: 18 G | Refills: 3 | Status: SHIPPED | OUTPATIENT
Start: 2017-11-03 | End: 2018-03-14 | Stop reason: SDUPTHER

## 2017-11-03 RX ORDER — CODEINE PHOSPHATE AND GUAIFENESIN 10; 100 MG/5ML; MG/5ML
10 SOLUTION ORAL EVERY 4 HOURS PRN
Qty: 240 ML | Refills: 0 | Status: SHIPPED | OUTPATIENT
Start: 2017-11-03 | End: 2018-01-03 | Stop reason: SDUPTHER

## 2017-11-03 RX ORDER — AZITHROMYCIN 250 MG/1
TABLET, FILM COATED ORAL
Qty: 6 TABLET | Refills: 0 | Status: SHIPPED | OUTPATIENT
Start: 2017-11-03 | End: 2017-12-08 | Stop reason: ALTCHOICE

## 2017-11-03 RX ORDER — TRIAMCINOLONE ACETONIDE 40 MG/ML
80 INJECTION, SUSPENSION INTRA-ARTICULAR; INTRAMUSCULAR ONCE
Status: COMPLETED | OUTPATIENT
Start: 2017-11-03 | End: 2017-11-03

## 2017-11-03 RX ADMIN — TRIAMCINOLONE ACETONIDE 80 MG: 40 INJECTION, SUSPENSION INTRA-ARTICULAR; INTRAMUSCULAR at 09:11

## 2017-11-03 NOTE — TELEPHONE ENCOUNTER
No, not calling for pre auth for cough medicine.  She can either pay cash, or get robitussin DM OTC

## 2017-11-03 NOTE — PROGRESS NOTES
Subjective:      Patient ID: Ayana Anthony is a 47 y.o. female.    Chief Complaint: Cough; Nasal Congestion; and Emesis    Sick for 2 days; sore throat, bad cough, chest and throat with coughing, vomiting, feels horrible, tired, fever 103; had flu vaccine; no one else ill; no rx otc; non smoker; has asthma; hears wheezes      Cough     Emesis    Associated symptoms include coughing.     Review of Systems   Respiratory: Positive for cough.    Gastrointestinal: Positive for vomiting.     Objective:     Physical Exam  Assessment:     1. Mild intermittent asthma, unspecified whether complicated      Plan:     New Prescriptions    AZITHROMYCIN (Z-MARTHA) 250 MG TABLET    Take 2 tablets by mouth on day 1; Take 1 tablet by mouth on days 2-5    GUAIFENESIN-CODEINE 100-10 MG/5 ML (TUSSI-ORGANIDIN NR)  MG/5 ML SYRUP    Take 10 mLs by mouth every 4 (four) hours as needed for Cough.     Discontinued Medications    No medications on file     Modified Medications    Modified Medication Previous Medication    ALBUTEROL (PROAIR HFA) 90 MCG/ACTUATION INHALER albuterol (PROAIR HFA) 90 mcg/actuation inhaler       Inhale 2 puffs into the lungs every 4 (four) hours as needed for Wheezing or Shortness of Breath.    Inhale 2 puffs into the lungs every 4 (four) hours as needed for Wheezing or Shortness of Breath.       Mild intermittent asthma, unspecified whether complicated    Other orders  -     albuterol (PROAIR HFA) 90 mcg/actuation inhaler; Inhale 2 puffs into the lungs every 4 (four) hours as needed for Wheezing or Shortness of Breath.  Dispense: 18 g; Refill: 3  -     triamcinolone acetonide injection 80 mg; Inject 2 mLs (80 mg total) into the muscle once.  -     guaifenesin-codeine 100-10 mg/5 ml (TUSSI-ORGANIDIN NR)  mg/5 mL syrup; Take 10 mLs by mouth every 4 (four) hours as needed for Cough.  Dispense: 240 mL; Refill: 0  -     azithromycin (Z-MARTHA) 250 MG tablet; Take 2 tablets by mouth on day 1; Take 1 tablet by mouth  on days 2-5  Dispense: 6 tablet; Refill: 0      2 cc IM right upper outer quad buttock, MM, NP present

## 2017-11-03 NOTE — TELEPHONE ENCOUNTER
----- Message from Brea Walters sent at 11/3/2017  9:32 AM CDT -----  Contact: The pt called  The cough medicine that she was just given requires a pre auth.  The number to call is [phone].  That the only way she can get it.

## 2017-11-27 ENCOUNTER — TELEPHONE (OUTPATIENT)
Dept: FAMILY MEDICINE | Facility: CLINIC | Age: 47
End: 2017-11-27

## 2017-11-27 DIAGNOSIS — R10.30 LOWER ABDOMINAL PAIN: Primary | ICD-10-CM

## 2017-11-27 NOTE — TELEPHONE ENCOUNTER
Pt:  [phone]    Pt called to say that she is having abd pain again.  Thinks is from the diverticulitis.  She states that you told her to let you know if it continued.  You would order a CT Scan.     Also, she wanted you to know that her glucose has been running low.  It has been in the 30-40's.

## 2017-11-28 NOTE — TELEPHONE ENCOUNTER
CT ordered; she is not on DM RX; come bring her machine in to see if it is accurate by checking simultaneously with ours.  Why does she check it anyway?

## 2017-12-01 ENCOUNTER — CLINICAL SUPPORT (OUTPATIENT)
Dept: FAMILY MEDICINE | Facility: CLINIC | Age: 47
End: 2017-12-01
Payer: MEDICAID

## 2017-12-01 ENCOUNTER — TELEPHONE (OUTPATIENT)
Dept: FAMILY MEDICINE | Facility: CLINIC | Age: 47
End: 2017-12-01

## 2017-12-01 DIAGNOSIS — E16.2 HYPOGLYCEMIA: Primary | ICD-10-CM

## 2017-12-01 LAB — GLUCOSE SERPL-MCNC: 56 MG/DL (ref 70–110)

## 2017-12-01 PROCEDURE — 82948 REAGENT STRIP/BLOOD GLUCOSE: CPT | Mod: ,,, | Performed by: FAMILY MEDICINE

## 2017-12-01 RX ORDER — FENTANYL 25 UG/1
PATCH TRANSDERMAL
Refills: 0 | COMMUNITY
Start: 2017-11-15 | End: 2018-06-15

## 2017-12-01 NOTE — TELEPHONE ENCOUNTER
Dr Rollins notified.  Pt states that her glucose has been running low.  She has had hypoglycemia in the past.    Pt offered an apt with Dr Rollins, but she declined.  She states that she has an apt with Dr Reid endocrinologist on next week.  She will discuss it with her then.

## 2017-12-01 NOTE — TELEPHONE ENCOUNTER
Patient requesting a prescription for a blood glucose meter sent to [facility] [place]

## 2017-12-07 ENCOUNTER — TELEPHONE (OUTPATIENT)
Dept: FAMILY MEDICINE | Facility: CLINIC | Age: 47
End: 2017-12-07

## 2017-12-07 RX ORDER — DICYCLOMINE HYDROCHLORIDE 10 MG/1
10 CAPSULE ORAL 3 TIMES DAILY
Qty: 90 CAPSULE | Refills: 0 | Status: SHIPPED | OUTPATIENT
Start: 2017-12-07 | End: 2018-01-06

## 2017-12-07 NOTE — TELEPHONE ENCOUNTER
Patient is requesting Dicyclomine 10mg     Take 1 capsule three times a day  QTY 90     Please send to [facility] [place]

## 2017-12-08 PROBLEM — R94.6 THYROID FUNCTION TEST ABNORMAL: Status: ACTIVE | Noted: 2017-12-08

## 2017-12-08 PROBLEM — E16.2 HYPOGLYCEMIA: Status: ACTIVE | Noted: 2017-12-08

## 2017-12-08 PROBLEM — Z86.69 HISTORY OF MIGRAINE HEADACHES: Status: ACTIVE | Noted: 2017-12-08

## 2017-12-20 NOTE — TELEPHONE ENCOUNTER
----- Message from Nydia Young sent at 12/20/2017  2:49 PM CST -----  Patient is requesting a medication refill.     RX name: diazePAM (VALIUM) 10 MG Tab  Strength:   Quantity:   Directions: Half in AM, one in PM    Pharmacy name: [facility]

## 2017-12-21 NOTE — TELEPHONE ENCOUNTER
----- Message from Leroy Mcgregor sent at 12/21/2017  9:51 AM CST -----  Contact: self  Patient is requesting refills on diazePAM (VALIUM) 10 MG Tab. She is completely out of her medication and would like it called in before [place]. Patient states she has been trying to get this taking care of for 3 days now.

## 2017-12-22 RX ORDER — DIAZEPAM 10 MG/1
TABLET ORAL
Qty: 45 TABLET | Refills: 2 | Status: SHIPPED | OUTPATIENT
Start: 2017-12-22 | End: 2018-03-14 | Stop reason: SDUPTHER

## 2018-01-04 RX ORDER — TIZANIDINE 4 MG/1
TABLET ORAL
Qty: 240 ML | Refills: 0 | Status: SHIPPED | OUTPATIENT
Start: 2018-01-04 | End: 2018-03-14 | Stop reason: ALTCHOICE

## 2018-01-05 NOTE — TELEPHONE ENCOUNTER
Please send to [facility]    Inject 1 mL (1,000 mcg total) into the skin every 28 days.   Normal, Disp-10 mL, R-2

## 2018-01-08 RX ORDER — CYANOCOBALAMIN 1000 UG/ML
INJECTION, SOLUTION INTRAMUSCULAR; SUBCUTANEOUS
Qty: 10 ML | Refills: 2 | Status: SHIPPED | OUTPATIENT
Start: 2018-01-08 | End: 2022-09-09

## 2018-01-09 ENCOUNTER — TELEPHONE (OUTPATIENT)
Dept: FAMILY MEDICINE | Facility: CLINIC | Age: 48
End: 2018-01-09

## 2018-01-09 RX ORDER — PROMETHAZINE HYDROCHLORIDE 25 MG/1
25 TABLET ORAL EVERY 4 HOURS
Qty: 12 TABLET | Refills: 0 | Status: SHIPPED | OUTPATIENT
Start: 2018-01-09 | End: 2018-01-25 | Stop reason: SDUPTHER

## 2018-01-09 RX ORDER — PROMETHAZINE HYDROCHLORIDE 25 MG/1
25 TABLET ORAL EVERY 4 HOURS
Qty: 12 TABLET | Refills: 0 | Status: SHIPPED | OUTPATIENT
Start: 2018-01-09 | End: 2018-01-09 | Stop reason: SDUPTHER

## 2018-01-09 NOTE — TELEPHONE ENCOUNTER
----- Message from Nydia Young sent at 1/9/2018  2:34 PM CST -----  Patient would like something called in for nausea & vomiting. Started 2-3 days ago. Patient taking OTC Nauzene. Can't keep nothing down. If she trys, 15-20 minutes its coming back up    Ivan

## 2018-01-25 NOTE — TELEPHONE ENCOUNTER
Please send to [facility]     promethazine (PHENERGAN) 25 MG tablet  Take 1 tablet (25 mg total) by mouth every 4 (four) hours.   Normal, Disp-12 tablet, R-0

## 2018-01-26 RX ORDER — PROMETHAZINE HYDROCHLORIDE 25 MG/1
25 TABLET ORAL EVERY 4 HOURS
Qty: 12 TABLET | Refills: 0 | Status: SHIPPED | OUTPATIENT
Start: 2018-01-26 | End: 2018-02-14 | Stop reason: SDUPTHER

## 2018-02-06 ENCOUNTER — TELEPHONE (OUTPATIENT)
Dept: FAMILY MEDICINE | Facility: CLINIC | Age: 48
End: 2018-02-06

## 2018-02-06 RX ORDER — ESTROGENS, CONJUGATED 1.25 MG
TABLET ORAL
Qty: 30 TABLET | Refills: 1 | Status: SHIPPED | OUTPATIENT
Start: 2018-02-06 | End: 2018-04-02 | Stop reason: SDUPTHER

## 2018-02-11 ENCOUNTER — HOSPITAL ENCOUNTER (EMERGENCY)
Facility: HOSPITAL | Age: 48
Discharge: HOME | End: 2018-02-11
Attending: FAMILY MEDICINE
Payer: MEDICAID

## 2018-02-11 VITALS
BODY MASS INDEX: 22.82 KG/M2 | RESPIRATION RATE: 16 BRPM | HEIGHT: 62 IN | OXYGEN SATURATION: 97 % | HEART RATE: 74 BPM | SYSTOLIC BLOOD PRESSURE: 122 MMHG | TEMPERATURE: 98 F | DIASTOLIC BLOOD PRESSURE: 77 MMHG | WEIGHT: 124 LBS

## 2018-02-11 DIAGNOSIS — R07.89 CHEST WALL PAIN: ICD-10-CM

## 2018-02-11 DIAGNOSIS — R07.81 RIB PAIN ON RIGHT SIDE: Primary | ICD-10-CM

## 2018-02-11 PROCEDURE — 63600175 PHARM REV CODE 636 W HCPCS: Performed by: FAMILY MEDICINE

## 2018-02-11 PROCEDURE — 99283 EMERGENCY DEPT VISIT LOW MDM: CPT | Mod: 25

## 2018-02-11 PROCEDURE — 96372 THER/PROPH/DIAG INJ SC/IM: CPT

## 2018-02-11 RX ORDER — KETOROLAC TROMETHAMINE 30 MG/ML
30 INJECTION, SOLUTION INTRAMUSCULAR; INTRAVENOUS
Status: COMPLETED | OUTPATIENT
Start: 2018-02-11 | End: 2018-02-11

## 2018-02-11 RX ORDER — ORPHENADRINE CITRATE 30 MG/ML
60 INJECTION INTRAMUSCULAR; INTRAVENOUS
Status: COMPLETED | OUTPATIENT
Start: 2018-02-11 | End: 2018-02-11

## 2018-02-11 RX ADMIN — KETOROLAC TROMETHAMINE 30 MG: 30 INJECTION, SOLUTION INTRAMUSCULAR at 07:02

## 2018-02-11 RX ADMIN — ORPHENADRINE CITRATE 60 MG: 30 INJECTION INTRAMUSCULAR; INTRAVENOUS at 07:02

## 2018-02-12 NOTE — ED PROVIDER NOTES
Encounter Date: 2018       History     Chief Complaint   Patient presents with    Rib Injury     Pt states spouse fell and landed on her knocking her into cinder block and landing on right rib area, states pain with breathing.  Fall occurred x 2 days ago.      Patient complains of right anterior rib pain since she fell 2 days ago and sustained injury.  She's been taking pain medication at home but pain persisted.  She denies any shortness of breath, cough, fever.  She denies any nausea vomiting or diarrhea.  No blood in the stool.  Normal bowel movement.      The history is provided by the patient.     Review of patient's allergies indicates:   Allergen Reactions    Bactrim [sulfamethoxazole-trimethoprim] Itching    Tramadol     Vancomycin analogues Rash     Past Medical History:   Diagnosis Date    B12 deficiency anemia     Chronic pain     Depression     Epilepsy     Hematuria     Hypertension     Insomnia     Insomnia     Migraines      Past Surgical History:   Procedure Laterality Date    APPENDECTOMY      BELT ABDOMINOPLASTY      breast implants  2013    BREAST SURGERY Bilateral          SECTION, LOW TRANSVERSE      x3    CHOLECYSTECTOMY      GASTRIC BYPASS      HYSTERECTOMY      REDUCTION MAMMAPLASTY      reduction and implants    TONSILLECTOMY, ADENOIDECTOMY       Family History   Problem Relation Age of Onset    Diabetes Mother     Hypertension Mother     Heart disease Mother     Cancer Mother      breast    Breast cancer Mother     Kidney disease Father     No Known Problems Sister     No Known Problems Brother     No Known Problems Daughter     No Known Problems Son     Colon cancer Neg Hx     Ovarian cancer Neg Hx      Social History   Substance Use Topics    Smoking status: Never Smoker    Smokeless tobacco: Never Used    Alcohol use No     Review of Systems   Constitutional: Negative for activity change, appetite change, chills and fever.    HENT: Negative for congestion, sinus pain, sinus pressure and sore throat.    Eyes: Negative for pain, discharge and itching.   Respiratory: Negative for cough, shortness of breath and wheezing.    Cardiovascular: Positive for chest pain. Negative for palpitations.   Gastrointestinal: Negative for abdominal distention, abdominal pain, diarrhea, nausea and vomiting.   Genitourinary: Negative for dysuria.   Musculoskeletal: Negative for neck pain and neck stiffness.   Skin: Negative for rash.   Neurological: Negative for dizziness, weakness, light-headedness, numbness and headaches.   Psychiatric/Behavioral: Negative for confusion and hallucinations.       Physical Exam     Initial Vitals [02/11/18 1755]   BP Pulse Resp Temp SpO2   119/66 110 16 98.1 °F (36.7 °C) 97 %      MAP       83.67         Physical Exam    Nursing note and vitals reviewed.  Constitutional: She appears well-developed and well-nourished.   HENT:   Head: Normocephalic.   Right Ear: External ear normal.   Left Ear: External ear normal.   Nose: Nose normal.   Mouth/Throat: Oropharynx is clear and moist.   Eyes: EOM are normal. Pupils are equal, round, and reactive to light.   Neck: Normal range of motion. Neck supple.   Cardiovascular: Normal rate, regular rhythm, normal heart sounds and intact distal pulses.   Pulmonary/Chest: Breath sounds normal. She exhibits tenderness.       Patient is tender on the right anterior lower rib area.  Along the midclavicular line.  She denies any abdominal pain.  No external bruising or swelling or deformities.   Abdominal: Soft. Bowel sounds are normal.   Musculoskeletal: Normal range of motion.   Neurological: She is alert and oriented to person, place, and time. She has normal strength and normal reflexes. She displays normal reflexes. No cranial nerve deficit.   Skin: Skin is warm. Capillary refill takes less than 2 seconds.         ED Course   Procedures  Labs Reviewed - No data to display          Medical  Decision Making:   Initial Assessment:   Patient slipped and fell and injured her right anterior rib area about 2 days ago and taking pain medication at home but pain continued so she came to the ER for evaluation.  Differential Diagnosis:   Right hip fracture, right rib contusion, lung contusion, liver contusion,  ED Management:  Patient x-rays been negative for rib fracture.  She denies any shortness of breath, cough cold or fevers.  Normal breathing.  No distress.  Denies any vomiting or bleeding from rectum.  No abdominal pain.  Normal appetite.  Patient does not have any intra-abdominal signs and symptoms.  Pain is localized to anterior rib cage area along the midclavicular line.  Unlikely to be any intraoral cavity contusion mostly looks like he wanted to the rib area.  X-rays negative for fracture.  Patient is given Toradol and Flexeril and advised to follow-up with the primary care physician if pain persist even after taking her pain medication.  Advised to follow-up ER with any blood in vomiting or stool or abdominal pain or distention.                   ED Course      Clinical Impression:   The primary encounter diagnosis was Rib pain on right side. A diagnosis of Chest wall pain was also pertinent to this visit.    Disposition:   Disposition: Discharged  Condition: Madhav Garner MD  02/12/18 0021

## 2018-02-12 NOTE — ED TRIAGE NOTES
"Patient is AAOX3, reports her spouse tripped and fell on her and she fell in between him and the "cinder block" that was on the ground. No obvious bruising noted to right lateral side of her rib cage. Pain upon taking deep breaths on inspiration and expiration.   "

## 2018-02-15 RX ORDER — HYDROCHLOROTHIAZIDE 25 MG/1
25 TABLET ORAL DAILY
Qty: 90 TABLET | Refills: 3 | Status: SHIPPED | OUTPATIENT
Start: 2018-02-15 | End: 2018-03-14

## 2018-02-15 RX ORDER — FOLIC ACID 1 MG/1
TABLET ORAL
Qty: 90 TABLET | Refills: 3 | Status: SHIPPED | OUTPATIENT
Start: 2018-02-15 | End: 2022-09-09

## 2018-02-15 RX ORDER — AMLODIPINE BESYLATE 10 MG/1
10 TABLET ORAL DAILY
Qty: 90 TABLET | Refills: 3 | Status: SHIPPED | OUTPATIENT
Start: 2018-02-15 | End: 2018-04-13 | Stop reason: SDUPTHER

## 2018-02-15 RX ORDER — PROMETHAZINE HYDROCHLORIDE 25 MG/1
25 TABLET ORAL EVERY 4 HOURS
Qty: 12 TABLET | Refills: 0 | Status: SHIPPED | OUTPATIENT
Start: 2018-02-15 | End: 2018-03-09 | Stop reason: SDUPTHER

## 2018-03-04 ENCOUNTER — HOSPITAL ENCOUNTER (EMERGENCY)
Facility: HOSPITAL | Age: 48
Discharge: HOME | End: 2018-03-04
Payer: MEDICAID

## 2018-03-04 VITALS
DIASTOLIC BLOOD PRESSURE: 72 MMHG | TEMPERATURE: 99 F | BODY MASS INDEX: 22.08 KG/M2 | HEART RATE: 77 BPM | OXYGEN SATURATION: 99 % | RESPIRATION RATE: 18 BRPM | SYSTOLIC BLOOD PRESSURE: 133 MMHG | HEIGHT: 62 IN | WEIGHT: 120 LBS

## 2018-03-04 DIAGNOSIS — N12 PYELONEPHRITIS: Primary | ICD-10-CM

## 2018-03-04 LAB
ALBUMIN SERPL BCP-MCNC: 4.9 G/DL
ALP SERPL-CCNC: 103 U/L
ALT SERPL W/O P-5'-P-CCNC: 20 U/L
ANION GAP SERPL CALC-SCNC: 18 MMOL/L
AST SERPL-CCNC: 27 U/L
BACTERIA #/AREA URNS AUTO: ABNORMAL /HPF
BASOPHILS # BLD AUTO: 0.06 K/UL
BASOPHILS NFR BLD: 0.6 %
BILIRUB SERPL-MCNC: 0.2 MG/DL
BILIRUB UR QL STRIP: NEGATIVE
BUN SERPL-MCNC: 31 MG/DL
CALCIUM SERPL-MCNC: 9.3 MG/DL
CHLORIDE SERPL-SCNC: 100 MMOL/L
CLARITY UR REFRACT.AUTO: ABNORMAL
CO2 SERPL-SCNC: 20 MMOL/L
COLOR UR AUTO: ABNORMAL
CREAT SERPL-MCNC: 1.3 MG/DL
DIFFERENTIAL METHOD: ABNORMAL
EOSINOPHIL # BLD AUTO: 0.3 K/UL
EOSINOPHIL NFR BLD: 2.7 %
ERYTHROCYTE [DISTWIDTH] IN BLOOD BY AUTOMATED COUNT: 12.7 %
EST. GFR  (AFRICAN AMERICAN): 55.7 ML/MIN/1.73 M^2
EST. GFR  (NON AFRICAN AMERICAN): 48.3 ML/MIN/1.73 M^2
GLUCOSE SERPL-MCNC: 70 MG/DL
GLUCOSE UR QL STRIP: NEGATIVE
HCT VFR BLD AUTO: 39.3 %
HGB BLD-MCNC: 13.4 G/DL
HGB UR QL STRIP: ABNORMAL
HYALINE CASTS UR QL AUTO: 0 /LPF
KETONES UR QL STRIP: NEGATIVE
LEUKOCYTE ESTERASE UR QL STRIP: ABNORMAL
LYMPHOCYTES # BLD AUTO: 1.7 K/UL
LYMPHOCYTES NFR BLD: 17 %
MCH RBC QN AUTO: 30.6 PG
MCHC RBC AUTO-ENTMCNC: 34.1 G/DL
MCV RBC AUTO: 90 FL
MICROSCOPIC COMMENT: ABNORMAL
MONOCYTES # BLD AUTO: 1.4 K/UL
MONOCYTES NFR BLD: 14.2 %
NEUTROPHILS # BLD AUTO: 6.3 K/UL
NEUTROPHILS NFR BLD: 65 %
NITRITE UR QL STRIP: POSITIVE
PH UR STRIP: 6 [PH] (ref 5–8)
PLATELET # BLD AUTO: 304 K/UL
PMV BLD AUTO: 10.3 FL
POTASSIUM SERPL-SCNC: 3.3 MMOL/L
PROT SERPL-MCNC: 9.4 G/DL
PROT UR QL STRIP: ABNORMAL
RBC # BLD AUTO: 4.38 M/UL
RBC #/AREA URNS AUTO: >100 /HPF (ref 0–4)
SODIUM SERPL-SCNC: 138 MMOL/L
SP GR UR STRIP: 1.01 (ref 1–1.03)
URN SPEC COLLECT METH UR: ABNORMAL
UROBILINOGEN UR STRIP-ACNC: 1 EU/DL
WBC # BLD AUTO: 9.76 K/UL
WBC #/AREA URNS AUTO: 15 /HPF (ref 0–5)

## 2018-03-04 PROCEDURE — 96361 HYDRATE IV INFUSION ADD-ON: CPT

## 2018-03-04 PROCEDURE — 80053 COMPREHEN METABOLIC PANEL: CPT

## 2018-03-04 PROCEDURE — 96374 THER/PROPH/DIAG INJ IV PUSH: CPT

## 2018-03-04 PROCEDURE — 81000 URINALYSIS NONAUTO W/SCOPE: CPT

## 2018-03-04 PROCEDURE — 85025 COMPLETE CBC W/AUTO DIFF WBC: CPT

## 2018-03-04 PROCEDURE — 96375 TX/PRO/DX INJ NEW DRUG ADDON: CPT

## 2018-03-04 PROCEDURE — 25000003 PHARM REV CODE 250: Performed by: PHYSICIAN ASSISTANT

## 2018-03-04 PROCEDURE — 63600175 PHARM REV CODE 636 W HCPCS: Performed by: PHYSICIAN ASSISTANT

## 2018-03-04 PROCEDURE — 99284 EMERGENCY DEPT VISIT MOD MDM: CPT

## 2018-03-04 PROCEDURE — 96372 THER/PROPH/DIAG INJ SC/IM: CPT | Mod: 59

## 2018-03-04 RX ORDER — ONDANSETRON 2 MG/ML
4 INJECTION INTRAMUSCULAR; INTRAVENOUS
Status: COMPLETED | OUTPATIENT
Start: 2018-03-04 | End: 2018-03-04

## 2018-03-04 RX ORDER — SODIUM CHLORIDE 9 MG/ML
1000 INJECTION, SOLUTION INTRAVENOUS
Status: COMPLETED | OUTPATIENT
Start: 2018-03-04 | End: 2018-03-04

## 2018-03-04 RX ORDER — KETOROLAC TROMETHAMINE 30 MG/ML
30 INJECTION, SOLUTION INTRAMUSCULAR; INTRAVENOUS
Status: COMPLETED | OUTPATIENT
Start: 2018-03-04 | End: 2018-03-04

## 2018-03-04 RX ORDER — CEFTRIAXONE 1 G/1
1 INJECTION, POWDER, FOR SOLUTION INTRAMUSCULAR; INTRAVENOUS
Status: COMPLETED | OUTPATIENT
Start: 2018-03-04 | End: 2018-03-04

## 2018-03-04 RX ORDER — HYDROCODONE BITARTRATE AND ACETAMINOPHEN 5; 325 MG/1; MG/1
1 TABLET ORAL EVERY 6 HOURS PRN
Qty: 10 TABLET | Refills: 0 | Status: SHIPPED | OUTPATIENT
Start: 2018-03-04 | End: 2018-03-14 | Stop reason: ALTCHOICE

## 2018-03-04 RX ORDER — CIPROFLOXACIN 500 MG/1
500 TABLET ORAL 2 TIMES DAILY
Qty: 20 TABLET | Refills: 0 | Status: SHIPPED | OUTPATIENT
Start: 2018-03-04 | End: 2018-03-14 | Stop reason: ALTCHOICE

## 2018-03-04 RX ORDER — ONDANSETRON 4 MG/1
4 TABLET, FILM COATED ORAL EVERY 6 HOURS PRN
Qty: 12 TABLET | Refills: 0 | Status: SHIPPED | OUTPATIENT
Start: 2018-03-04 | End: 2018-03-14 | Stop reason: ALTCHOICE

## 2018-03-04 RX ADMIN — SODIUM CHLORIDE 1000 ML: 0.9 INJECTION, SOLUTION INTRAVENOUS at 01:03

## 2018-03-04 RX ADMIN — CEFTRIAXONE SODIUM 1 G: 1 INJECTION, POWDER, FOR SOLUTION INTRAMUSCULAR; INTRAVENOUS at 02:03

## 2018-03-04 RX ADMIN — ONDANSETRON 4 MG: 2 INJECTION INTRAMUSCULAR; INTRAVENOUS at 03:03

## 2018-03-04 RX ADMIN — KETOROLAC TROMETHAMINE 30 MG: 30 INJECTION, SOLUTION INTRAMUSCULAR at 03:03

## 2018-03-04 NOTE — ED PROVIDER NOTES
"Encounter Date: 3/4/2018       History     Chief Complaint   Patient presents with    Hematuria     "I started urinating blood last night and it hurts a little in my low back" denies trauma or fever     Patient is a 48-year-old female with a history of "kidney problems" who presents to emergency department today for hematuria which began yesterday evening.  Patient reports that the hematuria has worsened since yesterday, she reports that her urine sample today was as read as the bag that she place it in.  Patient reports that she's had issues with her kidneys in the past, for which she's had to be hospitalized and was performs cystoscopes as well as through a catheters with washing of her bladder.  She reports that she does have left-sided flank pain but states that she always has left-sided back pain, that is nothing new.  Patient reports no dysuria with her hematuria.  Patient denies any fever.  Patient reports her last time she had this issue was 5 years ago, she was seen at [facility].  Patient reports that she does not drink any water, she drinks about 3 cans of Coke a day.  He also reports lower abdominal pain upon further questioning.          Review of patient's allergies indicates:   Allergen Reactions    Bactrim [sulfamethoxazole-trimethoprim] Itching    Tramadol     Vancomycin analogues Rash     Past Medical History:   Diagnosis Date    B12 deficiency anemia     Chronic pain     Depression     Epilepsy     Hematuria     Hypertension     Insomnia     Insomnia     Migraines      Past Surgical History:   Procedure Laterality Date    APPENDECTOMY      BELT ABDOMINOPLASTY      breast implants  2013    BREAST SURGERY Bilateral          SECTION, LOW TRANSVERSE      x3    CHOLECYSTECTOMY      GASTRIC BYPASS      HYSTERECTOMY      REDUCTION MAMMAPLASTY      reduction and implants    TONSILLECTOMY, ADENOIDECTOMY       Family History   Problem Relation Age of Onset "    Diabetes Mother     Hypertension Mother     Heart disease Mother     Cancer Mother      breast    Breast cancer Mother     Kidney disease Father     No Known Problems Sister     No Known Problems Brother     No Known Problems Daughter     No Known Problems Son     Colon cancer Neg Hx     Ovarian cancer Neg Hx      Social History   Substance Use Topics    Smoking status: Never Smoker    Smokeless tobacco: Never Used    Alcohol use No     Review of Systems   Constitutional: Negative for fever.   HENT: Negative for sore throat.    Respiratory: Negative for shortness of breath.    Cardiovascular: Negative for chest pain.   Gastrointestinal: Positive for abdominal pain. Negative for nausea and vomiting.   Genitourinary: Positive for hematuria. Negative for dysuria.   Musculoskeletal: Positive for back pain.   Skin: Negative for rash.   Neurological: Negative for weakness.   Hematological: Does not bruise/bleed easily.       Physical Exam     Initial Vitals [03/04/18 1153]   BP Pulse Resp Temp SpO2   136/81 85 17 98.2 °F (36.8 °C) 99 %      MAP       99.33         Physical Exam    Nursing note and vitals reviewed.  Constitutional: She appears well-developed and well-nourished. No distress.   HENT:   Head: Normocephalic and atraumatic.   Nose: Nose normal.   Eyes: Conjunctivae and EOM are normal. Pupils are equal, round, and reactive to light.   Neck: Normal range of motion. Neck supple.   Cardiovascular: Normal rate, regular rhythm and normal heart sounds. Exam reveals no gallop and no friction rub.    No murmur heard.  Pulmonary/Chest: Breath sounds normal. No respiratory distress. She has no wheezes. She has no rhonchi. She has no rales.   Abdominal: Soft. Bowel sounds are normal. There is tenderness. There is no rigidity, no rebound, no guarding, no tenderness at McBurney's point and negative Sanderson's sign.       Musculoskeletal: Normal range of motion. She exhibits no edema.        Right shoulder:  She exhibits pain.        Arms:  Neurological: She is alert and oriented to person, place, and time. No cranial nerve deficit.   Skin: Skin is warm and dry.   Psychiatric: She has a normal mood and affect. Her behavior is normal. Judgment and thought content normal.       Imaging Results          CT Abdomen Pelvis  Without Contrast (Final result)  Result time 03/04/18 15:11:17   Procedure changed from CT Abdomen Without Contrast     Final result by Santos Mejia MD (03/04/18 15:11:17)                 Impression:       Distal small bowel loops are mildly prominent with an mild mucosal thickening with fluid and feces. No definite transition is identified. Findings can be seen with enteritis. Followup as clinically warranted    All CT scans at this facility use dose modulation, iterative reconstruction and/or weight based dosing when appropriate to reduce radiation dose to as low as reasonably achievable.      Electronically signed by: SANTOS MEJIA MD  Date:     03/04/18  Time:    15:11              Narrative:    Indication: Abdominal pain.    Routine noncontrast CT images of the abdomen and pelvis were obtained.    Findings: Comparison made 10/8/1314    The lung bases are well aerated.  Heart size is normal.  No pericardial or pleural effusion.      The liver is normal in size and attenuation on this noncontrast exam.  The   spleen, pancreas, adrenal glands are normal.  Postoperative changes are seen within the stomach which likely reflect gastric bypass. Gallbladder is surgically absent. Aorta demonstrates no significant atherosclerotic disease.      The kidneys are normal in size shape and position without radiopaque calculus or signs of hydronephrosis. The bladder is incompletely distended.  At uterus and ovaries are not well seen.    Distal small bowel loops are mildly prominent with fluid and feces. No definite transition is identified. Followup as clinically warranted..The appendix is not visualized in the  right lower quadrant.  There is no inflammation. Colon is unremarkable.      Bones appear intact .                              ED Course   Procedures  Labs Reviewed   URINALYSIS              Results for orders placed or performed during the hospital encounter of 03/04/18   Urinalysis   Result Value Ref Range    Specimen UA Urine, Clean Catch     Color, UA Red (A) Yellow, Straw, Landy    Appearance, UA Cloudy (A) Clear    pH, UA 6.0 5.0 - 8.0    Specific Gravity, UA 1.015 1.005 - 1.030    Protein, UA 2+ (A) Negative    Glucose, UA Negative Negative    Ketones, UA Negative Negative    Bilirubin (UA) Negative Negative    Occult Blood UA 3+ (A) Negative    Nitrite, UA Positive (A) Negative    Urobilinogen, UA 1.0 <2.0 EU/dL    Leukocytes, UA 2+ (A) Negative   CBC auto differential   Result Value Ref Range    WBC 9.76 3.90 - 12.70 K/uL    RBC 4.38 4.00 - 5.40 M/uL    Hemoglobin 13.4 12.0 - 16.0 g/dL    Hematocrit 39.3 37.0 - 48.5 %    MCV 90 82 - 98 fL    MCH 30.6 27.0 - 31.0 pg    MCHC 34.1 32.0 - 36.0 g/dL    RDW 12.7 11.5 - 14.5 %    Platelets 304 150 - 350 K/uL    MPV 10.3 9.2 - 12.9 fL    Gran # (ANC) 6.3 1.8 - 7.7 K/uL    Lymph # 1.7 1.0 - 4.8 K/uL    Mono # 1.4 (H) 0.3 - 1.0 K/uL    Eos # 0.3 0.0 - 0.5 K/uL    Baso # 0.06 0.00 - 0.20 K/uL    Gran% 65.0 38.0 - 73.0 %    Lymph% 17.0 (L) 18.0 - 48.0 %    Mono% 14.2 4.0 - 15.0 %    Eosinophil% 2.7 0.0 - 8.0 %    Basophil% 0.6 0.0 - 1.9 %    Differential Method Automated    Comprehensive metabolic panel   Result Value Ref Range    Sodium 138 136 - 145 mmol/L    Potassium 3.3 (L) 3.5 - 5.1 mmol/L    Chloride 100 95 - 110 mmol/L    CO2 20 (L) 23 - 29 mmol/L    Glucose 70 70 - 110 mg/dL    BUN, Bld 31 (H) 7 - 17 mg/dL    Creatinine 1.30 0.50 - 1.40 mg/dL    Calcium 9.3 8.7 - 10.5 mg/dL    Total Protein 9.4 (H) 6.0 - 8.4 g/dL    Albumin 4.9 3.5 - 5.2 g/dL    Total Bilirubin 0.2 0.1 - 1.0 mg/dL    Alkaline Phosphatase 103 38 - 126 U/L    AST 27 15 - 46 U/L    ALT 20 10 -  "44 U/L    Anion Gap 18 (H) 8 - 16 mmol/L    eGFR if African American 55.7 (A) >60 mL/min/1.73 m^2    eGFR if non  48.3 (A) >60 mL/min/1.73 m^2   Urinalysis Microscopic   Result Value Ref Range    RBC, UA >100 (H) 0 - 4 /hpf    WBC, UA 15 (H) 0 - 5 /hpf    Bacteria, UA Moderate (A) None-Occ /hpf    Hyaline Casts, UA 0 0-1/lpf /lpf    Microscopic Comment SEE COMMENT        Medical Decision Making:   Initial Assessment:   Patient is a 48-year-old female with a history of "kidney problems" who presents to emergency department today for hematuria which began yesterday evening.  Patient reports that the hematuria has worsened since yesterday, she reports that her urine sample today was as read as the bag that she place it in.  Patient reports that she's had issues with her kidneys in the past, for which she's had to be hospitalized and was performs cystoscopes as well as through a catheters with washing of her bladder.  She reports that she does have left-sided flank pain but states that she always has left-sided back pain, that is nothing new.  Patient reports no dysuria with her hematuria.  Patient denies any fever.  Patient reports her last time she had this issue was 5 years ago, she was seen at [facility].  Patient reports that she does not drink any water, she drinks about 3 cans of Coke a day.  Differential Diagnosis:   Pyelonephritis, nephrolithiasis, painless hematuria  Clinical Tests:   Lab Tests: Ordered and Reviewed  The following lab test(s) were unremarkable: CBC, CMP and Urinalysis  Radiological Study: Ordered and Reviewed  ED Management:  Patient is a 48-year-old female who stated that she's had hematuria in the past in which she's been admitted, but she has not shoulder diagnosis was.  She reports that she believes she had a cystoscope, but she is not sure.  She reports that it was 2 catheters.  Patient reports that since yesterday evening she began having hematuria as well as " left flank pain.  Patient reports that she normally has back pain, and states that pain does not feel new.  Patient reports her last issue of this around 5 years ago, she is not seeing nephrology since then.  Patient reported that she had no fever at home, is afebrile in the ED.  Patient was ordered basic labs showed no clue no leukocytosis, no significant anemia, no electrolyte abnormalities of significance at this time.  Patient was shown to have significant amount of blood in the urine as well as white blood cells and week esterase and bacteria.  Discussed with patient most likely pyelonephritis versus nephrolithiasis, CT of the abdomen/pelvis without contrast ordered which showed no acute findings with regards to the kidneys.  I do believe patient most likely has significant urinary tract infection versus pyelonephritis.  She will be given Rocephin 1 g IV ×1 and 1 L normal saline fluid bolus.  Patient was asking for something for pain as well as nausea for which was given she improved.  We'll discharge patient home with a prescription for Cipro 500 twice a day since she is ALLERGIC to Bactrim.  I do not believe Macrobid will be enough for this patient.  Also entertain the possibility of rhabdomyolysis but since patient has hemoglobin as well as red blood cells and white blood cells, do not believe this is most likely diagnosis.                      Clinical Impression:   The encounter diagnosis was Pyelonephritis.    Disposition:   Disposition: Discharged  Condition: Stable                        Ramesh Rodriguez PA-C  03/04/18 3760

## 2018-03-04 NOTE — DISCHARGE INSTRUCTIONS
Take medications as prescribed.  Drink plenty of water for the next several days and continue to drink water from urine out.  Decrease caffeinated beverages as much as possible.  Follow-up with primary care physician in 2 days for reevaluation, return to emergency department for any new or worsening symptoms.

## 2018-03-09 DIAGNOSIS — Z01.419 WELL WOMAN EXAM: Primary | ICD-10-CM

## 2018-03-09 NOTE — TELEPHONE ENCOUNTER
Please send to [facility] [place]       promethazine (PHENERGAN) 25 MG tablet  Take 1 tablet (25 mg total) by mouth every 4 (four) hours.   Normal, Disp-12 tablet, R-0

## 2018-03-10 RX ORDER — PROMETHAZINE HYDROCHLORIDE 25 MG/1
25 TABLET ORAL EVERY 4 HOURS
Qty: 12 TABLET | Refills: 0 | Status: SHIPPED | OUTPATIENT
Start: 2018-03-10 | End: 2018-06-15 | Stop reason: SDUPTHER

## 2018-03-11 NOTE — TELEPHONE ENCOUNTER
Patient needs a Pap smear a place to referral to Dr. Magda Claudio.  Call patient her per good appointment

## 2018-03-12 ENCOUNTER — TELEPHONE (OUTPATIENT)
Dept: FAMILY MEDICINE | Facility: CLINIC | Age: 48
End: 2018-03-12

## 2018-03-12 DIAGNOSIS — N39.0 RECURRENT UTI: Primary | ICD-10-CM

## 2018-03-12 DIAGNOSIS — Z01.419 WELL WOMAN EXAM: ICD-10-CM

## 2018-03-12 NOTE — TELEPHONE ENCOUNTER
Pt was seen in the ER on 3/4 for UTI.  She states that they gave her Cipro which is causing N/V.      She is still having symptoms of pain and burning.  She still has blood in her urine.      Her allergy list has been updated.      Do you need to see her?   Urology?

## 2018-03-12 NOTE — TELEPHONE ENCOUNTER
----- Message from Brea Walters sent at 3/12/2018 10:23 AM CDT -----  Contact: The pt called  The pt was seen in the ER this past weekend for blood in her urine.  Was told to follow up with Dr Rollins today or tomorrow about it.  She can be reached at [phone]

## 2018-03-14 ENCOUNTER — OFFICE VISIT (OUTPATIENT)
Dept: FAMILY MEDICINE | Facility: CLINIC | Age: 48
End: 2018-03-14
Payer: MEDICAID

## 2018-03-14 ENCOUNTER — TELEPHONE (OUTPATIENT)
Dept: FAMILY MEDICINE | Facility: CLINIC | Age: 48
End: 2018-03-14

## 2018-03-14 VITALS
BODY MASS INDEX: 23.4 KG/M2 | DIASTOLIC BLOOD PRESSURE: 70 MMHG | TEMPERATURE: 99 F | HEART RATE: 100 BPM | SYSTOLIC BLOOD PRESSURE: 100 MMHG | WEIGHT: 127.19 LBS | OXYGEN SATURATION: 100 % | HEIGHT: 62 IN

## 2018-03-14 DIAGNOSIS — J45.20 MILD INTERMITTENT ASTHMA, UNSPECIFIED WHETHER COMPLICATED: ICD-10-CM

## 2018-03-14 DIAGNOSIS — R31.9 HEMATURIA, UNSPECIFIED TYPE: Primary | ICD-10-CM

## 2018-03-14 DIAGNOSIS — E87.6 HYPOKALEMIA: ICD-10-CM

## 2018-03-14 DIAGNOSIS — R77.9 ELEVATED BLOOD PROTEIN: ICD-10-CM

## 2018-03-14 PROCEDURE — 99213 OFFICE O/P EST LOW 20 MIN: CPT | Mod: S$GLB,,, | Performed by: FAMILY MEDICINE

## 2018-03-14 RX ORDER — ACETAMINOPHEN 500 MG
5000 TABLET ORAL DAILY
Qty: 90 TABLET | Refills: 3 | COMMUNITY
Start: 2018-03-14 | End: 2022-09-09

## 2018-03-14 RX ORDER — ALBUTEROL SULFATE 90 UG/1
2 AEROSOL, METERED RESPIRATORY (INHALATION) EVERY 4 HOURS PRN
Qty: 18 G | Refills: 3 | Status: SHIPPED | OUTPATIENT
Start: 2018-03-14 | End: 2019-03-14

## 2018-03-14 RX ORDER — LANOLIN ALCOHOL/MO/W.PET/CERES
50 CREAM (GRAM) TOPICAL DAILY
Qty: 100 TABLET | Refills: 11 | COMMUNITY
Start: 2018-03-14 | End: 2022-09-09

## 2018-03-14 RX ORDER — DIAZEPAM 10 MG/1
TABLET ORAL
Qty: 45 TABLET | Refills: 2 | Status: SHIPPED | OUTPATIENT
Start: 2018-03-14 | End: 2018-06-15 | Stop reason: SDUPTHER

## 2018-03-14 NOTE — TELEPHONE ENCOUNTER
----- Message from Brea Walters sent at 3/14/2018  9:01 AM CDT -----  Contact: the pt called  Patient would like to be seen today.  Refused appt with Lisette.  She can be reached at [phone]    Symptoms: Problems with asthma    How long has patient had these symptoms: several days    If unable to be seen , can something be called into patient pharmacy? Wants the appt    Pharmacy name: [facility]    Any drug allergies:

## 2018-03-14 NOTE — PROGRESS NOTES
Subjective:      Patient ID: Ayana Anthony is a 48 y.o. female.    Chief Complaint: Cough; Sinus Problem; Nasal Congestion (patient said she had her pap Smear in sept 2017); and Medication Refill (Albuterol, Vitamin  IU ,Valium, Vit B6, and talk about Zoloft)    Was in ER for voiding blood and left flank pain; bad kidney infection; had CT; no urine culture; took cipro until nausea kicked in and vomiting; stopping hctz because of low potassium and dehydration; no fluid intake, baths instead of showers; saw endo on [employer] for thyroid      Cough     Sinus Problem   Associated symptoms include coughing.   Medication Refill   Associated symptoms include coughing.     Review of Systems   Constitutional: Negative.    HENT: Negative.    Respiratory: Positive for cough.    Cardiovascular: Negative.    Gastrointestinal: Negative.    Endocrine: Negative.    Genitourinary: Negative.    Musculoskeletal: Negative.    Psychiatric/Behavioral: Negative.    All other systems reviewed and are negative.    Objective:     Physical Exam   Constitutional: She is oriented to person, place, and time. She appears well-developed and well-nourished.   HENT:   Head: Normocephalic.   Eyes: Conjunctivae and EOM are normal. Pupils are equal, round, and reactive to light.   Neck: Normal range of motion. Neck supple.   Cardiovascular: Normal rate, regular rhythm and normal heart sounds.    Pulmonary/Chest: Effort normal and breath sounds normal.   Musculoskeletal: Normal range of motion.   Neurological: She is alert and oriented to person, place, and time. She has normal reflexes.   Skin: Skin is warm and dry.   Psychiatric: She has a normal mood and affect. Her behavior is normal. Judgment and thought content normal.   Nursing note and vitals reviewed.    Assessment:     1. Hematuria, unspecified type    2. Hypokalemia    3. Elevated blood protein    4. Mild intermittent asthma, unspecified whether complicated      Plan:     New  Prescriptions    CHOLECALCIFEROL, VITAMIN D3, (VITAMIN D3) 5,000 UNIT TAB    Take 5,000 Units by mouth once daily.    PYRIDOXINE, VITAMIN B6, (B-6) 50 MG TAB    Take 1 tablet (50 mg total) by mouth once daily.     Discontinued Medications    CIPROFLOXACIN HCL (CIPRO) 500 MG TABLET    Take 1 tablet (500 mg total) by mouth 2 (two) times daily.    HYDROCHLOROTHIAZIDE (HYDRODIURIL) 25 MG TABLET    Take 1 tablet (25 mg total) by mouth once daily.    HYDROCODONE-ACETAMINOPHEN 5-325MG (NORCO) 5-325 MG PER TABLET    Take 1 tablet by mouth every 6 (six) hours as needed for Pain.    METRONIDAZOLE (FLAGYL) 500 MG TABLET    Take 500 mg by mouth 2 (two) times daily.    ONDANSETRON (ZOFRAN) 4 MG TABLET    Take 1 tablet (4 mg total) by mouth every 6 (six) hours as needed for Nausea.    VIRTUSSIN AC  MG/5 ML SYRUP    TAKE 10 ML BY MOUTH EVERY 4 HOURS AS NEEDED FOR COUGH     Modified Medications    Modified Medication Previous Medication    ALBUTEROL (PROAIR HFA) 90 MCG/ACTUATION INHALER albuterol (PROAIR HFA) 90 mcg/actuation inhaler       Inhale 2 puffs into the lungs every 4 (four) hours as needed for Wheezing or Shortness of Breath.    Inhale 2 puffs into the lungs every 4 (four) hours as needed for Wheezing or Shortness of Breath.    DIAZEPAM (VALIUM) 10 MG TAB diazePAM (VALIUM) 10 MG Tab       Half in AM, one in PM    Half in AM, one in PM       Hematuria, unspecified type  -     Ambulatory consult to Nephrology  -     Urinalysis; Future  -     Urine culture; Future    Hypokalemia  -     Ambulatory consult to Nephrology    Elevated blood protein  -     Comprehensive metabolic panel; Future; Expected date: 03/14/2018    Mild intermittent asthma, unspecified whether complicated    Other orders  -     cholecalciferol, vitamin D3, (VITAMIN D3) 5,000 unit Tab; Take 5,000 Units by mouth once daily.  Dispense: 90 tablet; Refill: 3  -     pyridoxine, vitamin B6, (B-6) 50 MG Tab; Take 1 tablet (50 mg total) by mouth once daily.   Dispense: 100 tablet; Refill: 11  -     albuterol (PROAIR HFA) 90 mcg/actuation inhaler; Inhale 2 puffs into the lungs every 4 (four) hours as needed for Wheezing or Shortness of Breath.  Dispense: 18 g; Refill: 3  -     diazePAM (VALIUM) 10 MG Tab; Half in AM, one in PM  Dispense: 45 tablet; Refill: 2

## 2018-04-03 RX ORDER — ESTROGENS, CONJUGATED 1.25 MG
TABLET ORAL
Qty: 30 TABLET | Refills: 1 | Status: ON HOLD | OUTPATIENT
Start: 2018-04-03 | End: 2022-08-30 | Stop reason: HOSPADM

## 2018-04-12 ENCOUNTER — LAB VISIT (OUTPATIENT)
Dept: LAB | Facility: HOSPITAL | Age: 48
End: 2018-04-12
Attending: FAMILY MEDICINE
Payer: MEDICAID

## 2018-04-12 DIAGNOSIS — R31.9 HEMATURIA, UNSPECIFIED TYPE: ICD-10-CM

## 2018-04-12 LAB
BILIRUB UR QL STRIP: NEGATIVE
CLARITY UR REFRACT.AUTO: CLEAR
COLOR UR AUTO: YELLOW
GLUCOSE UR QL STRIP: NEGATIVE
HGB UR QL STRIP: NEGATIVE
KETONES UR QL STRIP: NEGATIVE
LEUKOCYTE ESTERASE UR QL STRIP: NEGATIVE
NITRITE UR QL STRIP: NEGATIVE
PH UR STRIP: 7 [PH] (ref 5–8)
PROT UR QL STRIP: NEGATIVE
SP GR UR STRIP: 1.01 (ref 1–1.03)
URN SPEC COLLECT METH UR: NORMAL
UROBILINOGEN UR STRIP-ACNC: NEGATIVE EU/DL

## 2018-04-12 PROCEDURE — 87086 URINE CULTURE/COLONY COUNT: CPT | Mod: PO

## 2018-04-12 PROCEDURE — 81003 URINALYSIS AUTO W/O SCOPE: CPT | Mod: PO

## 2018-04-13 ENCOUNTER — OFFICE VISIT (OUTPATIENT)
Dept: FAMILY MEDICINE | Facility: CLINIC | Age: 48
End: 2018-04-13
Payer: MEDICAID

## 2018-04-13 VITALS
HEIGHT: 62 IN | SYSTOLIC BLOOD PRESSURE: 90 MMHG | WEIGHT: 127 LBS | DIASTOLIC BLOOD PRESSURE: 68 MMHG | BODY MASS INDEX: 23.37 KG/M2

## 2018-04-13 DIAGNOSIS — E16.2 HYPOGLYCEMIA: ICD-10-CM

## 2018-04-13 PROCEDURE — 99213 OFFICE O/P EST LOW 20 MIN: CPT | Mod: S$GLB,,, | Performed by: FAMILY MEDICINE

## 2018-04-13 RX ORDER — AMLODIPINE BESYLATE 10 MG/1
5 TABLET ORAL DAILY
Qty: 45 TABLET | Refills: 1 | Status: ON HOLD | OUTPATIENT
Start: 2018-04-13 | End: 2022-08-30 | Stop reason: HOSPADM

## 2018-04-13 RX ORDER — MIRTAZAPINE 15 MG/1
1 TABLET, FILM COATED ORAL NIGHTLY
COMMUNITY
Start: 2018-04-04

## 2018-04-14 LAB
BACTERIA UR CULT: NORMAL
BACTERIA UR CULT: NORMAL

## 2018-04-16 ENCOUNTER — TELEPHONE (OUTPATIENT)
Dept: FAMILY MEDICINE | Facility: CLINIC | Age: 48
End: 2018-04-16

## 2018-04-16 NOTE — TELEPHONE ENCOUNTER
----- Message from Elieser Rollins MD sent at 4/15/2018  9:20 PM CDT -----  CALL PT TESTS ARE NORMAL

## 2018-04-28 ENCOUNTER — HOSPITAL ENCOUNTER (EMERGENCY)
Facility: HOSPITAL | Age: 48
Discharge: HOME | End: 2018-04-28
Attending: FAMILY MEDICINE
Payer: MEDICAID

## 2018-04-28 VITALS
TEMPERATURE: 98 F | OXYGEN SATURATION: 100 % | WEIGHT: 125 LBS | SYSTOLIC BLOOD PRESSURE: 105 MMHG | BODY MASS INDEX: 22.86 KG/M2 | HEART RATE: 101 BPM | RESPIRATION RATE: 18 BRPM | DIASTOLIC BLOOD PRESSURE: 77 MMHG

## 2018-04-28 DIAGNOSIS — S80.11XA LEG HEMATOMA, RIGHT, INITIAL ENCOUNTER: ICD-10-CM

## 2018-04-28 DIAGNOSIS — T14.90XA INJURY: ICD-10-CM
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
## 2018-04-28 PROCEDURE — 96372 THER/PROPH/DIAG INJ SC/IM: CPT

## 2018-04-28 PROCEDURE — 63600175 PHARM REV CODE 636 W HCPCS: Performed by: PHYSICIAN ASSISTANT

## 2018-04-28 PROCEDURE — 99283 EMERGENCY DEPT VISIT LOW MDM: CPT | Mod: 25

## 2018-04-28 RX ORDER — KETOROLAC TROMETHAMINE 30 MG/ML
30 INJECTION, SOLUTION INTRAMUSCULAR; INTRAVENOUS
Status: COMPLETED | OUTPATIENT
Start: 2018-04-28 | End: 2018-04-28

## 2018-04-28 RX ADMIN — KETOROLAC TROMETHAMINE 30 MG: 30 INJECTION, SOLUTION INTRAMUSCULAR at 06:04

## 2018-04-28 NOTE — ED PROVIDER NOTES
Encounter Date: 2018       History     Chief Complaint   Patient presents with    Leg Injury     tripped on the steps today now with right leg pain, hematoma seen to right leg. also states back pain that startes yesterday    Back Pain     Patient presents with slip and fall today with pain and swelling to her right leg. Patient denies CP or SOB prior to the fall. She denies hitting her head or LOC. She denies numbness or tingling to her leg.           Review of patient's allergies indicates:   Allergen Reactions    Bactrim [sulfamethoxazole-trimethoprim] Itching    Tramadol     Vancomycin analogues Rash     Past Medical History:   Diagnosis Date    B12 deficiency anemia     Chronic pain     CKD (chronic kidney disease) stage 3, GFR 30-59 ml/min     Depression     Epilepsy     Hematuria     Hypertension     Hypokalemia     Insomnia     Migraines     Proteinuria      Past Surgical History:   Procedure Laterality Date    APPENDECTOMY      BELT ABDOMINOPLASTY      breast implants  2013    BREAST SURGERY Bilateral          SECTION, LOW TRANSVERSE      x3    CHOLECYSTECTOMY      GASTRIC BYPASS      HYSTERECTOMY      REDUCTION MAMMAPLASTY      reduction and implants    TONSILLECTOMY, ADENOIDECTOMY       Family History   Problem Relation Age of Onset    Diabetes Mother     Hypertension Mother     Heart disease Mother     Cancer Mother      breast    Breast cancer Mother     Kidney disease Father     No Known Problems Sister     No Known Problems Brother     No Known Problems Daughter     No Known Problems Son     Colon cancer Neg Hx     Ovarian cancer Neg Hx      Social History   Substance Use Topics    Smoking status: Never Smoker    Smokeless tobacco: Never Used    Alcohol use No     Review of Systems   Constitutional: Negative for chills and fever.   Respiratory: Negative for cough, chest tightness and shortness of breath.    Cardiovascular: Negative for chest  pain.   Gastrointestinal: Negative for abdominal pain, nausea and vomiting.   Musculoskeletal: Positive for myalgias.   Neurological: Negative for dizziness, syncope, weakness, light-headedness and headaches.       Physical Exam     Initial Vitals [04/28/18 1730]   BP Pulse Resp Temp SpO2   105/77 101 18 98 °F (36.7 °C) 100 %      MAP       86.33         Physical Exam    Nursing note and vitals reviewed.  Constitutional: She appears well-developed and well-nourished.   HENT:   Head: Normocephalic and atraumatic.   Eyes: EOM are normal. Pupils are equal, round, and reactive to light.   Neck: Normal range of motion. Neck supple.   Cardiovascular: Normal rate and regular rhythm.   Pulmonary/Chest: Breath sounds normal. No respiratory distress.   Abdominal: Soft. Bowel sounds are normal. There is no tenderness.   Musculoskeletal: Normal range of motion.        Legs:  Contusion with hematoma noted to anterior lower leg. Pedal and posterior tibialis pulses wnl 2+, capillary refill wnl < 2 seconds. Full plantar and dorsiflexion   Neurological: She is alert and oriented to person, place, and time.   Skin: Skin is warm. Capillary refill takes less than 2 seconds.         ED Course   Procedures  Labs Reviewed - No data to display     Imaging Results          X-Ray Tibia Fibula 2 View Right (Final result)  Result time 04/28/18 18:07:49    Final result by Allison Hernandez MD (Timothy) (04/28/18 18:07:49)                 Impression:         No acute bony changes      Electronically signed by: ALLISON HERNANDEZ MD  Date:     04/28/18  Time:    18:07              Narrative:    Right tibia and fibula, 2 views    Clinical History:  Leg pain, traumatic    Findings:     There is  no acute bony or joint abnormality. No acute fracture or dislocation.Soft tissue swelling is noted distally on the anterior aspect of lower leg.                               Medical Decision Making:   Initial Assessment:   Patient presents with slip and fall  today with pain and swelling to her right leg. Patient denies CP or SOB prior to the fall. She denies hitting her head or LOC. She denies numbness or tingling to her leg. On exam, patient is found to have a hematoma to her lower leg without active bleeding.   Differential Diagnosis:   Right leg contusion, fall, right leg hematoma  ED Management:  Informed patient of negative Xray. Instructed to apply ice and take tylenol and ibuprofen for pain. Follow up with PCP. Patient is agreeable with plan.                       Clinical Impression:   The primary encounter diagnosis was Fall, initial encounter. Diagnoses of Injury and Leg hematoma, right, initial encounter were also pertinent to this visit.                           Aislinn Champion PA-C  04/28/18 [id number]

## 2018-04-30 NOTE — PROGRESS NOTES
Subjective:      Patient ID: Ayana Anthony is a 48 y.o. female.    Chief Complaint: Results (lab)      HPI   Here for lab results; pt has symtpoms of hypoglycemia but no recoreded low numbers;    Review of Systems   Constitutional: Negative.    HENT: Negative.    Respiratory: Negative.    Cardiovascular: Negative.    Gastrointestinal: Negative.    Endocrine: Negative.    Genitourinary: Negative.    Musculoskeletal: Negative.    Psychiatric/Behavioral: Negative.    All other systems reviewed and are negative.    Objective:     Physical Exam   Constitutional: She is oriented to person, place, and time. She appears well-developed and well-nourished.   HENT:   Head: Normocephalic.   Eyes: Conjunctivae and EOM are normal. Pupils are equal, round, and reactive to light.   Neck: Normal range of motion. Neck supple.   Cardiovascular: Normal rate, regular rhythm and normal heart sounds.    Pulmonary/Chest: Effort normal and breath sounds normal.   Musculoskeletal: Normal range of motion.   Neurological: She is alert and oriented to person, place, and time. She has normal reflexes.   Skin: Skin is warm and dry.   Psychiatric: She has a normal mood and affect. Her behavior is normal. Judgment and thought content normal.   Nursing note and vitals reviewed.    Assessment:     1. Hypoglycemia      Plan:     New Prescriptions    No medications on file     Discontinued Medications    CHOLECALCIFEROL, VITAMIN D3, (VITAMIN D3) 5,000 UNIT TAB    Take 5,000 Units by mouth once daily.    PYRIDOXINE, VITAMIN B6, (B-6) 250 MG TAB    Take 1 tablet (250 mg total) by mouth once daily.    SERTRALINE (ZOLOFT) 100 MG TABLET    Take 2 tablets (200 mg total) by mouth once daily.    TRAZODONE (DESYREL) 150 MG TABLET    Take 1 tablet (150 mg total) by mouth nightly as needed for Insomnia.     Modified Medications    Modified Medication Previous Medication    AMLODIPINE (NORVASC) 10 MG TABLET amLODIPine (NORVASC) 10 MG tablet       Take 0.5 tablets  (5 mg total) by mouth once daily.    Take 1 tablet (10 mg total) by mouth once daily.    BLOOD SUGAR DIAGNOSTIC STRP blood sugar diagnostic Strp       1 strip by Misc.(Non-Drug; Combo Route) route 3 (three) times daily.    1 strip by Misc.(Non-Drug; Combo Route) route 3 (three) times daily.       Hypoglycemia  -     blood sugar diagnostic Strp; 1 strip by Misc.(Non-Drug; Combo Route) route 3 (three) times daily.  Dispense: 100 strip; Refill: 0    Other orders  -     amLODIPine (NORVASC) 10 MG tablet; Take 0.5 tablets (5 mg total) by mouth once daily.  Dispense: 45 tablet; Refill: 1

## 2018-05-14 ENCOUNTER — OFFICE VISIT (OUTPATIENT)
Dept: FAMILY MEDICINE | Facility: CLINIC | Age: 48
End: 2018-05-14
Payer: MEDICAID

## 2018-05-14 ENCOUNTER — TELEPHONE (OUTPATIENT)
Dept: FAMILY MEDICINE | Facility: CLINIC | Age: 48
End: 2018-05-14

## 2018-05-14 VITALS
HEART RATE: 103 BPM | HEIGHT: 62 IN | SYSTOLIC BLOOD PRESSURE: 118 MMHG | TEMPERATURE: 100 F | DIASTOLIC BLOOD PRESSURE: 76 MMHG | WEIGHT: 128.38 LBS | OXYGEN SATURATION: 96 % | BODY MASS INDEX: 23.62 KG/M2

## 2018-05-14 DIAGNOSIS — L60.0 INGROWN RIGHT BIG TOENAIL: ICD-10-CM

## 2018-05-14 PROCEDURE — 99213 OFFICE O/P EST LOW 20 MIN: CPT | Mod: S$GLB,,, | Performed by: FAMILY MEDICINE

## 2018-05-14 RX ORDER — CEPHALEXIN 500 MG/1
500 CAPSULE ORAL 3 TIMES DAILY
Qty: 30 CAPSULE | Refills: 0 | Status: SHIPPED | OUTPATIENT
Start: 2018-05-14 | End: 2018-05-24

## 2018-05-14 NOTE — TELEPHONE ENCOUNTER
----- Message from Brea Walters sent at 5/14/2018  3:04 PM CDT -----  Contact: Self / [phone]  Patient would like to be seen today.   DECLINED Lisette    Symptoms: Big toe/swollen/pus    How long has patient had these symptoms: 3-4 days    If unable to be seen , can something be called into patient pharmacy?no.. Want appt    Pharmacy name: [facility]    Any drug allergies: none known

## 2018-05-14 NOTE — PROGRESS NOTES
Subjective:      Patient ID: Ayana Anthony is a 48 y.o. female.    Chief Complaint: Recurrent Skin Infections (Toe pain & infection )      HPI   Had a seizure, banged right leg, now infected ingrown toenail on right medial   Review of Systems   Skin: Positive for wound.        Right big toenail infected  Contused right tibia with seizure   Neurological: Positive for seizures.   All other systems reviewed and are negative.    Objective:     Physical Exam   Constitutional: She is oriented to person, place, and time. She appears well-developed and well-nourished.   HENT:   Head: Normocephalic.   Eyes: Conjunctivae and EOM are normal. Pupils are equal, round, and reactive to light.   Neck: Normal range of motion. Neck supple.   Pulmonary/Chest: Effort normal.   Musculoskeletal: Normal range of motion.   Neurological: She is alert and oriented to person, place, and time. She has normal reflexes.   Skin: Skin is warm and dry.   irght big toe with ingrown nail   Psychiatric: She has a normal mood and affect. Her behavior is normal. Judgment and thought content normal.   Nursing note and vitals reviewed.    Assessment:     1. Ingrown right big toenail      Plan:     New Prescriptions    No medications on file     Discontinued Medications    No medications on file     Modified Medications    No medications on file       Ingrown right big toenail    Other orders  -     cephALEXin (KEFLEX) 500 MG capsule; Take 1 capsule (500 mg total) by mouth 3 (three) times daily.  Dispense: 30 capsule; Refill: 0    Keflex 500 tid , RTC Thursday to remove part of nail, epsom salt soaks bid, to ER if gets worse

## 2018-05-17 ENCOUNTER — OFFICE VISIT (OUTPATIENT)
Dept: FAMILY MEDICINE | Facility: CLINIC | Age: 48
End: 2018-05-17
Payer: MEDICAID

## 2018-05-17 VITALS
BODY MASS INDEX: 23.25 KG/M2 | DIASTOLIC BLOOD PRESSURE: 70 MMHG | OXYGEN SATURATION: 99 % | HEIGHT: 62 IN | TEMPERATURE: 99 F | HEART RATE: 115 BPM | SYSTOLIC BLOOD PRESSURE: 122 MMHG | WEIGHT: 126.38 LBS

## 2018-05-17 DIAGNOSIS — L60.0 INGROWN RIGHT BIG TOENAIL: Primary | ICD-10-CM

## 2018-05-17 PROCEDURE — 99212 OFFICE O/P EST SF 10 MIN: CPT | Mod: 25,S$GLB,, | Performed by: FAMILY MEDICINE

## 2018-05-17 PROCEDURE — 11730 AVULSION NAIL PLATE SIMPLE 1: CPT | Mod: S$GLB,,, | Performed by: FAMILY MEDICINE

## 2018-05-17 NOTE — PROCEDURES
Nail Removal  Date/Time: 5/17/2018 5:31 PM  Performed by: DORYS ADAMS  Authorized by: DORYS ADAMS     Consent Done?:  Yes (Verbal)    Location:  Right foot  Location detail:  Right big toe  Anesthesia:  Digital block  Local anesthetic: lidocaine 1% without epinephrine  Anesthetic total (ml):  2  Preparation:  Skin prepped with Betadine    Amount removed:  1/3  Side:  Radial  Wedge excision of skin of nail fold: No    Nail bed sutured?: No    Nail matrix removed:  None  Removed nail replaced and anchored: No    Dressing applied:  Petrolatum-impregnated gauze and tube gauze  Patient tolerance:  Patient tolerated the procedure well with no immediate complications

## 2018-05-17 NOTE — PROGRESS NOTES
Subjective:      Patient ID: Ayana Anthony is a 48 y.o. female.    Chief Complaint: Pain (toe nail removal rt great toe)      HPI   Right big toenail infected and here to remove the edge; less swollen less drainage on abx, keflex and has been soaking    Review of Systems   Skin:        Right big toenail infected     Objective:     Physical Exam   Skin:   Medial aspect right big toenail ingrown     Assessment:     1. Ingrown right big toenail      Plan:     New Prescriptions    No medications on file     Discontinued Medications    No medications on file     Modified Medications    No medications on file       Ingrown right big toenail

## 2018-06-05 NOTE — TELEPHONE ENCOUNTER
----- Message from Brea Walters sent at 6/5/2018  2:44 PM CDT -----  Contact: Self / [phone]  Patient is requesting a medication refill.     RX name: oxycodone  Strength: 30 mg  Quantity: 100 pills  Directions: Take 1 tablet (30 mg total) by mouth 4 (four) times daily    Pharmacy name: [facility]      Phone number where patient can be reached: [phone]

## 2018-06-07 RX ORDER — OXYCODONE HYDROCHLORIDE 30 MG/1
30 TABLET ORAL 4 TIMES DAILY
Qty: 100 TABLET | Refills: 0 | OUTPATIENT
Start: 2018-06-07

## 2018-06-14 ENCOUNTER — TELEPHONE (OUTPATIENT)
Dept: FAMILY MEDICINE | Facility: CLINIC | Age: 48
End: 2018-06-14

## 2018-06-14 RX ORDER — DIAZEPAM 10 MG/1
TABLET ORAL
Qty: 45 TABLET | Refills: 2 | Status: CANCELLED | OUTPATIENT
Start: 2018-06-14

## 2018-06-14 NOTE — TELEPHONE ENCOUNTER
----- Message from Nydia Young sent at 6/14/2018  8:44 AM CDT -----  Patient would like to be seen today.     Symptoms: sore throat, headache; diarrhea; vomitting    How long has patient had these symptoms: 2 days  OTC diarrhea medication    If unable to be seen , can something be called into patient pharmacy? YES    Pharmacy name: [facility]    Any drug allergies:

## 2018-06-15 ENCOUNTER — OFFICE VISIT (OUTPATIENT)
Dept: FAMILY MEDICINE | Facility: CLINIC | Age: 48
End: 2018-06-15
Payer: MEDICAID

## 2018-06-15 VITALS
BODY MASS INDEX: 22.67 KG/M2 | HEIGHT: 62 IN | TEMPERATURE: 99 F | WEIGHT: 123.19 LBS | DIASTOLIC BLOOD PRESSURE: 62 MMHG | SYSTOLIC BLOOD PRESSURE: 138 MMHG | HEART RATE: 101 BPM | OXYGEN SATURATION: 96 %

## 2018-06-15 DIAGNOSIS — M54.9 BACK PAIN, UNSPECIFIED BACK LOCATION, UNSPECIFIED BACK PAIN LATERALITY, UNSPECIFIED CHRONICITY: ICD-10-CM

## 2018-06-15 DIAGNOSIS — K57.92 DIVERTICULITIS: Primary | ICD-10-CM

## 2018-06-15 DIAGNOSIS — M54.2 CERVICALGIA: ICD-10-CM

## 2018-06-15 DIAGNOSIS — M54.2 NECK PAIN: ICD-10-CM

## 2018-06-15 DIAGNOSIS — G89.29 OTHER CHRONIC PAIN: ICD-10-CM

## 2018-06-15 PROCEDURE — 99213 OFFICE O/P EST LOW 20 MIN: CPT | Mod: S$GLB,,, | Performed by: FAMILY MEDICINE

## 2018-06-15 RX ORDER — DIAZEPAM 10 MG/1
TABLET ORAL
Qty: 45 TABLET | Refills: 2 | Status: SHIPPED | OUTPATIENT
Start: 2018-06-15 | End: 2018-07-19 | Stop reason: SDDI

## 2018-06-15 RX ORDER — SERTRALINE HYDROCHLORIDE 100 MG/1
200 TABLET, FILM COATED ORAL DAILY
Refills: 5 | COMMUNITY
Start: 2018-06-04 | End: 2022-09-09

## 2018-06-15 RX ORDER — PROMETHAZINE HYDROCHLORIDE 25 MG/1
25 TABLET ORAL EVERY 4 HOURS
Qty: 12 TABLET | Refills: 0 | Status: ON HOLD | OUTPATIENT
Start: 2018-06-15 | End: 2022-08-30 | Stop reason: SDUPTHER

## 2018-06-15 RX ORDER — HYDROCHLOROTHIAZIDE 25 MG/1
25 TABLET ORAL DAILY
Refills: 3 | Status: ON HOLD | COMMUNITY
Start: 2018-04-11 | End: 2022-08-25 | Stop reason: SDUPTHER

## 2018-06-15 RX ORDER — METRONIDAZOLE 500 MG/1
500 TABLET ORAL EVERY 12 HOURS
Qty: 14 TABLET | Refills: 0 | Status: ON HOLD | OUTPATIENT
Start: 2018-06-15 | End: 2022-08-25 | Stop reason: HOSPADM

## 2018-06-15 NOTE — PROGRESS NOTES
Subjective:      Patient ID: Ayana Anthony is a 48 y.o. female.    Chief Complaint: Emesis (all x 3 days); Diarrhea; Headache; and Back Pain      HPI   For 3 days, N,V, D; no blood; ? Fever; Hx of gastric bypass, hyst, kenna, GB, 3 C/S; took immodium;  not sick; never had colonoscopy or upper EGD; no melena;  Needs pain management referral for chronic pain from neck and back and tailbone    Review of Systems   Constitutional: Negative.    HENT: Negative.    Respiratory: Negative.    Cardiovascular: Negative.    Gastrointestinal: Positive for abdominal pain, diarrhea, nausea and vomiting.   Endocrine: Negative.    Genitourinary: Negative.    Musculoskeletal: Negative.    Psychiatric/Behavioral: Negative.      Objective:     Physical Exam   Constitutional: She is oriented to person, place, and time. She appears well-developed and well-nourished.   HENT:   Head: Normocephalic.   Eyes: Conjunctivae and EOM are normal. Pupils are equal, round, and reactive to light.   Neck: Normal range of motion. Neck supple.   Cardiovascular: Normal rate, regular rhythm and normal heart sounds.    Pulmonary/Chest: Effort normal and breath sounds normal.   Musculoskeletal: Normal range of motion.   Neurological: She is alert and oriented to person, place, and time. She has normal reflexes.   Skin: Skin is warm and dry.   Psychiatric: She has a normal mood and affect. Her behavior is normal. Judgment and thought content normal.   Nursing note and vitals reviewed.    Assessment:     1. Diverticulitis    2. Other chronic pain    3. Cervicalgia    4. Neck pain    5. Back pain, unspecified back location, unspecified back pain laterality, unspecified chronicity      Plan:     New Prescriptions    METRONIDAZOLE (FLAGYL) 500 MG TABLET    Take 1 tablet (500 mg total) by mouth every 12 (twelve) hours.     Discontinued Medications    No medications on file     Modified Medications    No medications on file       Diverticulitis  -      Ambulatory referral to Gastroenterology    Other chronic pain  -     Ambulatory referral to Pain Clinic    Cervicalgia  -     Ambulatory referral to Pain Clinic    Neck pain  -     Ambulatory referral to Pain Clinic    Back pain, unspecified back location, unspecified back pain laterality, unspecified chronicity  -     Ambulatory referral to Pain Clinic    Other orders  -     metroNIDAZOLE (FLAGYL) 500 MG tablet; Take 1 tablet (500 mg total) by mouth every 12 (twelve) hours.  Dispense: 14 tablet; Refill: 0

## 2018-06-29 ENCOUNTER — HOSPITAL ENCOUNTER (EMERGENCY)
Facility: HOSPITAL | Age: 48
Discharge: HOME | End: 2018-06-29
Attending: EMERGENCY MEDICINE
Payer: MEDICAID

## 2018-06-29 VITALS
HEIGHT: 62 IN | OXYGEN SATURATION: 97 % | SYSTOLIC BLOOD PRESSURE: 133 MMHG | RESPIRATION RATE: 20 BRPM | HEART RATE: 115 BPM | TEMPERATURE: 98 F | DIASTOLIC BLOOD PRESSURE: 74 MMHG | BODY MASS INDEX: 21.71 KG/M2 | WEIGHT: 118 LBS

## 2018-06-29 DIAGNOSIS — F11.20 OPIATE DEPENDENCE, CONTINUOUS: ICD-10-CM

## 2018-06-29 DIAGNOSIS — M54.31 SCIATICA OF RIGHT SIDE: Primary | ICD-10-CM

## 2018-06-29 PROCEDURE — 63600175 PHARM REV CODE 636 W HCPCS: Performed by: EMERGENCY MEDICINE

## 2018-06-29 PROCEDURE — 99283 EMERGENCY DEPT VISIT LOW MDM: CPT

## 2018-06-29 PROCEDURE — 96372 THER/PROPH/DIAG INJ SC/IM: CPT

## 2018-06-29 RX ORDER — METHYLPREDNISOLONE 4 MG/1
TABLET ORAL
Qty: 1 PACKAGE | Refills: 0 | Status: SHIPPED | OUTPATIENT
Start: 2018-06-29 | End: 2018-07-20

## 2018-06-29 RX ORDER — KETOROLAC TROMETHAMINE 30 MG/ML
60 INJECTION, SOLUTION INTRAMUSCULAR; INTRAVENOUS
Status: COMPLETED | OUTPATIENT
Start: 2018-06-29 | End: 2018-06-29

## 2018-06-29 RX ORDER — KETOROLAC TROMETHAMINE 10 MG/1
10 TABLET, FILM COATED ORAL EVERY 6 HOURS
Qty: 15 TABLET | Refills: 0 | Status: ON HOLD | OUTPATIENT
Start: 2018-06-29 | End: 2022-08-25 | Stop reason: HOSPADM

## 2018-06-29 RX ORDER — DEXAMETHASONE SODIUM PHOSPHATE 4 MG/ML
8 INJECTION, SOLUTION INTRA-ARTICULAR; INTRALESIONAL; INTRAMUSCULAR; INTRAVENOUS; SOFT TISSUE
Status: COMPLETED | OUTPATIENT
Start: 2018-06-29 | End: 2018-06-29

## 2018-06-29 RX ADMIN — KETOROLAC TROMETHAMINE 60 MG: 30 INJECTION, SOLUTION INTRAMUSCULAR at 06:06

## 2018-06-29 RX ADMIN — DEXAMETHASONE SODIUM PHOSPHATE 8 MG: 4 INJECTION, SOLUTION INTRAMUSCULAR; INTRAVENOUS at 06:06

## 2018-06-29 NOTE — ED PROVIDER NOTES
Encounter Date: 2018       History     Chief Complaint   Patient presents with    Back Pain     Pt reports lower and upper back pain after slipping and falling yesterday     The history is provided by the patient.   Fall   The accident occurred yesterday. The fall occurred while walking. She fell from a height of 1 to 2 ft. She landed on a hard floor. The point of impact was the buttocks. The pain is present in the back. The pain is at a severity of 5/10. She was ambulatory at the scene. There was no entrapment after the fall. There was drug use involved in the accident. There was no alcohol use involved in the accident. Associated symptoms include back pain. Pertinent negatives include no neck pain, no paresthesias, no paralysis, no abdominal pain, no bowel incontinence, no nausea and no vomiting.     Review of patient's allergies indicates:   Allergen Reactions    Bactrim [sulfamethoxazole-trimethoprim] Itching    Tramadol     Vancomycin analogues Rash     Past Medical History:   Diagnosis Date    B12 deficiency anemia     Chronic pain     CKD (chronic kidney disease) stage 3, GFR 30-59 ml/min     Depression     Epilepsy     Hematuria     Hypertension     Hypokalemia     Insomnia     Migraines     Proteinuria      Past Surgical History:   Procedure Laterality Date    APPENDECTOMY      BELT ABDOMINOPLASTY      breast implants  2013    BREAST SURGERY Bilateral          SECTION, LOW TRANSVERSE      x3    CHOLECYSTECTOMY      GASTRIC BYPASS      HYSTERECTOMY      REDUCTION MAMMAPLASTY      reduction and implants    TONSILLECTOMY, ADENOIDECTOMY       Family History   Problem Relation Age of Onset    Diabetes Mother     Hypertension Mother     Heart disease Mother     Cancer Mother         breast    Breast cancer Mother     Kidney disease Father     No Known Problems Sister     No Known Problems Brother     No Known Problems Daughter     No Known Problems Son      Colon cancer Neg Hx     Ovarian cancer Neg Hx      Social History   Substance Use Topics    Smoking status: Never Smoker    Smokeless tobacco: Never Used    Alcohol use No     Review of Systems   Gastrointestinal: Negative for abdominal pain, bowel incontinence, nausea and vomiting.   Musculoskeletal: Positive for back pain. Negative for neck pain.   Neurological: Negative for paresthesias.   All other systems reviewed and are negative.      Physical Exam     Initial Vitals [06/29/18 1722]   BP Pulse Resp Temp SpO2   133/74 (!) 115 20 98.3 °F (36.8 °C) 97 %      MAP       --         Physical Exam    Nursing note and vitals reviewed.  Constitutional: She appears well-developed and well-nourished.   HENT:   Head: Normocephalic and atraumatic.   Eyes: Conjunctivae and EOM are normal.   Neck: Normal range of motion. Neck supple.   Cardiovascular: Normal rate, regular rhythm and normal heart sounds.   Pulmonary/Chest: Breath sounds normal. She has no wheezes. She has no rhonchi. She has no rales.   Abdominal: Soft. There is no tenderness. There is no rebound and no guarding.   Musculoskeletal: Normal range of motion.        Back:    Neurological: She is alert and oriented to person, place, and time.   Skin: Skin is warm and dry. Capillary refill takes less than 2 seconds.   Psychiatric: She has a normal mood and affect. Her behavior is normal. Judgment and thought content normal.         ED Course   Procedures  Labs Reviewed - No data to display       Imaging Results    None          Medical Decision Making:   ED Management:  Patient is an active prescription of narcotics.  I have explained to the patient that she will not get narcotics here.  She is currently receiving transdermal pain medications via [facility].  Patient also has an extensive narcotic use history according to the  web site.                      Clinical Impression:   The primary encounter diagnosis was Sciatica of right side. A diagnosis of Opiate  dependence, continuous was also pertinent to this visit.      Disposition:   Disposition: Discharged  Condition: Stable                        Paige Vo MD  06/29/18 [id number]

## 2018-07-02 ENCOUNTER — HOSPITAL ENCOUNTER (EMERGENCY)
Facility: HOSPITAL | Age: 48
Discharge: HOME | End: 2018-07-02
Payer: MEDICAID

## 2018-07-02 DIAGNOSIS — M54.41 CHRONIC RIGHT-SIDED LOW BACK PAIN WITH RIGHT-SIDED SCIATICA: Primary | ICD-10-CM

## 2018-07-02 DIAGNOSIS — G89.29 CHRONIC RIGHT-SIDED LOW BACK PAIN WITH RIGHT-SIDED SCIATICA: Primary | ICD-10-CM

## 2018-07-02 PROCEDURE — 25000003 PHARM REV CODE 250: Performed by: PHYSICIAN ASSISTANT

## 2018-07-02 PROCEDURE — 99283 EMERGENCY DEPT VISIT LOW MDM: CPT

## 2018-07-02 RX ORDER — METHOCARBAMOL 500 MG/1
1000 TABLET, FILM COATED ORAL
Status: COMPLETED | OUTPATIENT
Start: 2018-07-02 | End: 2018-07-02

## 2018-07-02 RX ORDER — METHOCARBAMOL 500 MG/1
500 TABLET, FILM COATED ORAL 2 TIMES DAILY PRN
Qty: 14 TABLET | Refills: 0 | Status: SHIPPED | OUTPATIENT
Start: 2018-07-02 | End: 2018-07-09

## 2018-07-02 RX ADMIN — METHOCARBAMOL 1000 MG: 500 TABLET ORAL at 11:07

## 2018-07-02 NOTE — DISCHARGE INSTRUCTIONS
Take prescribed med as directed as needed for discomfort.  Apply warm compresses and gentle stretching to assist.  Follow up with PCP at appointment already scheduled 1 week from now.
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
## 2018-07-02 NOTE — ED TRIAGE NOTES
Pt reports continued sciatica pain to right side. Pt seen here 3 days ago and given steroid shot. States pain was better for a day but has returned. Pt has chronic lower back and sciatica pain. States her pain management doctor was arrested and she has been unable to find another one. Denies any recent falls or injury.

## 2018-07-19 ENCOUNTER — TELEPHONE (OUTPATIENT)
Dept: FAMILY MEDICINE | Facility: CLINIC | Age: 48
End: 2018-07-19

## 2018-07-19 NOTE — TELEPHONE ENCOUNTER
Refills have been cx'd.   I spoke to the pt's .    He said that he will discuss it with her new DrJose As well.
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
## 2018-07-19 NOTE — TELEPHONE ENCOUNTER
Pt's  states that he is concerned that she has refills left on her bottle from Dr Rollins.     She is abusing the Valium.

## 2018-10-19 RX ORDER — HYDROCHLOROTHIAZIDE 25 MG/1
TABLET ORAL
Qty: 90 TABLET | Refills: 3 | OUTPATIENT
Start: 2018-10-19

## 2018-10-22 RX ORDER — AMLODIPINE BESYLATE 10 MG/1
TABLET ORAL
Qty: 30 TABLET | Refills: 1 | OUTPATIENT
Start: 2018-10-22

## 2018-10-22 RX ORDER — ESTROGENS, CONJUGATED 1.25 MG
TABLET ORAL
Qty: 30 TABLET | Refills: 1 | OUTPATIENT
Start: 2018-10-22

## 2018-10-22 RX ORDER — SERTRALINE HYDROCHLORIDE 100 MG/1
TABLET, FILM COATED ORAL
Qty: 60 TABLET | Refills: 5 | OUTPATIENT
Start: 2018-10-22

## 2018-10-22 RX ORDER — HYDROCHLOROTHIAZIDE 25 MG/1
TABLET ORAL
Qty: 30 TABLET | Refills: 1 | OUTPATIENT
Start: 2018-10-22

## 2019-02-13 DIAGNOSIS — Z12.31 VISIT FOR SCREENING MAMMOGRAM: Primary | ICD-10-CM

## 2019-02-28 RX ORDER — CYANOCOBALAMIN 1000 UG/ML
INJECTION, SOLUTION INTRAMUSCULAR; SUBCUTANEOUS
Qty: 10 ML | Refills: 2 | OUTPATIENT
Start: 2019-02-28

## 2019-03-09 RX ORDER — CYANOCOBALAMIN 1000 UG/ML
INJECTION, SOLUTION INTRAMUSCULAR; SUBCUTANEOUS
Qty: 10 ML | Refills: 2 | OUTPATIENT
Start: 2019-03-09

## 2019-10-02 PROBLEM — R31.9 HEMATURIA: Status: ACTIVE | Noted: 2018-03-14

## 2022-08-09 ENCOUNTER — TELEPHONE (OUTPATIENT)
Dept: HEMATOLOGY/ONCOLOGY | Facility: CLINIC | Age: 52
End: 2022-08-09
Payer: MEDICARE

## 2022-08-09 ENCOUNTER — TELEPHONE (OUTPATIENT)
Dept: ENDOCRINOLOGY | Facility: CLINIC | Age: 52
End: 2022-08-09
Payer: MEDICARE

## 2022-08-09 DIAGNOSIS — D50.9 IRON DEFICIENCY ANEMIA: Primary | ICD-10-CM

## 2022-08-09 NOTE — NURSING
Spoke to Mrs Anthony regarding referral to Hematology appt scheduled time and location confirmed she verbalized understanding   Oncology Navigation   Intake  Cancer Type: Benign hem (ORQUIDEA)  Internal / External Referral: External (FAX)  Referral Source: Steve Oconnell NP  Date of Referral: 8/9/2022  Initial Nurse Navigator Contact: 8/9/2022  Referral to Initial Contact Timeline (days): 0  Date Worked: 8/9/2022  First Appointment Available: 8/10/2022  Appointment Date: 8/10/2022 (Dr. Choudhury)  First Available Date vs. Scheduled Date (days): 0     Treatment                              Acuity      Follow Up  No follow-ups on file.

## 2022-08-09 NOTE — TELEPHONE ENCOUNTER
----- Message from Susanna Vasquez RN sent at 8/9/2022  2:43 PM CDT -----  Thank you!   ----- Message -----  From: Caitlyn Schilling, Patient Care Assistant  Sent: 8/9/2022   1:45 PM CDT  To: Macey Perla Staff- [place]    Type: Needs Medical Advice  Who Called:  brittani Norwood Call Back Number: [phone]    Additional Information: patient states she needs set up with above providers, she is really anemia , please call to further discuss, thank you

## 2022-08-10 ENCOUNTER — OFFICE VISIT (OUTPATIENT)
Dept: HEMATOLOGY/ONCOLOGY | Facility: CLINIC | Age: 52
End: 2022-08-10
Payer: MEDICARE

## 2022-08-10 ENCOUNTER — TELEPHONE (OUTPATIENT)
Dept: HEMATOLOGY/ONCOLOGY | Facility: CLINIC | Age: 52
End: 2022-08-10

## 2022-08-10 VITALS
TEMPERATURE: 97 F | BODY MASS INDEX: 23.53 KG/M2 | OXYGEN SATURATION: 99 % | HEIGHT: 62 IN | RESPIRATION RATE: 12 BRPM | WEIGHT: 127.88 LBS | SYSTOLIC BLOOD PRESSURE: 117 MMHG | DIASTOLIC BLOOD PRESSURE: 73 MMHG | HEART RATE: 116 BPM

## 2022-08-10 DIAGNOSIS — D50.9 IRON DEFICIENCY ANEMIA: ICD-10-CM

## 2022-08-10 DIAGNOSIS — D51.8 OTHER VITAMIN B12 DEFICIENCY ANEMIA: Primary | ICD-10-CM

## 2022-08-10 PROCEDURE — 99999 PR PBB SHADOW E&M-EST. PATIENT-LVL III: CPT | Mod: PBBFAC,,, | Performed by: INTERNAL MEDICINE

## 2022-08-10 PROCEDURE — 99205 OFFICE O/P NEW HI 60 MIN: CPT | Mod: S$PBB,,, | Performed by: INTERNAL MEDICINE

## 2022-08-10 PROCEDURE — 99213 OFFICE O/P EST LOW 20 MIN: CPT | Mod: PBBFAC,PO | Performed by: INTERNAL MEDICINE

## 2022-08-10 PROCEDURE — 99205 PR OFFICE/OUTPT VISIT, NEW, LEVL V, 60-74 MIN: ICD-10-PCS | Mod: S$PBB,,, | Performed by: INTERNAL MEDICINE

## 2022-08-10 PROCEDURE — 99999 PR PBB SHADOW E&M-EST. PATIENT-LVL III: ICD-10-PCS | Mod: PBBFAC,,, | Performed by: INTERNAL MEDICINE

## 2022-08-10 RX ORDER — SODIUM CHLORIDE 0.9 % (FLUSH) 0.9 %
10 SYRINGE (ML) INJECTION
Status: CANCELLED | OUTPATIENT
Start: 2022-08-10

## 2022-08-10 RX ORDER — HEPARIN 100 UNIT/ML
500 SYRINGE INTRAVENOUS
Status: CANCELLED | OUTPATIENT
Start: 2022-08-10

## 2022-08-10 RX ORDER — DIPHENHYDRAMINE HCL 25 MG
25 CAPSULE ORAL
Status: CANCELLED
Start: 2022-08-10

## 2022-08-10 RX ORDER — ACETAMINOPHEN 325 MG/1
650 TABLET ORAL
Status: CANCELLED
Start: 2022-08-10

## 2022-08-10 NOTE — PROGRESS NOTES
HPI    52 years old female with history of vitamin B12 deficiency and gastric bypass surgery chronic pain depression was referred to Hematology Clinic for evaluation iron deficiency anemia.  From outside facility demonstrate a hemoglobin 10.4 grams/deciliter, ferritin 9.62, iron 23, iron saturation 6%.  Patient denies any GI bleeding.    Patient complaining fatigue and tiredness.  Patient complaining chronic pain.      Past Medical History:   Diagnosis Date    B12 deficiency anemia     Chronic pain     CKD (chronic kidney disease) stage 3, GFR 30-59 ml/min     Depression     Epilepsy     Hematuria     Hypertension     Hypokalemia     Insomnia     Migraines     Proteinuria      Social History     Socioeconomic History    Marital status: Legally    Tobacco Use    Smoking status: Never Smoker    Smokeless tobacco: Never Used   Substance and Sexual Activity    Alcohol use: No    Drug use: No    Sexual activity: Yes     Partners: Male         Subjective          Objective    Physical Exam     Vitals:    08/10/22 1553   BP: 117/73   Pulse: (!) 116   Resp: 12   Temp: 97.1 °F (36.2 °C)         Awake alert no acute distress  Normocephalic atraumatic  Pupils equal round reactive  Soft nontender nondistended  Nonfocal cranial nerves 2-12 grossly intact sensorimotor grossly intact  Normal speech effort  Normal rate   defer  No rash no lesions    Lab Results   Component Value Date    WBC 9.76 03/04/2018    HGB 13.4 03/04/2018    HCT 39.3 03/04/2018    MCV 90 03/04/2018     03/04/2018       CMP  Sodium   Date Value Ref Range Status   04/12/2018 142 136 - 145 mmol/L Final     Potassium   Date Value Ref Range Status   04/12/2018 3.8 3.5 - 5.1 mmol/L Final     Chloride   Date Value Ref Range Status   04/12/2018 102 95 - 110 mmol/L Final     CO2   Date Value Ref Range Status   04/12/2018 27 23 - 29 mmol/L Final     Glucose   Date Value Ref Range Status   04/12/2018 91 70 - 110 mg/dL Final      BUN   Date Value Ref Range Status   04/12/2018 14 7 - 17 mg/dL Final     Creatinine   Date Value Ref Range Status   04/12/2018 0.83 0.50 - 1.40 mg/dL Final     Calcium   Date Value Ref Range Status   04/12/2018 9.0 8.7 - 10.5 mg/dL Final     Total Protein   Date Value Ref Range Status   04/12/2018 7.4 6.0 - 8.4 g/dL Final     Albumin   Date Value Ref Range Status   04/12/2018 3.9 3.5 - 5.2 g/dL Final     Total Bilirubin   Date Value Ref Range Status   04/12/2018 0.1 0.1 - 1.0 mg/dL Final     Comment:     For infants and newborns, interpretation of results should be based  on gestational age, weight and in agreement with clinical  observations.  Premature Infant recommended reference ranges:  Up to 24 hours.............<8.0 mg/dL  Up to 48 hours............<12.0 mg/dL  3-5 days..................<15.0 mg/dL  6-29 days.................<15.0 mg/dL       Alkaline Phosphatase   Date Value Ref Range Status   04/12/2018 72 38 - 126 U/L Final     AST   Date Value Ref Range Status   04/12/2018 21 15 - 46 U/L Final     ALT   Date Value Ref Range Status   04/12/2018 14 10 - 44 U/L Final     Anion Gap   Date Value Ref Range Status   04/12/2018 13 8 - 16 mmol/L Final     eGFR if    Date Value Ref Range Status   04/12/2018 >60.0 >60 mL/min/1.73 m^2 Final     eGFR if non    Date Value Ref Range Status   04/12/2018 >60.0 >60 mL/min/1.73 m^2 Final     Comment:     Calculation used to obtain the estimated glomerular filtration  rate (eGFR) is the CKD-EPI equation.          Assessment    History of anemia.  Patient is here to establish care.    Her last CBC was May 2021 which had hemoglobin 8.5 grams/deciliter.    Most recent blood work shows ferritin 9.62, iron 23, iron saturation 6% with hemoglobin of 10.4 grams/deciliter.  Patient denies any GI bleeding.  Patient denies any heavy menstrual cycles.  Patient does have history of gastric bypass surgeries.  Patient also reports that history of a  vitamin B12 deficiency which occasionally she gets vitamin B12 injections.    Plan    We will plan for Venofer 200 mg IV weekly x5 with premedication Tylenol and Benadryl.  After patient completed 5 treatments we will repeat the blood work for treatment response evaluation.  Patient may return to clinic after 5 treatments of IV iron.    There are no diagnoses linked to this encounter.

## 2022-08-10 NOTE — Clinical Note
IV iron therapy weekly x5 after completion 5 treatments I will see patient on week 6. With blood work.

## 2022-08-15 ENCOUNTER — TELEPHONE (OUTPATIENT)
Dept: HEMATOLOGY/ONCOLOGY | Facility: CLINIC | Age: 52
End: 2022-08-15
Payer: MEDICARE

## 2022-08-15 NOTE — TELEPHONE ENCOUNTER
This RN forwarded the patient's callback message to [facility] Scheduling to advise.       ----- Message from Eve Cerda sent at 8/15/2022 10:47 AM CDT -----  Contact: Patient  Type:  Needs Medical Advice    Who Called: Patient     Would the patient rather a call back or a response via [facility]? Call     Best Call Back Number: [phone] (home)     Additional Information: Calling about getting an iron IV infusion scheduled.     Please call to advise

## 2022-08-16 ENCOUNTER — TELEPHONE (OUTPATIENT)
Dept: HEMATOLOGY/ONCOLOGY | Facility: CLINIC | Age: 52
End: 2022-08-16
Payer: MEDICARE

## 2022-08-16 NOTE — TELEPHONE ENCOUNTER
Outgoing call to patient. Scheduled her next lab and follow-up appointment with Dr. Choudhury. Patient verbalized understanding of appointment dates/times

## 2022-08-18 ENCOUNTER — TELEPHONE (OUTPATIENT)
Dept: ENDOCRINOLOGY | Facility: CLINIC | Age: 52
End: 2022-08-18
Payer: MEDICARE

## 2022-08-19 ENCOUNTER — TELEPHONE (OUTPATIENT)
Dept: ENDOCRINOLOGY | Facility: CLINIC | Age: 52
End: 2022-08-19
Payer: MEDICARE

## 2022-08-19 NOTE — TELEPHONE ENCOUNTER
Called patient to see if she needed to reschedule her appt today no answer couldn't leave vm due to mailbox not being setup yet.

## 2022-08-22 ENCOUNTER — OFFICE VISIT (OUTPATIENT)
Dept: ENDOCRINOLOGY | Facility: CLINIC | Age: 52
End: 2022-08-22
Payer: MEDICARE

## 2022-08-22 ENCOUNTER — TELEPHONE (OUTPATIENT)
Dept: PULMONOLOGY | Facility: CLINIC | Age: 52
End: 2022-08-22

## 2022-08-22 ENCOUNTER — TELEPHONE (OUTPATIENT)
Dept: ENDOCRINOLOGY | Facility: CLINIC | Age: 52
End: 2022-08-22

## 2022-08-22 VITALS
OXYGEN SATURATION: 97 % | HEART RATE: 112 BPM | DIASTOLIC BLOOD PRESSURE: 70 MMHG | WEIGHT: 127.19 LBS | TEMPERATURE: 98 F | HEIGHT: 62 IN | SYSTOLIC BLOOD PRESSURE: 118 MMHG | BODY MASS INDEX: 23.4 KG/M2

## 2022-08-22 DIAGNOSIS — Z78.0 POSTMENOPAUSAL: ICD-10-CM

## 2022-08-22 DIAGNOSIS — E03.8 SECONDARY HYPOTHYROIDISM: Primary | ICD-10-CM

## 2022-08-22 DIAGNOSIS — R06.09 DYSPNEA ON EXERTION: ICD-10-CM

## 2022-08-22 PROCEDURE — 99203 OFFICE O/P NEW LOW 30 MIN: CPT | Mod: S$PBB,,, | Performed by: PHYSICIAN ASSISTANT

## 2022-08-22 PROCEDURE — 99999 PR PBB SHADOW E&M-EST. PATIENT-LVL V: CPT | Mod: PBBFAC,,, | Performed by: PHYSICIAN ASSISTANT

## 2022-08-22 PROCEDURE — 99203 PR OFFICE/OUTPT VISIT, NEW, LEVL III, 30-44 MIN: ICD-10-PCS | Mod: S$PBB,,, | Performed by: PHYSICIAN ASSISTANT

## 2022-08-22 PROCEDURE — 99999 PR PBB SHADOW E&M-EST. PATIENT-LVL V: ICD-10-PCS | Mod: PBBFAC,,, | Performed by: PHYSICIAN ASSISTANT

## 2022-08-22 PROCEDURE — 99215 OFFICE O/P EST HI 40 MIN: CPT | Mod: PBBFAC,PO | Performed by: PHYSICIAN ASSISTANT

## 2022-08-22 RX ORDER — ASCORBIC ACID 500 MG
500 TABLET ORAL
COMMUNITY
Start: 2022-08-09 | End: 2023-08-09

## 2022-08-22 RX ORDER — BUDESONIDE AND FORMOTEROL FUMARATE DIHYDRATE 80; 4.5 UG/1; UG/1
2 AEROSOL RESPIRATORY (INHALATION) 2 TIMES DAILY
COMMUNITY
Start: 2022-07-27 | End: 2022-09-13

## 2022-08-22 RX ORDER — LEVOTHYROXINE SODIUM 125 UG/1
125 TABLET ORAL
COMMUNITY
Start: 2022-08-09 | End: 2023-08-09

## 2022-08-22 RX ORDER — DULOXETIN HYDROCHLORIDE 60 MG/1
60 CAPSULE, DELAYED RELEASE ORAL
COMMUNITY
Start: 2022-02-04

## 2022-08-22 RX ORDER — ERGOCALCIFEROL 1.25 MG/1
CAPSULE ORAL
COMMUNITY
Start: 2022-08-10
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
## 2022-08-23 ENCOUNTER — TELEPHONE (OUTPATIENT)
Dept: PULMONOLOGY | Facility: CLINIC | Age: 52
End: 2022-08-23
Payer: MEDICARE

## 2022-08-23 NOTE — TELEPHONE ENCOUNTER
Pulmonology referral scheduled with patient: 8/24/2022 4104 Jeni Yusuf NP [facility].

## 2022-08-24 ENCOUNTER — OFFICE VISIT (OUTPATIENT)
Dept: PULMONOLOGY | Facility: CLINIC | Age: 52
End: 2022-08-24
Payer: MEDICARE

## 2022-08-24 ENCOUNTER — HOSPITAL ENCOUNTER (OUTPATIENT)
Dept: PULMONOLOGY | Facility: HOSPITAL | Age: 52
Discharge: HOME | DRG: 684 | End: 2022-08-24
Attending: NURSE PRACTITIONER
Payer: MEDICARE

## 2022-08-24 ENCOUNTER — HOSPITAL ENCOUNTER (OUTPATIENT)
Dept: RADIOLOGY | Facility: HOSPITAL | Age: 52
Discharge: HOME | DRG: 684 | End: 2022-08-24
Attending: NURSE PRACTITIONER
Payer: MEDICARE

## 2022-08-24 ENCOUNTER — DOCUMENTATION ONLY (OUTPATIENT)
Dept: PULMONOLOGY | Facility: CLINIC | Age: 52
End: 2022-08-24

## 2022-08-24 ENCOUNTER — HOSPITAL ENCOUNTER (INPATIENT)
Facility: HOSPITAL | Age: 52
LOS: 4 days | Discharge: HOME | DRG: 684 | End: 2022-08-30
Attending: EMERGENCY MEDICINE | Admitting: INTERNAL MEDICINE
Payer: MEDICARE

## 2022-08-24 VITALS
OXYGEN SATURATION: 96 % | SYSTOLIC BLOOD PRESSURE: 137 MMHG | HEIGHT: 62 IN | DIASTOLIC BLOOD PRESSURE: 82 MMHG | BODY MASS INDEX: 23.57 KG/M2 | HEART RATE: 98 BPM | WEIGHT: 128.06 LBS

## 2022-08-24 DIAGNOSIS — Z87.09 HISTORY OF ARDS: ICD-10-CM

## 2022-08-24 DIAGNOSIS — N18.9 CHRONIC KIDNEY DISEASE, UNSPECIFIED CKD STAGE: ICD-10-CM

## 2022-08-24 DIAGNOSIS — R06.09 DYSPNEA ON EXERTION: ICD-10-CM

## 2022-08-24 DIAGNOSIS — D50.9 IRON DEFICIENCY ANEMIA, UNSPECIFIED IRON DEFICIENCY ANEMIA TYPE: ICD-10-CM

## 2022-08-24 DIAGNOSIS — R53.83 FATIGUE, UNSPECIFIED TYPE: ICD-10-CM

## 2022-08-24 DIAGNOSIS — R07.9 CHEST PAIN: ICD-10-CM

## 2022-08-24 DIAGNOSIS — J45.51 SEVERE PERSISTENT ASTHMA WITH ACUTE EXACERBATION: Primary | ICD-10-CM

## 2022-08-24 DIAGNOSIS — G47.33 OSA (OBSTRUCTIVE SLEEP APNEA): ICD-10-CM

## 2022-08-24 DIAGNOSIS — N17.9 AKI (ACUTE KIDNEY INJURY): Primary | ICD-10-CM

## 2022-08-24 DIAGNOSIS — J45.51 SEVERE PERSISTENT ASTHMA WITH ACUTE EXACERBATION: ICD-10-CM

## 2022-08-24 PROBLEM — R10.32 LEFT LOWER QUADRANT ABDOMINAL PAIN: Status: ACTIVE | Noted: 2022-08-24

## 2022-08-24 PROBLEM — J45.909 ASTHMA: Status: ACTIVE | Noted: 2022-08-24

## 2022-08-24 LAB
ANION GAP SERPL CALC-SCNC: 17 MMOL/L (ref 8–16)
BILIRUB UR QL STRIP: NEGATIVE
BR6MWT: NORMAL
BUN SERPL-MCNC: 49 MG/DL (ref 6–20)
CALCIUM SERPL-MCNC: 9.7 MG/DL (ref 8.7–10.5)
CHLORIDE SERPL-SCNC: 97 MMOL/L (ref 95–110)
CLARITY UR: CLEAR
CO2 SERPL-SCNC: 20 MMOL/L (ref 23–29)
COLOR UR: YELLOW
CREAT SERPL-MCNC: 1.8 MG/DL (ref 0.5–1.4)
EST. GFR  (NO RACE VARIABLE): 33 ML/MIN/1.73 M^2
GLUCOSE SERPL-MCNC: 123 MG/DL (ref 70–110)
GLUCOSE UR QL STRIP: NEGATIVE
HGB UR QL STRIP: NEGATIVE
KETONES UR QL STRIP: NEGATIVE
LEUKOCYTE ESTERASE UR QL STRIP: NEGATIVE
NITRITE UR QL STRIP: NEGATIVE
PH UR STRIP: 6 [PH] (ref 5–8)
POTASSIUM SERPL-SCNC: 2.9 MMOL/L (ref 3.5–5.1)
PROT UR QL STRIP: ABNORMAL
SARS-COV-2 RDRP RESP QL NAA+PROBE: NEGATIVE
SODIUM SERPL-SCNC: 134 MMOL/L (ref 136–145)
SP GR UR STRIP: 1.02 (ref 1–1.03)
TSH SERPL DL<=0.005 MIU/L-ACNC: 0.47 UIU/ML (ref 0.4–4)
URN SPEC COLLECT METH UR: ABNORMAL
UROBILINOGEN UR STRIP-ACNC: NEGATIVE EU/DL

## 2022-08-24 PROCEDURE — 84300 ASSAY OF URINE SODIUM: CPT | Performed by: NURSE PRACTITIONER

## 2022-08-24 PROCEDURE — 86335 IMMUNFIX E-PHORSIS/URINE/CSF: CPT | Mod: 26,,, | Performed by: PATHOLOGY

## 2022-08-24 PROCEDURE — 63600175 PHARM REV CODE 636 W HCPCS: Performed by: NURSE PRACTITIONER

## 2022-08-24 PROCEDURE — 94618 PULMONARY STRESS TESTING: ICD-10-PCS | Mod: 26,,, | Performed by: INTERNAL MEDICINE

## 2022-08-24 PROCEDURE — 36415 COLL VENOUS BLD VENIPUNCTURE: CPT | Performed by: NURSE PRACTITIONER

## 2022-08-24 PROCEDURE — 80048 BASIC METABOLIC PNL TOTAL CA: CPT | Mod: XB | Performed by: EMERGENCY MEDICINE

## 2022-08-24 PROCEDURE — 71250 CT CHEST WITHOUT CONTRAST: ICD-10-PCS | Mod: 26,,, | Performed by: RADIOLOGY

## 2022-08-24 PROCEDURE — 99999 PR PBB SHADOW E&M-EST. PATIENT-LVL V: ICD-10-PCS | Mod: PBBFAC,,, | Performed by: NURSE PRACTITIONER

## 2022-08-24 PROCEDURE — 25000003 PHARM REV CODE 250: Performed by: NURSE PRACTITIONER

## 2022-08-24 PROCEDURE — 99215 OFFICE O/P EST HI 40 MIN: CPT | Mod: PBBFAC,PO | Performed by: NURSE PRACTITIONER

## 2022-08-24 PROCEDURE — 82570 ASSAY OF URINE CREATININE: CPT | Performed by: NURSE PRACTITIONER

## 2022-08-24 PROCEDURE — G0378 HOSPITAL OBSERVATION PER HR: HCPCS

## 2022-08-24 PROCEDURE — 94618 PULMONARY STRESS TESTING: CPT | Mod: 26,,, | Performed by: INTERNAL MEDICINE

## 2022-08-24 PROCEDURE — 84156 ASSAY OF PROTEIN URINE: CPT | Performed by: NURSE PRACTITIONER

## 2022-08-24 PROCEDURE — 86335 PATHOLOGIST INTERPRETATION UIFE: ICD-10-PCS | Mod: 26,,, | Performed by: PATHOLOGY

## 2022-08-24 PROCEDURE — U0002 COVID-19 LAB TEST NON-CDC: HCPCS | Performed by: EMERGENCY MEDICINE

## 2022-08-24 PROCEDURE — 94618 PULMONARY STRESS TESTING: CPT

## 2022-08-24 PROCEDURE — 86803 HEPATITIS C AB TEST: CPT | Performed by: EMERGENCY MEDICINE

## 2022-08-24 PROCEDURE — 81003 URINALYSIS AUTO W/O SCOPE: CPT | Performed by: EMERGENCY MEDICINE

## 2022-08-24 PROCEDURE — 86335 IMMUNFIX E-PHORSIS/URINE/CSF: CPT | Performed by: NURSE PRACTITIONER

## 2022-08-24 PROCEDURE — 71250 CT THORAX DX C-: CPT | Mod: TC

## 2022-08-24 PROCEDURE — 99204 PR OFFICE/OUTPT VISIT, NEW, LEVL IV, 45-59 MIN: ICD-10-PCS | Mod: 25,S$PBB,, | Performed by: NURSE PRACTITIONER

## 2022-08-24 PROCEDURE — 84443 ASSAY THYROID STIM HORMONE: CPT | Performed by: NURSE PRACTITIONER

## 2022-08-24 PROCEDURE — 99999 PR PBB SHADOW E&M-EST. PATIENT-LVL V: CPT | Mod: PBBFAC,,, | Performed by: NURSE PRACTITIONER

## 2022-08-24 PROCEDURE — 71250 CT THORAX DX C-: CPT | Mod: 26,,, | Performed by: RADIOLOGY

## 2022-08-24 PROCEDURE — 87389 HIV-1 AG W/HIV-1&-2 AB AG IA: CPT | Performed by: EMERGENCY MEDICINE

## 2022-08-24 PROCEDURE — 36415 COLL VENOUS BLD VENIPUNCTURE: CPT | Performed by: EMERGENCY MEDICINE

## 2022-08-24 PROCEDURE — 99285 EMERGENCY DEPT VISIT HI MDM: CPT | Mod: 25,27

## 2022-08-24 PROCEDURE — 99204 OFFICE O/P NEW MOD 45 MIN: CPT | Mod: 25,S$PBB,, | Performed by: NURSE PRACTITIONER

## 2022-08-24 RX ORDER — LEVOTHYROXINE SODIUM 125 UG/1
125 TABLET ORAL
Status: DISCONTINUED | OUTPATIENT
Start: 2022-08-25 | End: 2022-08-30 | Stop reason: HOSPADM

## 2022-08-24 RX ORDER — IBUPROFEN 200 MG
16 TABLET ORAL
Status: DISCONTINUED | OUTPATIENT
Start: 2022-08-24 | End: 2022-08-27

## 2022-08-24 RX ORDER — SODIUM CHLORIDE 0.9 % (FLUSH) 0.9 %
10 SYRINGE (ML) INJECTION EVERY 12 HOURS PRN
Status: DISCONTINUED | OUTPATIENT
Start: 2022-08-24 | End: 2022-08-30 | Stop reason: HOSPADM

## 2022-08-24 RX ORDER — AMOXICILLIN 250 MG
1 CAPSULE ORAL 2 TIMES DAILY
Status: DISCONTINUED | OUTPATIENT
Start: 2022-08-24 | End: 2022-08-30 | Stop reason: HOSPADM

## 2022-08-24 RX ORDER — OXYCODONE HYDROCHLORIDE 30 MG/1
30 TABLET ORAL 3 TIMES DAILY PRN
COMMUNITY

## 2022-08-24 RX ORDER — MIRTAZAPINE 15 MG/1
15 TABLET, FILM COATED ORAL NIGHTLY
Status: DISCONTINUED | OUTPATIENT
Start: 2022-08-24 | End: 2022-08-30 | Stop reason: HOSPADM

## 2022-08-24 RX ORDER — IPRATROPIUM BROMIDE AND ALBUTEROL SULFATE 2.5; .5 MG/3ML; MG/3ML
3 SOLUTION RESPIRATORY (INHALATION) EVERY 4 HOURS PRN
Status: DISCONTINUED | OUTPATIENT
Start: 2022-08-24 | End: 2022-08-30 | Stop reason: HOSPADM

## 2022-08-24 RX ORDER — LANOLIN ALCOHOL/MO/W.PET/CERES
800 CREAM (GRAM) TOPICAL
Status: DISCONTINUED | OUTPATIENT
Start: 2022-08-24 | End: 2022-08-27

## 2022-08-24 RX ORDER — ACETAMINOPHEN 325 MG/1
650 TABLET ORAL EVERY 8 HOURS PRN
Status: DISCONTINUED | OUTPATIENT
Start: 2022-08-24 | End: 2022-08-30 | Stop reason: HOSPADM

## 2022-08-24 RX ORDER — IPRATROPIUM BROMIDE AND ALBUTEROL SULFATE 2.5; .5 MG/3ML; MG/3ML
3 SOLUTION RESPIRATORY (INHALATION) EVERY 6 HOURS PRN
Qty: 75 ML | Refills: 3 | Status: SHIPPED | OUTPATIENT
Start: 2022-08-24 | End: 2023-08-24

## 2022-08-24 RX ORDER — IBUPROFEN 200 MG
24 TABLET ORAL
Status: DISCONTINUED | OUTPATIENT
Start: 2022-08-24 | End: 2022-08-27

## 2022-08-24 RX ORDER — FLUTICASONE FUROATE AND VILANTEROL 100; 25 UG/1; UG/1
1 POWDER RESPIRATORY (INHALATION) DAILY
Status: DISCONTINUED | OUTPATIENT
Start: 2022-08-25 | End: 2022-08-30 | Stop reason: HOSPADM

## 2022-08-24 RX ORDER — GLUCAGON 1 MG
1 KIT INJECTION
Status: DISCONTINUED | OUTPATIENT
Start: 2022-08-24 | End: 2022-08-27

## 2022-08-24 RX ORDER — NALOXONE HCL 0.4 MG/ML
0.02 VIAL (ML) INJECTION
Status: DISCONTINUED | OUTPATIENT
Start: 2022-08-24 | End: 2022-08-30 | Stop reason: HOSPADM

## 2022-08-24 RX ORDER — DULOXETIN HYDROCHLORIDE 30 MG/1
60 CAPSULE, DELAYED RELEASE ORAL DAILY
Status: DISCONTINUED | OUTPATIENT
Start: 2022-08-25 | End: 2022-08-30 | Stop reason: HOSPADM

## 2022-08-24 RX ORDER — BISACODYL 5 MG
10 TABLET, DELAYED RELEASE (ENTERIC COATED) ORAL ONCE
Status: COMPLETED | OUTPATIENT
Start: 2022-08-24 | End: 2022-08-24

## 2022-08-24 RX ORDER — SODIUM CHLORIDE AND POTASSIUM CHLORIDE 150; 900 MG/100ML; MG/100ML
INJECTION, SOLUTION INTRAVENOUS CONTINUOUS
Status: DISCONTINUED | OUTPATIENT
Start: 2022-08-24 | End: 2022-08-27

## 2022-08-24 RX ORDER — POTASSIUM CHLORIDE 20 MEQ/1
40 TABLET, EXTENDED RELEASE ORAL
Status: COMPLETED | OUTPATIENT
Start: 2022-08-24 | End: 2022-08-25

## 2022-08-24 RX ORDER — ASCORBIC ACID 500 MG
500 TABLET ORAL DAILY
Status: DISCONTINUED | OUTPATIENT
Start: 2022-08-25 | End: 2022-08-30 | Stop reason: HOSPADM

## 2022-08-24 RX ORDER — SIMETHICONE 80 MG
1 TABLET,CHEWABLE ORAL 4 TIMES DAILY PRN
Status: DISCONTINUED | OUTPATIENT
Start: 2022-08-24 | End: 2022-08-30 | Stop reason: HOSPADM

## 2022-08-24 RX ORDER — OXYCODONE HYDROCHLORIDE 10 MG/1
30 TABLET ORAL EVERY 8 HOURS PRN
Status: DISCONTINUED | OUTPATIENT
Start: 2022-08-24 | End: 2022-08-30 | Stop reason: HOSPADM

## 2022-08-24 RX ORDER — OXCARBAZEPINE 150 MG/1
900 TABLET, FILM COATED ORAL 2 TIMES DAILY
Status: DISCONTINUED | OUTPATIENT
Start: 2022-08-24 | End: 2022-08-30 | Stop reason: HOSPADM

## 2022-08-24 RX ORDER — PREDNISONE 20 MG/1
20 TABLET ORAL DAILY
Qty: 5 TABLET | Refills: 0 | Status: ON HOLD | OUTPATIENT
Start: 2022-08-24 | End: 2022-08-30 | Stop reason: HOSPADM

## 2022-08-24 RX ORDER — TALC
6 POWDER (GRAM) TOPICAL NIGHTLY PRN
Status: DISCONTINUED | OUTPATIENT
Start: 2022-08-24 | End: 2022-08-27

## 2022-08-24 RX ORDER — ONDANSETRON 2 MG/ML
4 INJECTION INTRAMUSCULAR; INTRAVENOUS EVERY 8 HOURS PRN
Status: DISCONTINUED | OUTPATIENT
Start: 2022-08-24 | End: 2022-08-25

## 2022-08-24 RX ORDER — MAG HYDROX/ALUMINUM HYD/SIMETH 200-200-20
30 SUSPENSION, ORAL (FINAL DOSE FORM) ORAL 4 TIMES DAILY PRN
Status: DISCONTINUED | OUTPATIENT
Start: 2022-08-24 | End: 2022-08-30 | Stop reason: HOSPADM

## 2022-08-24 RX ADMIN — DOCUSATE SODIUM AND SENNOSIDES 1 TABLET: 8.6; 5 TABLET, FILM COATED ORAL at 09:08

## 2022-08-24 RX ADMIN — POTASSIUM CHLORIDE 40 MEQ: 1500 TABLET, EXTENDED RELEASE ORAL at 07:08

## 2022-08-24 RX ADMIN — BISACODYL 10 MG: 5 TABLET, COATED ORAL at 09:08

## 2022-08-24 RX ADMIN — ONDANSETRON 4 MG: 2 INJECTION INTRAMUSCULAR; INTRAVENOUS at 09:08

## 2022-08-24 RX ADMIN — SODIUM CHLORIDE AND POTASSIUM CHLORIDE: .9; .15 SOLUTION INTRAVENOUS at 09:08

## 2022-08-24 RX ADMIN — POTASSIUM CHLORIDE 40 MEQ: 1500 TABLET, EXTENDED RELEASE ORAL at 09:08

## 2022-08-24 RX ADMIN — MIRTAZAPINE 15 MG: 15 TABLET, FILM COATED ORAL at 09:08

## 2022-08-24 RX ADMIN — OXCARBAZEPINE 900 MG: 150 TABLET, FILM COATED ORAL at 09:08

## 2022-08-24 NOTE — PROGRESS NOTES
Received critical Potassium of 2.6 on CMP that was obtained this AM.    Discussed results with patient on 08/24/2022 at 0200. Advised patient to go to [facility] for IV K replacement and possible Nephrology consultation given kidney dysfunction.    Patient verbalizes understanding and acceptance. States her sister will drive her to the ER.

## 2022-08-24 NOTE — PROGRESS NOTES
"8/24/2022    SandraKellee Anthony  New Patient Consult    Chief Complaint   Patient presents with    Shortness of Breath     Pt gets SOB when walking short distance.    Wheezing     Off and on pt has wheezing     Asthma     All her life     Chest Pain     Sometimes        HPI:  8/24/2022:  In office today with sister. Reports history of asthma, allergies.  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  Has suffered with asthma intermittently over the years, as a child would receive allergy injections twice per week.  Shortness of breath: Gradually worsening over the last month - states can't walk from the kitchen to the bedroom without getting significantly short of breath. Improves with rest. Associated with wheezing, chest tightness.  Cough: Intermittent - productive with light green mucous. Cough worse at night time, associated with nocturnal arousals.   2 weeks ago was prescribed Symbicort - states she is taking two puffs BID without noticeable improvement.   Using Albuterol as needed, approximately 4-6 times per day.  Review of [facility] reveals patient was seen per PCP in August - reported at that time shortness of breath that had gradually progressed over the prior few months. Patient states however over the last month symptoms have increased in severity.  Recently evaluated per Hematology, is scheduled to start iron infusions on 8/31.  Recently started falling - states she will get so short winded with walking and will get dizzy and sees black spots, eyes go blurry - causing fall.   Reports history of REJI prior to gastric bypass surgery - states never received CPAP machine. Endorses daytime fatigue, difficulty falling asleep, morning headaches occasionally, and depression.  SLEEP ROUTINE:  Activity the hour prior to sleep: Watching TV  Bed partner: Grandson   Time to bed: Midnight  Lights On/Off: Off  Sleep onset " latency: Varies        Disruptions or awakenings: Nightly   Wakeup time: 0800  Perceived sleep quality: Poor  Daytime naps: Daily      Weekend sleep routine: No change    Caffeine use: Cokes, 4 per day.    Exercise habit: No    EPWORTH SLEEPINESS SCALE 2022: 20          Social Hx: Lives with daughter - 2 dogs in the house. Disabled - prior inventory worker, . No Asbestosis exposure, Smoking Hx: Never smoker  Family Hx: No Lung Cancer, No COPD, No Asthma  Medical Hx: Previous pneumonia ; No previous shoulder/chest surgery      The chief compliant problem is new to me  PFSH:  Past Medical History:   Diagnosis Date    B12 deficiency anemia     Chronic pain     CKD (chronic kidney disease) stage 3, GFR 30-59 ml/min     Depression     Epilepsy     Hematuria     Hypertension     Hypokalemia     Insomnia     Migraines     Proteinuria          Past Surgical History:   Procedure Laterality Date    APPENDECTOMY      BELT ABDOMINOPLASTY      breast implants  2013    BREAST SURGERY Bilateral          SECTION, LOW TRANSVERSE      x3    CHOLECYSTECTOMY      GASTRIC BYPASS      HYSTERECTOMY      REDUCTION MAMMAPLASTY      reduction and implants    TONSILLECTOMY, ADENOIDECTOMY       Social History     Tobacco Use    Smoking status: Never Smoker    Smokeless tobacco: Never Used   Substance Use Topics    Alcohol use: No    Drug use: No     Family History   Problem Relation Age of Onset    Diabetes Mother     Hypertension Mother     Heart disease Mother     Cancer Mother         breast    Breast cancer Mother     Kidney disease Father     No Known Problems Sister     No Known Problems Brother     No Known Problems Daughter     No Known Problems Son     Colon cancer Neg Hx     Ovarian cancer Neg Hx      Review of patient's allergies indicates:   Allergen Reactions    Bactrim [sulfamethoxazole-trimethoprim] Itching    Tramadol     Vancomycin analogues Rash  "    I have reviewed past medical, family, and social history. I have reviewed previous nurse notes.    Performance Status:The patient's activity level is housebound activities.        Review of Systems   Constitutional: Positive for activity change, appetite change, fatigue and unexpected weight change. Negative for chills, diaphoresis and fever.   HENT: Negative for congestion, sinus pressure, sinus pain, sore throat and trouble swallowing.    Eyes: Negative for photophobia and visual disturbance.   Respiratory: Positive for cough, shortness of breath and wheezing. Negative for choking and chest tightness.    Cardiovascular: Positive for leg swelling. Negative for chest pain and palpitations.   Gastrointestinal: Positive for nausea and vomiting. Negative for abdominal distention, abdominal pain, blood in stool, constipation and diarrhea.   Genitourinary: Negative for difficulty urinating, dysuria, flank pain and hematuria.   Musculoskeletal: Positive for back pain and neck pain. Negative for gait problem and joint swelling.   Skin: Negative for rash and wound.   Allergic/Immunologic: Positive for environmental allergies. Negative for food allergies.   Neurological: Positive for dizziness and seizures. Negative for syncope, weakness, light-headedness, numbness and headaches.   Hematological: Does not bruise/bleed easily.   Psychiatric/Behavioral: Negative for confusion and sleep disturbance. The patient is nervous/anxious.          Exam:Comprehensive exam done. /82 (BP Location: Left arm, Patient Position: Sitting, BP Method: Small (Automatic))   Pulse 98   Ht 5' 2" (1.575 m)   Wt 58.1 kg (128 lb 1.4 oz)   SpO2 96% Comment: room air at rest  BMI 23.43 kg/m²   Exam included Vitals as listed  Constitutional: She is oriented to person, place, and time. She appears well-developed. No distress.   Nose: Nose normal.   Mouth/Throat: Uvula is midline, oropharynx is clear and moist and mucous membranes are normal. " No dental caries. No oropharyngeal exudate, posterior oropharyngeal edema, posterior oropharyngeal erythema or tonsillar abscesses.  Mallapatti (M) score  Eyes: Pupils are equal, round, and reactive to light.   Neck: No JVD present. No thyromegaly present.   Cardiovascular: Normal rate, regular rhythm and normal heart sounds. Exam reveals no gallop and no friction rub.   No murmur heard.  Pulmonary/Chest: Effort normal and breath sounds normal. No accessory muscle usage or stridor. No apnea and no tachypnea. No respiratory distress, decreased breath sounds, wheezes, rhonchi, rales, or tenderness. On room air, rhonchi noted to right lower lobe, in no acute distress. Obvious tachypnea noted with ambulation.   Abdominal: Soft. She exhibits no mass. There is no tenderness. No hepatosplenomegaly, hernias and normoactive bowel sounds  Musculoskeletal: Normal range of motion. exhibits no edema.   Neurological:  alert and oriented to person, place, and time. not disoriented.   Skin: Skin is warm and dry. Capillary refill takes less 2 sec. No cyanosis or erythema. No pallor. Nails show no clubbing.   Psychiatric: normal mood and affect. behavior is normal. Judgment and thought content normal.       Radiographs (ct chest and cxr) reviewed: view by direct vision     CT Abdomen Pelvis  Without Contrast  3/4/2018   The lung bases are well aerated.  Heart size is normal.  No pericardial or pleural effusion.       X-Ray Chest PA And Lateral   2/11/2018   History: Chest pain.  Injury.   Comparison:  Comparison is made with previous chest x-ray of 06/01/2016 and concurrent rib x-rays.   Findings: Heart is normal in size.  Lungs are clear.  The right ribs appear intact.  There are old healed fractures of the left 7th, 8th, and 9th ribs.  IMPRESSION:     No acute findings.        Labs reviewed    Lab Results   Component Value Date    WBC 9.76 03/04/2018    RBC 4.38 03/04/2018    HGB 13.4 03/04/2018    HCT 39.3 03/04/2018    MCV 90  03/04/2018    MCH 30.6 03/04/2018    MCHC 34.1 03/04/2018    RDW 12.7 03/04/2018     03/04/2018    MPV 10.3 03/04/2018    GRAN 6.3 03/04/2018    GRAN 65.0 03/04/2018    LYMPH 1.7 03/04/2018    LYMPH 17.0 (L) 03/04/2018    MONO 1.4 (H) 03/04/2018    MONO 14.2 03/04/2018    EOS 0.3 03/04/2018    BASO 0.06 03/04/2018    EOSINOPHIL 2.7 03/04/2018    BASOPHIL 0.6 03/04/2018       PFT will be done and results to be reviewed  Pulmonary Functions Testing Results:    Two minute ambulation test done in office on 8/24/22, no desaturation noted below 96%.    Plan:  Clinical impression is resonably certain and repeated evaluation prn +/- follow up will be needed as below.    Ayana was seen today for shortness of breath, wheezing, asthma and chest pain.    Diagnoses and all orders for this visit:    Severe persistent asthma with acute exacerbation  -     Complete PFT with bronchodilator; Future  -     CT Chest Without Contrast; Future  -     NEBULIZER FOR HOME USE  -     predniSONE (DELTASONE) 20 MG tablet; Take 1 tablet (20 mg total) by mouth once daily.  -     albuterol-ipratropium (DUO-NEB) 2.5 mg-0.5 mg/3 mL nebulizer solution; Take 3 mLs by nebulization every 6 (six) hours as needed for Wheezing or Shortness of Breath. Rescue    Dyspnea on exertion  -     Ambulatory referral/consult to Pulmonology  -     Six Minute Walk Test to qualify for Home Oxygen; Future  -     Complete PFT with bronchodilator; Future  -     B-TYPE NATRIURETIC PEPTIDE; Future  -     D-DIMER, QUANTITATIVE; Future  -     CBC auto differential; Future  -     Comprehensive Metabolic Panel; Future  -     CT Chest Without Contrast; Future  -     NEBULIZER FOR HOME USE  -     albuterol-ipratropium (DUO-NEB) 2.5 mg-0.5 mg/3 mL nebulizer solution; Take 3 mLs by nebulization every 6 (six) hours as needed for Wheezing or Shortness of Breath. Rescue    Iron deficiency anemia, unspecified iron deficiency anemia type    Fatigue, unspecified type    REJI  (obstructive sleep apnea)    History of ARDS  -     CT Chest Without Contrast; Future    Chronic kidney disease, unspecified CKD stage        Follow up in about 6 weeks (around 10/5/2022), or if symptoms worsen or fail to improve.    Discussed with patient above for education the following:      Patient Instructions   Unclear etiology of dyspnea at this time.    Asthma?    Let's trial Prednisone - take one pill per day for five days.    Stop Symbicort for the next two weeks and trial Trelegy. Use this one once per day.  This inhaler contains an inhaled steroid component. Rinse mouth after each use due to risk for thrush development. If mouth or tongue develops white sores please contact the clinic and I will order a prescription mouth wash.     Continue to use albuterol as needed for shortness of breath, wheezing, cough.    Can start using nebulized treatments, would recommend 1-2 treatments per day for five days.    Can check labs (BNP, CMP, CBC, D-Dimer).    STAT CT Chest now. Can not do CTA due to CKD. May need VQ scan if D-dimer elevated.    I ordered a Lung Function Test to evaluate lung strength. Will do this once clinically improved.    Six minute walk test now to see if you qualify for supplemental oxygen.    Continue current medication regiment. Keep follow up appointment as scheduled. Please call the office if you have any questions or concerns.

## 2022-08-24 NOTE — HPI
Ayana Anthony is a 52-year-old female with past medical history significant for anemia, chronic kidney disease, hyperkalemia, hypertension, epilepsy, and chronic pain who presented to the emergency department at the direction of her pulmonologist.  She had an abnormal potassium of 2.6 this morning at her pulmonology appointment.  She reports LLQ abdominal pain, left flank pain, decreased urine output and difficulty voiding which has been ongoing for about 2 weeks.  No hematuria or dysuria.  Denies recent fevers or chest pain.  She feels generally weak and is dyspneic on exertion.  Surgical history includes gastric bypass, hysterectomy, cholecystectomy, and abdominoplasty.  Workup in the emergency department is significant for potassium 2.9 and creatinine 1.8.  She will be placed on observation the service of hospital Medicine for continued monitoring and treatment.

## 2022-08-24 NOTE — ASSESSMENT & PLAN NOTE
Patient with acute kidney injury likely due to IVVD/dehydration ALEX is currently worsening. Labs reviewed- Renal function/electrolytes with Estimated Creatinine Clearance: 28.6 mL/min (A) (based on SCr of 1.8 mg/dL (H)). according to latest data. Monitor urine output and serial BMP and adjust therapy as needed. Avoid nephrotoxins and renally dose meds for GFR listed above.   IV fluid hydration  Check urine sodium, creatinine, and protein.  Urinalysis negative for infection.

## 2022-08-24 NOTE — ED PROVIDER NOTES
Encounter Date: 2022    SCRIBE #1 NOTE: I, Judith Lawton, am scribing for, and in the presence of, Apolinar Henderson MD.       History     Chief Complaint   Patient presents with    abnormal lab work     Hypokalemia     Time seen by provider: 4:57 PM on 2022    Ayana Anthony is a 52 y.o. female with a PMHx of B12 deficiency anemia, CKD, hypokalemia, HTN, epilepsy, and chronic pain presenting to the ED for further evaluation after she had an abnormal potassium of 2.6 this AM at her pulmonology appointment. Patient reports mild lower abdominal pain, left flank pain, decreased urine output and urgency for quite some time. No hematuria or dysuria. States she hasn't seen a nephrologist in years. Patient is not currently on diuretics. She also feels generally weak and dyspneic on exertion. She can only walk about 20 feet without getting short of breath. PSHx of gastric bypass, hysterectomy, cholecystectomy, and abdominoplasty.    The history is provided by the patient.     Review of patient's allergies indicates:   Allergen Reactions    Bactrim [sulfamethoxazole-trimethoprim] Itching    Tramadol     Vancomycin analogues Rash     Past Medical History:   Diagnosis Date    B12 deficiency anemia     Chronic pain     CKD (chronic kidney disease) stage 3, GFR 30-59 ml/min     Depression     Epilepsy     Hematuria     Hypertension     Hypokalemia     Insomnia     Migraines     Proteinuria      Past Surgical History:   Procedure Laterality Date    APPENDECTOMY      BELT ABDOMINOPLASTY      breast implants  2013    BREAST SURGERY Bilateral          SECTION, LOW TRANSVERSE      x3    CHOLECYSTECTOMY      GASTRIC BYPASS      HYSTERECTOMY      REDUCTION MAMMAPLASTY      reduction and implants    TONSILLECTOMY, ADENOIDECTOMY       Family History   Problem Relation Age of Onset    Diabetes Mother     Hypertension Mother     Heart disease Mother     Cancer Mother          breast    Breast cancer Mother     Kidney disease Father     No Known Problems Sister     No Known Problems Brother     No Known Problems Daughter     No Known Problems Son     Colon cancer Neg Hx     Ovarian cancer Neg Hx      Social History     Tobacco Use    Smoking status: Never Smoker    Smokeless tobacco: Never Used   Substance Use Topics    Alcohol use: No    Drug use: No     Review of Systems   Constitutional: Negative for fever.   HENT: Negative for sore throat.    Respiratory: Positive for shortness of breath.    Cardiovascular: Negative for chest pain.   Gastrointestinal: Positive for abdominal pain. Negative for nausea.   Genitourinary: Positive for decreased urine volume, flank pain (left) and urgency. Negative for dysuria and hematuria.   Musculoskeletal: Negative for back pain.   Skin: Negative for rash.   Neurological: Positive for weakness.   Hematological: Does not bruise/bleed easily.       Physical Exam     Initial Vitals [08/24/22 1616]   BP Pulse Resp Temp SpO2   118/66 92 18 98.4 °F (36.9 °C) 95 %      MAP       --         Physical Exam    Nursing note and vitals reviewed.  Constitutional: Vital signs are normal. She appears well-developed and well-nourished.  Non-toxic appearance. No distress.   HENT:   Head: Normocephalic and atraumatic.   Eyes: EOM are normal. Pupils are equal, round, and reactive to light.   Neck: Neck supple. No JVD present.   Normal range of motion.  Cardiovascular: Normal rate, regular rhythm, normal heart sounds and intact distal pulses. Exam reveals no gallop and no friction rub.    No murmur heard.  Pulmonary/Chest: Breath sounds normal. She has no wheezes. She has no rhonchi. She has no rales.   Abdominal: Abdomen is soft. Bowel sounds are normal. There is no abdominal tenderness. There is no rebound and no guarding.   Musculoskeletal:         General: Normal range of motion.      Cervical back: Normal range of motion and neck supple. No rigidity.      Neurological: She is alert and oriented to person, place, and time. She has normal strength and normal reflexes. No cranial nerve deficit or sensory deficit. She exhibits normal muscle tone. Coordination normal. GCS eye subscore is 4. GCS verbal subscore is 5. GCS motor subscore is 6.   Skin: Skin is warm and dry.   Psychiatric: She has a normal mood and affect. Her speech is normal and behavior is normal. She is not actively hallucinating.         ED Course   Procedures  Labs Reviewed   URINALYSIS, REFLEX TO URINE CULTURE - Abnormal; Notable for the following components:       Result Value    Protein, UA Trace (*)     All other components within normal limits    Narrative:     Specimen Source->Urine   BASIC METABOLIC PANEL - Abnormal; Notable for the following components:    Sodium 134 (*)     Potassium 2.9 (*)     CO2 20 (*)     Glucose 123 (*)     BUN 49 (*)     Creatinine 1.8 (*)     Anion Gap 17 (*)     eGFR 33 (*)     All other components within normal limits   SARS-COV-2 RNA AMPLIFICATION, QUAL   TSH   HIV 1 / 2 ANTIBODY   HEPATITIS C ANTIBODY   SODIUM, URINE, RANDOM   IMMUNOFIXATION, URINE  RANDOM   CREATININE, URINE, RANDOM   PROTEIN, QUANTITATIVE, URINE RANDOM          Imaging Results          CT Abdomen Pelvis  Without Contrast (In process)  Result time 08/24/22 19:16:17                 Medications   potassium chloride SA CR tablet 40 mEq (has no administration in time range)     Medical Decision Making:   History:   Old Medical Records: I decided to obtain old medical records.  Initial Assessment:   52-year-old woman presents emergency department for symptomatic hypokalemia on outpatient lab stay by pulmonary clinic.  Potassium initially 2.6.  Is currently improved to 2.9 however still very low.  Discussed Hospital Medicine agrees to observe the patient given her decreased renal function.  They will replace potassium.  Recheck in the morning.  Clinical Tests:   Lab Tests: Ordered and Reviewed           Scribe Attestation:   Scribe #1: I performed the above scribed service and the documentation accurately describes the services I performed. I attest to the accuracy of the note.               I, Santiago Macias, personally performed the services described in this documentation. All medical record entries made by the scribe were at my direction and in my presence.  I have reviewed the chart and agree that the record reflects my personal performance and is accurate and complete. Apolinar Henderson MD.  Clinical Impression:   Final diagnoses:  [N17.9] ALEX (acute kidney injury)          ED Disposition Condition    Observation               Apolinar Henderson MD  08/24/22 1916

## 2022-08-24 NOTE — ASSESSMENT & PLAN NOTE
Potassium 2.9 on admission.  Will replete and monitor, repleting as necessary.  Monitor telemetry.

## 2022-08-24 NOTE — PATIENT INSTRUCTIONS
Unclear etiology of dyspnea at this time.    Asthma?    Let's trial Prednisone - take one pill per day for five days.    Stop Symbicort for the next two weeks and trial Trelegy. Use this one once per day.  This inhaler contains an inhaled steroid component. Rinse mouth after each use due to risk for thrush development. If mouth or tongue develops white sores please contact the clinic and I will order a prescription mouth wash.     Continue to use albuterol as needed for shortness of breath, wheezing, cough.    Can start using nebulized treatments, would recommend 1-2 treatments per day for five days.    Can check labs (BNP, CMP, CBC, D-Dimer).    STAT CT Chest now. Can not do CTA due to CKD. May need VQ scan if D-dimer elevated.    I ordered a Lung Function Test to evaluate lung strength. Will do this once clinically improved.    Six minute walk test now to see if you qualify for supplemental oxygen.    Continue current medication regiment. Keep follow up appointment as scheduled. Please call the office if you have any questions or concerns.

## 2022-08-25 LAB
ANION GAP SERPL CALC-SCNC: 11 MMOL/L (ref 8–16)
ANION GAP SERPL CALC-SCNC: 11 MMOL/L (ref 8–16)
BUN SERPL-MCNC: 33 MG/DL (ref 6–20)
BUN SERPL-MCNC: 40 MG/DL (ref 6–20)
CALCIUM SERPL-MCNC: 8.5 MG/DL (ref 8.7–10.5)
CALCIUM SERPL-MCNC: 8.5 MG/DL (ref 8.7–10.5)
CHLORIDE SERPL-SCNC: 105 MMOL/L (ref 95–110)
CHLORIDE SERPL-SCNC: 107 MMOL/L (ref 95–110)
CO2 SERPL-SCNC: 19 MMOL/L (ref 23–29)
CO2 SERPL-SCNC: 20 MMOL/L (ref 23–29)
CREAT SERPL-MCNC: 1.3 MG/DL (ref 0.5–1.4)
CREAT SERPL-MCNC: 1.3 MG/DL (ref 0.5–1.4)
CREAT UR-MCNC: 93.5 MG/DL (ref 15–325)
EST. GFR  (NO RACE VARIABLE): 49 ML/MIN/1.73 M^2
EST. GFR  (NO RACE VARIABLE): 49 ML/MIN/1.73 M^2
FERRITIN SERPL-MCNC: 8 NG/ML (ref 20–300)
FOLATE SERPL-MCNC: 8.3 NG/ML (ref 4–24)
GLUCOSE SERPL-MCNC: 79 MG/DL (ref 70–110)
GLUCOSE SERPL-MCNC: 91 MG/DL (ref 70–110)
HCV AB SERPL QL IA: NEGATIVE
HIV 1+2 AB+HIV1 P24 AG SERPL QL IA: NEGATIVE
IRON SERPL-MCNC: 25 UG/DL (ref 30–160)
MAGNESIUM SERPL-MCNC: 2.1 MG/DL (ref 1.6–2.6)
POTASSIUM SERPL-SCNC: 3.1 MMOL/L (ref 3.5–5.1)
POTASSIUM SERPL-SCNC: 3.9 MMOL/L (ref 3.5–5.1)
PROT UR-MCNC: 32 MG/DL (ref 0–15)
SATURATED IRON: 7 % (ref 20–50)
SODIUM SERPL-SCNC: 136 MMOL/L (ref 136–145)
SODIUM SERPL-SCNC: 137 MMOL/L (ref 136–145)
SODIUM UR-SCNC: 41 MMOL/L (ref 20–250)
TOTAL IRON BINDING CAPACITY: 366 UG/DL (ref 250–450)
TRANSFERRIN SERPL-MCNC: 247 MG/DL (ref 200–375)
VIT B12 SERPL-MCNC: 312 PG/ML (ref 210–950)

## 2022-08-25 PROCEDURE — 82728 ASSAY OF FERRITIN: CPT | Performed by: NURSE PRACTITIONER

## 2022-08-25 PROCEDURE — 36415 COLL VENOUS BLD VENIPUNCTURE: CPT | Performed by: NURSE PRACTITIONER

## 2022-08-25 PROCEDURE — 99900035 HC TECH TIME PER 15 MIN (STAT)

## 2022-08-25 PROCEDURE — 25000003 PHARM REV CODE 250: Performed by: NURSE PRACTITIONER

## 2022-08-25 PROCEDURE — 94761 N-INVAS EAR/PLS OXIMETRY MLT: CPT

## 2022-08-25 PROCEDURE — 63600175 PHARM REV CODE 636 W HCPCS: Performed by: NURSE PRACTITIONER

## 2022-08-25 PROCEDURE — 82607 VITAMIN B-12: CPT | Performed by: NURSE PRACTITIONER

## 2022-08-25 PROCEDURE — 94640 AIRWAY INHALATION TREATMENT: CPT

## 2022-08-25 PROCEDURE — G0378 HOSPITAL OBSERVATION PER HR: HCPCS

## 2022-08-25 PROCEDURE — 80048 BASIC METABOLIC PNL TOTAL CA: CPT | Mod: 91 | Performed by: NURSE PRACTITIONER

## 2022-08-25 PROCEDURE — 82746 ASSAY OF FOLIC ACID SERUM: CPT | Performed by: NURSE PRACTITIONER

## 2022-08-25 PROCEDURE — 83735 ASSAY OF MAGNESIUM: CPT | Performed by: NURSE PRACTITIONER

## 2022-08-25 PROCEDURE — 84466 ASSAY OF TRANSFERRIN: CPT | Performed by: NURSE PRACTITIONER

## 2022-08-25 PROCEDURE — 25000242 PHARM REV CODE 250 ALT 637 W/ HCPCS: Performed by: NURSE PRACTITIONER

## 2022-08-25 PROCEDURE — 80048 BASIC METABOLIC PNL TOTAL CA: CPT | Performed by: NURSE PRACTITIONER

## 2022-08-25 RX ORDER — HYDROCHLOROTHIAZIDE 25 MG/1
25 TABLET ORAL DAILY
Refills: 3
Start: 2022-08-25 | End: 2022-08-30 | Stop reason: HOSPADM

## 2022-08-25 RX ORDER — ONDANSETRON 2 MG/ML
4 INJECTION INTRAMUSCULAR; INTRAVENOUS EVERY 6 HOURS PRN
Status: DISCONTINUED | OUTPATIENT
Start: 2022-08-25 | End: 2022-08-25

## 2022-08-25 RX ORDER — ONDANSETRON 2 MG/ML
8 INJECTION INTRAMUSCULAR; INTRAVENOUS EVERY 6 HOURS PRN
Status: DISCONTINUED | OUTPATIENT
Start: 2022-08-25 | End: 2022-08-26

## 2022-08-25 RX ORDER — POTASSIUM CHLORIDE 20 MEQ/1
40 TABLET, EXTENDED RELEASE ORAL ONCE
Status: COMPLETED | OUTPATIENT
Start: 2022-08-25 | End: 2022-08-25

## 2022-08-25 RX ORDER — LACTULOSE 10 G/15ML
20 SOLUTION ORAL EVERY 6 HOURS PRN
Status: DISCONTINUED | OUTPATIENT
Start: 2022-08-25 | End: 2022-08-27

## 2022-08-25 RX ORDER — LACTULOSE 10 G/15ML
20 SOLUTION ORAL ONCE
Status: COMPLETED | OUTPATIENT
Start: 2022-08-25 | End: 2022-08-25

## 2022-08-25 RX ADMIN — POTASSIUM CHLORIDE 40 MEQ: 1500 TABLET, EXTENDED RELEASE ORAL at 08:08

## 2022-08-25 RX ADMIN — OXYCODONE HYDROCHLORIDE 30 MG: 10 TABLET ORAL at 11:08

## 2022-08-25 RX ADMIN — SODIUM CHLORIDE 500 ML: 0.9 INJECTION, SOLUTION INTRAVENOUS at 08:08

## 2022-08-25 RX ADMIN — LACTULOSE 20 G: 20 SOLUTION ORAL at 09:08

## 2022-08-25 RX ADMIN — LEVOTHYROXINE SODIUM 125 MCG: 0.03 TABLET ORAL at 05:08

## 2022-08-25 RX ADMIN — OXCARBAZEPINE 900 MG: 150 TABLET, FILM COATED ORAL at 08:08

## 2022-08-25 RX ADMIN — OXYCODONE HYDROCHLORIDE 30 MG: 10 TABLET ORAL at 02:08

## 2022-08-25 RX ADMIN — OXYCODONE HYDROCHLORIDE AND ACETAMINOPHEN 500 MG: 500 TABLET ORAL at 08:08

## 2022-08-25 RX ADMIN — POTASSIUM CHLORIDE 40 MEQ: 1500 TABLET, EXTENDED RELEASE ORAL at 01:08

## 2022-08-25 RX ADMIN — PROMETHAZINE HYDROCHLORIDE 12.5 MG: 25 INJECTION INTRAMUSCULAR; INTRAVENOUS at 09:08

## 2022-08-25 RX ADMIN — MIRTAZAPINE 15 MG: 15 TABLET, FILM COATED ORAL at 09:08

## 2022-08-25 RX ADMIN — DULOXETINE 60 MG: 30 CAPSULE, DELAYED RELEASE ORAL at 08:08

## 2022-08-25 RX ADMIN — OXCARBAZEPINE 900 MG: 150 TABLET, FILM COATED ORAL at 09:08

## 2022-08-25 RX ADMIN — FLUTICASONE FUROATE AND VILANTEROL TRIFENATATE 1 PUFF: 100; 25 POWDER RESPIRATORY (INHALATION) at 07:08

## 2022-08-25 RX ADMIN — POTASSIUM BICARBONATE 25 MEQ: 977.5 TABLET, EFFERVESCENT ORAL at 08:08

## 2022-08-25 RX ADMIN — ONDANSETRON 4 MG: 2 INJECTION INTRAMUSCULAR; INTRAVENOUS at 05:08

## 2022-08-25 RX ADMIN — OXYCODONE HYDROCHLORIDE 30 MG: 10 TABLET ORAL at 09:08

## 2022-08-25 RX ADMIN — DOCUSATE SODIUM AND SENNOSIDES 1 TABLET: 8.6; 5 TABLET, FILM COATED ORAL at 08:08

## 2022-08-25 RX ADMIN — SODIUM CHLORIDE AND POTASSIUM CHLORIDE: .9; .15 SOLUTION INTRAVENOUS at 05:08

## 2022-08-25 RX ADMIN — DOCUSATE SODIUM AND SENNOSIDES 1 TABLET: 8.6; 5 TABLET, FILM COATED ORAL at 09:08

## 2022-08-25 RX ADMIN — ONDANSETRON 8 MG: 2 INJECTION INTRAMUSCULAR; INTRAVENOUS at 06:08

## 2022-08-25 NOTE — ASSESSMENT & PLAN NOTE
Pt reports ongoing LLQ pain x 2 weeks accompanied by intermittent N/V.   Will get CT abd/pelvis

## 2022-08-25 NOTE — PLAN OF CARE
Problem: Adult Inpatient Plan of Care  Goal: Patient-Specific Goal (Individualized)  Outcome: Adequate for Care Transition     Problem: Adult Inpatient Plan of Care  Goal: Plan of Care Review  Outcome: Adequate for Care Transition  Goal: Patient-Specific Goal (Individualized)  Outcome: Adequate for Care Transition  Goal: Absence of Hospital-Acquired Illness or Injury  Outcome: Adequate for Care Transition  Goal: Optimal Comfort and Wellbeing  Outcome: Adequate for Care Transition  Goal: Readiness for Transition of Care  Outcome: Adequate for Care Transition     Problem: Fluid and Electrolyte Imbalance (Acute Kidney Injury/Impairment)  Goal: Fluid and Electrolyte Balance  Outcome: Adequate for Care Transition     Problem: Oral Intake Inadequate (Acute Kidney Injury/Impairment)  Goal: Optimal Nutrition Intake  Outcome: Adequate for Care Transition     Problem: Renal Function Impairment (Acute Kidney Injury/Impairment)  Goal: Effective Renal Function  Outcome: Adequate for Care Transition     Problem: Impaired Wound Healing  Goal: Optimal Wound Healing  Outcome: Adequate for Care Transition

## 2022-08-25 NOTE — PLAN OF CARE
08/25/22 1155   MCCALLUM Message   Medicare Outpatient and Observation Notification regarding financial responsibility Explained to patient/caregiver;Signed/date by patient/caregiver   Date MCCALLUM was signed 08/25/22   Time MCCALLUM was signed 1154

## 2022-08-25 NOTE — H&P
[facility] Emergency Dept  [facility] Medicine  History & Physical    Patient Name: Ayana Anthony  MRN: [id number]  Patient Class: OP- Observation  Admission Date: 2022  Attending Physician: Jones Hector MD   Primary Care Provider: Rey Oconnell NP         Patient information was obtained from patient and ER records.     Subjective:     Principal Problem:ALEX (acute kidney injury)    Chief Complaint:   Chief Complaint   Patient presents with    abnormal lab work     Hypokalemia        HPI: Ayana Anthony is a 52-year-old female with past medical history significant for anemia, chronic kidney disease, hyperkalemia, hypertension, epilepsy, and chronic pain who presented to the emergency department at the direction of her pulmonologist.  She had an abnormal potassium of 2.6 this morning at her pulmonology appointment.  She reports LLQ abdominal pain, left flank pain, decreased urine output and difficulty voiding which has been ongoing for about 2 weeks.  No hematuria or dysuria.  Denies recent fevers or chest pain.  She feels generally weak and is dyspneic on exertion.  Surgical history includes gastric bypass, hysterectomy, cholecystectomy, and abdominoplasty.  Workup in the emergency department is significant for potassium 2.9 and creatinine 1.8.  She will be placed on observation the service of hospital Medicine for continued monitoring and treatment.      Past Medical History:   Diagnosis Date    B12 deficiency anemia     Chronic pain     CKD (chronic kidney disease) stage 3, GFR 30-59 ml/min     Depression     Epilepsy     Hematuria     Hypertension     Hypokalemia     Insomnia     Migraines     Proteinuria        Past Surgical History:   Procedure Laterality Date    APPENDECTOMY      BELT ABDOMINOPLASTY      breast implants  2013    BREAST SURGERY Bilateral          SECTION, LOW TRANSVERSE      x3    CHOLECYSTECTOMY      GASTRIC BYPASS      HYSTERECTOMY       REDUCTION MAMMAPLASTY      reduction and implants    TONSILLECTOMY, ADENOIDECTOMY         Review of patient's allergies indicates:   Allergen Reactions    Bactrim [sulfamethoxazole-trimethoprim] Itching    Tramadol     Vancomycin analogues Rash       No current facility-administered medications on file prior to encounter.     Current Outpatient Medications on File Prior to Encounter   Medication Sig    albuterol (PROAIR HFA) 90 mcg/actuation inhaler Inhale 2 puffs into the lungs every 4 (four) hours as needed for Wheezing or Shortness of Breath.    albuterol-ipratropium (DUO-NEB) 2.5 mg-0.5 mg/3 mL nebulizer solution Take 3 mLs by nebulization every 6 (six) hours as needed for Wheezing or Shortness of Breath. Rescue    amLODIPine (NORVASC) 10 MG tablet Take 0.5 tablets (5 mg total) by mouth once daily.    ascorbic acid, vitamin C, (VITAMIN C) 500 MG tablet Take 500 mg by mouth.    blood-glucose meter kit Can dispense meter covered by insurance.    cholecalciferol, vitamin D3, (VITAMIN D3) 5,000 unit Tab Take 5,000 Units by mouth once daily.    cyanocobalamin 1,000 mcg/mL injection Inject 1 mL (1,000 mcg total) into the skin every 28 days.    divalproex ER (DEPAKOTE) 500 MG Tb24 Take 500 mg by mouth every evening.    DULoxetine (CYMBALTA) 60 MG capsule Take 60 mg by mouth.    ergocalciferol (ERGOCALCIFEROL) 50,000 unit Cap Take 1 tablet weekly PO x 8 weeks, then every other week thereafter.    folic acid (FOLVITE) 1 MG tablet TAKE ONE Tablet BY MOUTH EVERY DAY THANK YOU (Patient not taking: Reported on 8/22/2022)    hydroCHLOROthiazide (HYDRODIURIL) 25 MG tablet Take 25 mg by mouth once daily.    ketorolac (TORADOL) 10 mg tablet Take 1 tablet (10 mg total) by mouth every 6 (six) hours.    levothyroxine (SYNTHROID) 125 MCG tablet Take 125 mcg by mouth.    metroNIDAZOLE (FLAGYL) 500 MG tablet Take 1 tablet (500 mg total) by mouth every 12 (twelve) hours.    mirtazapine (REMERON) 15 MG tablet Take 1  tablet by mouth every evening.    oxcarbazepine (TRILEPTAL) 600 MG Tab TAKE 1 & 1/2 TABLETS BY MOUTH TWICE DAILY    potassium chloride (MICRO-K) 10 MEQ CpSR Take 2 capsules (20 mEq total) by mouth 3 (three) times daily.    predniSONE (DELTASONE) 20 MG tablet Take 1 tablet (20 mg total) by mouth once daily.    PREMARIN 1.25 mg tablet TAKE 1 TABLET BY MOUTH EVERY DAY (Patient not taking: Reported on 8/22/2022)    promethazine (PHENERGAN) 25 MG tablet Take 1 tablet (25 mg total) by mouth every 4 (four) hours.    pyridoxine, vitamin B6, (B-6) 50 MG Tab Take 1 tablet (50 mg total) by mouth once daily.    rizatriptan (MAXALT) 10 MG tablet TAKE 1 TABLET BY MOUTH EVERY DAY AS NEEDED FOR HEADACHE, MAY REPEAT 1 DOSE AFTER 2 HOURS AS NEEDED , DO NOT EXCEED 2 PER DAY    sertraline (ZOLOFT) 100 MG tablet Take 200 mg by mouth once daily.    SYMBICORT 80-4.5 mcg/actuation HFAA Inhale 2 puffs into the lungs 2 (two) times daily.     Family History       Problem Relation (Age of Onset)    Breast cancer Mother    Cancer Mother    Diabetes Mother    Heart disease Mother    Hypertension Mother    Kidney disease Father    No Known Problems Sister, Brother, Daughter, Son          Tobacco Use    Smoking status: Never Smoker    Smokeless tobacco: Never Used   Substance and Sexual Activity    Alcohol use: No    Drug use: No    Sexual activity: Yes     Partners: Male     Review of Systems   Constitutional:  Positive for fatigue. Negative for chills and fever.   HENT:  Negative for congestion and rhinorrhea.    Respiratory:  Positive for shortness of breath. Negative for cough.    Cardiovascular:  Negative for chest pain and palpitations.   Gastrointestinal:  Positive for abdominal pain, nausea and vomiting. Negative for diarrhea.   Genitourinary:  Positive for difficulty urinating. Negative for hematuria.   Musculoskeletal:  Negative for gait problem and neck pain.   Skin:  Negative for pallor and rash.   Neurological:  Positive  for weakness (generalized). Negative for headaches.   Hematological:  Negative for adenopathy.   Psychiatric/Behavioral:  Negative for agitation and confusion.    All other systems reviewed and are negative.  Objective:     Vital Signs (Most Recent):  Temp: 98.4 °F (36.9 °C) (08/24/22 1616)  Pulse: 81 (08/24/22 1834)  Resp: 20 (08/24/22 1834)  BP: 114/63 (08/24/22 1834)  SpO2: 100 % (08/24/22 1834) Vital Signs (24h Range):  Temp:  [98.4 °F (36.9 °C)] 98.4 °F (36.9 °C)  Pulse:  [81-98] 81  Resp:  [18-20] 20  SpO2:  [95 %-100 %] 100 %  BP: (114-137)/(63-82) 114/63     Weight: 58.1 kg (128 lb)  Body mass index is 23.41 kg/m².    Physical Exam  Vitals and nursing note reviewed.   Constitutional:       General: She is not in acute distress.     Appearance: She is well-developed. She is ill-appearing.   HENT:      Head: Normocephalic and atraumatic.   Eyes:      Conjunctiva/sclera: Conjunctivae normal.      Pupils: Pupils are equal, round, and reactive to light.   Cardiovascular:      Rate and Rhythm: Normal rate and regular rhythm.   Pulmonary:      Effort: Pulmonary effort is normal. No respiratory distress.      Breath sounds: Normal breath sounds.   Abdominal:      General: Bowel sounds are normal. There is no distension.      Palpations: Abdomen is soft.      Tenderness: There is abdominal tenderness (LLQ/mild RLQ).   Genitourinary:     Comments: deferred  Musculoskeletal:         General: Normal range of motion.      Cervical back: Normal range of motion and neck supple.      Right lower leg: No edema.      Left lower leg: No edema.   Skin:     General: Skin is warm and dry.      Capillary Refill: Capillary refill takes 2 to 3 seconds.      Coloration: Skin is pale.      Findings: No rash.   Neurological:      General: No focal deficit present.      Mental Status: She is alert and oriented to person, place, and time.   Psychiatric:         Mood and Affect: Mood normal.         Behavior: Behavior normal.          Thought Content: Thought content normal.         Judgment: Judgment normal.         CRANIAL NERVES     CN III, IV, VI   Pupils are equal, round, and reactive to light.     Significant Labs: All pertinent labs within the past 24 hours have been reviewed.  CBC:   Recent Labs   Lab 08/24/22  1014   WBC 10.71   HGB 9.6*   HCT 28.3*        CMP:   Recent Labs   Lab 08/24/22  1014 08/24/22  1727   * 134*   K 2.6* 2.9*   CL 99 97   CO2 19* 20*   * 123*   BUN 50* 49*   CREATININE 1.8* 1.8*   CALCIUM 9.4 9.7   PROT 7.8  --    ALBUMIN 3.9  --    BILITOT 0.3  --    ALKPHOS 89  --    AST 16  --    ALT 21  --    ANIONGAP 17* 17*       Significant Imaging: I have reviewed all pertinent imaging results/findings within the past 24 hours.  CT chest:  Very slight atelectatic stranding    Assessment/Plan:     * ALEX (acute kidney injury)  Patient with acute kidney injury likely due to IVVD/dehydration ALEX is currently worsening. Labs reviewed- Renal function/electrolytes with Estimated Creatinine Clearance: 28.6 mL/min (A) (based on SCr of 1.8 mg/dL (H)). according to latest data. Monitor urine output and serial BMP and adjust therapy as needed. Avoid nephrotoxins and renally dose meds for GFR listed above.   IV fluid hydration  Check urine sodium, creatinine, and protein.  Urinalysis negative for infection.      Asthma  Continue chronic controller  PRN nebs  Supplemental oxygen as needed      Left lower quadrant abdominal pain  Pt reports ongoing LLQ pain x 2 weeks accompanied by intermittent N/V.   Will get CT abd/pelvis       Hypokalemia  Potassium 2.9 on admission.  Will replete and monitor, repleting as necessary.  Monitor telemetry.      Anemia  Patient's anemia is currently stable. S/p 0 units of PRBCs.  Current CBC reviewed-   Lab Results   Component Value Date    HGB 9.6 (L) 08/24/2022    HCT 28.3 (L) 08/24/2022    MCV 84 08/24/2022     08/24/2022     Monitor serial CBC and transfuse if patient  becomes hemodynamically unstable, symptomatic or H/H drops below 7/21.   Check iron, TIBC, Vit B12, ferritin in am        Epilepsy  Chronic; continue chronic antiepileptics.  Maintain fall and seizure precautions.        VTE Risk Mitigation (From admission, onward)    SCDs             ANTHONY Omer  Department of Hospital Medicine   [facility] - Emergency Dept

## 2022-08-25 NOTE — AI DETERIORATION ALERT
Artificial Intelligence Notification      Admit Date: 2022  LOS: 0  Code Status: Full Code   Date of Consult: 2022  : 1970  Age: 52 y.o.  Weight:   Wt Readings from Last 1 Encounters:   22 58.1 kg (128 lb)     Sex: female  Bed: 401/401 A:   MRN: [id number]  Attending Physician: Jones Hector MD  Primary Service: Networked reference to record PCT   Time AI Alert Received:   Time at Bedside:            Patient found sitting up in bed, digging through her purse and in no apparent distress. Respirations even and unlabored, HR 94. Vital signs rechecked and stable, and previously documented VS were in error. NSR on CM.       Vital Signs (Most Recent):  Temp: 98.6 °F (37 °C) (22)  Pulse: 95 (22)  Resp: 16 (22)  BP: (!) 141/68 (22)  SpO2: 98 % (22) Vital Signs (24h Range):  Temp:  [98.4 °F (36.9 °C)-98.6 °F (37 °C)] 98.6 °F (37 °C)  Pulse:  [81-98] 95  Resp:  [16-20] 16  SpO2:  [95 %-100 %] 98 %  BP: (114-141)/(63-82) 141/68         This encounter was triggered by an Artificial Intelligence Notification.     Artificial Intelligence alert discussed with Primary team:  Name Radha Allan NP

## 2022-08-25 NOTE — NURSING
IVF started. Medicated with Zofran for complaints of nausea after eating sandwich provided. Bed padded for seizure precautions. Call light at bedside.

## 2022-08-25 NOTE — NURSING
Nurses Note -- 4 Eyes      8/25/2022   4:13 AM      Skin assessed during: Admit      [] No Pressure Injuries Present    []Prevention Measures Documented      [x] Yes- Altered Skin Integrity Present or Discovered   [x] LDA Added if Not in Epic (Describe Wound)   [] New Altered Skin Integrity was Present on Admit and Documented in LDA   [] Wound Image Taken    Wound Care Consulted? No    Attending Nurse:  Tatyana Slaughter RN     Second RN/Staff Member:  Kat Schmid

## 2022-08-25 NOTE — DISCHARGE INSTRUCTIONS
As discussed, please continue to hydrate well orally, water is preferred.  Hold BP medication until you follow up with her primary care provider.  Monitor BP closely at home, record the values, and bring with you to your doctor's appointment.    Please obtain and start taking docusate (colace) daily for constipation.  Purchase miralax and if colace alone is not effective, add the miralax.    Discharge Instructions, [facility] Medicine    Thank you for choosing [facility] for your medical care. The primary doctor who is taking care of you at the time of your discharge is Alexey Lion MD.     You were admitted to the hospital with ALEX (acute kidney injury).     Please note your discharge instructions, including diet/activity restrictions, follow-up appointments, and medication changes.  If you have any questions about your medical issues, prescriptions, or any other questions, please feel free to contact the [facility] Medicine Dept at [phone] and we will help.    If you are previously with Home health, outpatient PT/OT or under a therapy program, you are cleared to return to those programs.    Please direct all long term medication refills and follow up to your primary care provider, Rey Oconnell NP. Thank you again for letting us take care of your health care needs.    Please note the following discharge instructions per your discharging physician-  Eve Irwin NP

## 2022-08-25 NOTE — SUBJECTIVE & OBJECTIVE
Past Medical History:   Diagnosis Date    B12 deficiency anemia     Chronic pain     CKD (chronic kidney disease) stage 3, GFR 30-59 ml/min     Depression     Epilepsy     Hematuria     Hypertension     Hypokalemia     Insomnia     Migraines     Proteinuria        Past Surgical History:   Procedure Laterality Date    APPENDECTOMY      BELT ABDOMINOPLASTY      breast implants  2013    BREAST SURGERY Bilateral     2013     SECTION, LOW TRANSVERSE      x3    CHOLECYSTECTOMY      GASTRIC BYPASS      HYSTERECTOMY      REDUCTION MAMMAPLASTY      reduction and implants    TONSILLECTOMY, ADENOIDECTOMY         Review of patient's allergies indicates:   Allergen Reactions    Bactrim [sulfamethoxazole-trimethoprim] Itching    Tramadol     Vancomycin analogues Rash       No current facility-administered medications on file prior to encounter.     Current Outpatient Medications on File Prior to Encounter   Medication Sig    albuterol (PROAIR HFA) 90 mcg/actuation inhaler Inhale 2 puffs into the lungs every 4 (four) hours as needed for Wheezing or Shortness of Breath.    albuterol-ipratropium (DUO-NEB) 2.5 mg-0.5 mg/3 mL nebulizer solution Take 3 mLs by nebulization every 6 (six) hours as needed for Wheezing or Shortness of Breath. Rescue    amLODIPine (NORVASC) 10 MG tablet Take 0.5 tablets (5 mg total) by mouth once daily.    ascorbic acid, vitamin C, (VITAMIN C) 500 MG tablet Take 500 mg by mouth.    blood-glucose meter kit Can dispense meter covered by insurance.    cholecalciferol, vitamin D3, (VITAMIN D3) 5,000 unit Tab Take 5,000 Units by mouth once daily.    cyanocobalamin 1,000 mcg/mL injection Inject 1 mL (1,000 mcg total) into the skin every 28 days.    divalproex ER (DEPAKOTE) 500 MG Tb24 Take 500 mg by mouth every evening.    DULoxetine (CYMBALTA) 60 MG capsule Take 60 mg by mouth.    ergocalciferol (ERGOCALCIFEROL) 50,000 unit Cap Take 1 tablet weekly PO x 8 weeks, then every other week thereafter.     folic acid (FOLVITE) 1 MG tablet TAKE ONE Tablet BY MOUTH EVERY DAY THANK YOU (Patient not taking: Reported on 8/22/2022)    hydroCHLOROthiazide (HYDRODIURIL) 25 MG tablet Take 25 mg by mouth once daily.    ketorolac (TORADOL) 10 mg tablet Take 1 tablet (10 mg total) by mouth every 6 (six) hours.    levothyroxine (SYNTHROID) 125 MCG tablet Take 125 mcg by mouth.    metroNIDAZOLE (FLAGYL) 500 MG tablet Take 1 tablet (500 mg total) by mouth every 12 (twelve) hours.    mirtazapine (REMERON) 15 MG tablet Take 1 tablet by mouth every evening.    oxcarbazepine (TRILEPTAL) 600 MG Tab TAKE 1 & 1/2 TABLETS BY MOUTH TWICE DAILY    potassium chloride (MICRO-K) 10 MEQ CpSR Take 2 capsules (20 mEq total) by mouth 3 (three) times daily.    predniSONE (DELTASONE) 20 MG tablet Take 1 tablet (20 mg total) by mouth once daily.    PREMARIN 1.25 mg tablet TAKE 1 TABLET BY MOUTH EVERY DAY (Patient not taking: Reported on 8/22/2022)    promethazine (PHENERGAN) 25 MG tablet Take 1 tablet (25 mg total) by mouth every 4 (four) hours.    pyridoxine, vitamin B6, (B-6) 50 MG Tab Take 1 tablet (50 mg total) by mouth once daily.    rizatriptan (MAXALT) 10 MG tablet TAKE 1 TABLET BY MOUTH EVERY DAY AS NEEDED FOR HEADACHE, MAY REPEAT 1 DOSE AFTER 2 HOURS AS NEEDED , DO NOT EXCEED 2 PER DAY    sertraline (ZOLOFT) 100 MG tablet Take 200 mg by mouth once daily.    SYMBICORT 80-4.5 mcg/actuation HFAA Inhale 2 puffs into the lungs 2 (two) times daily.     Family History       Problem Relation (Age of Onset)    Breast cancer Mother    Cancer Mother    Diabetes Mother    Heart disease Mother    Hypertension Mother    Kidney disease Father    No Known Problems Sister, Brother, Daughter, Son          Tobacco Use    Smoking status: Never Smoker    Smokeless tobacco: Never Used   Substance and Sexual Activity    Alcohol use: No    Drug use: No    Sexual activity: Yes     Partners: Male     Review of Systems   Constitutional:  Positive for fatigue. Negative  for chills and fever.   HENT:  Negative for congestion and rhinorrhea.    Respiratory:  Positive for shortness of breath. Negative for cough.    Cardiovascular:  Negative for chest pain and palpitations.   Gastrointestinal:  Positive for abdominal pain, nausea and vomiting. Negative for diarrhea.   Genitourinary:  Positive for difficulty urinating. Negative for hematuria.   Musculoskeletal:  Negative for gait problem and neck pain.   Skin:  Negative for pallor and rash.   Neurological:  Positive for weakness (generalized). Negative for headaches.   Hematological:  Negative for adenopathy.   Psychiatric/Behavioral:  Negative for agitation and confusion.    All other systems reviewed and are negative.  Objective:     Vital Signs (Most Recent):  Temp: 98.4 °F (36.9 °C) (08/24/22 1616)  Pulse: 81 (08/24/22 1834)  Resp: 20 (08/24/22 1834)  BP: 114/63 (08/24/22 1834)  SpO2: 100 % (08/24/22 1834) Vital Signs (24h Range):  Temp:  [98.4 °F (36.9 °C)] 98.4 °F (36.9 °C)  Pulse:  [81-98] 81  Resp:  [18-20] 20  SpO2:  [95 %-100 %] 100 %  BP: (114-137)/(63-82) 114/63     Weight: 58.1 kg (128 lb)  Body mass index is 23.41 kg/m².    Physical Exam  Vitals and nursing note reviewed.   Constitutional:       General: She is not in acute distress.     Appearance: She is well-developed. She is ill-appearing.   HENT:      Head: Normocephalic and atraumatic.   Eyes:      Conjunctiva/sclera: Conjunctivae normal.      Pupils: Pupils are equal, round, and reactive to light.   Cardiovascular:      Rate and Rhythm: Normal rate and regular rhythm.   Pulmonary:      Effort: Pulmonary effort is normal. No respiratory distress.      Breath sounds: Normal breath sounds.   Abdominal:      General: Bowel sounds are normal. There is no distension.      Palpations: Abdomen is soft.      Tenderness: There is abdominal tenderness (LLQ/mild RLQ).   Genitourinary:     Comments: deferred  Musculoskeletal:         General: Normal range of motion.       Cervical back: Normal range of motion and neck supple.      Right lower leg: No edema.      Left lower leg: No edema.   Skin:     General: Skin is warm and dry.      Capillary Refill: Capillary refill takes 2 to 3 seconds.      Coloration: Skin is pale.      Findings: No rash.   Neurological:      General: No focal deficit present.      Mental Status: She is alert and oriented to person, place, and time.   Psychiatric:         Mood and Affect: Mood normal.         Behavior: Behavior normal.         Thought Content: Thought content normal.         Judgment: Judgment normal.         CRANIAL NERVES     CN III, IV, VI   Pupils are equal, round, and reactive to light.     Significant Labs: All pertinent labs within the past 24 hours have been reviewed.  CBC:   Recent Labs   Lab 08/24/22  1014   WBC 10.71   HGB 9.6*   HCT 28.3*        CMP:   Recent Labs   Lab 08/24/22  1014 08/24/22  1727   * 134*   K 2.6* 2.9*   CL 99 97   CO2 19* 20*   * 123*   BUN 50* 49*   CREATININE 1.8* 1.8*   CALCIUM 9.4 9.7   PROT 7.8  --    ALBUMIN 3.9  --    BILITOT 0.3  --    ALKPHOS 89  --    AST 16  --    ALT 21  --    ANIONGAP 17* 17*       Significant Imaging: I have reviewed all pertinent imaging results/findings within the past 24 hours.  CT chest:  Very slight atelectatic stranding

## 2022-08-25 NOTE — ASSESSMENT & PLAN NOTE
Patient's anemia is currently stable. S/p 0 units of PRBCs.  Current CBC reviewed-   Lab Results   Component Value Date    HGB 9.6 (L) 08/24/2022    HCT 28.3 (L) 08/24/2022    MCV 84 08/24/2022     08/24/2022     Monitor serial CBC and transfuse if patient becomes hemodynamically unstable, symptomatic or H/H drops below 7/21.   Check iron, TIBC, Vit B12, ferritin in am

## 2022-08-25 NOTE — NURSING
Patient arrived via wc, ambulated around room well, complains of dizziness occasionally. C/O shortness of breath when ambulating, 98% room air. Oriented to room, orders explained. Call light at bedside. Bed alarm in use..HS snack provided. VS stable.

## 2022-08-25 NOTE — PLAN OF CARE
POC discussed with patient, verbalized understanding. Patient with uneventful night, slept well between care. VS stable. Medicated X 1 with Oxycodone 30mg for chronic back pain. Ambulated to bathroom X 3 with stand by assist, tolerated well. IVF infusing. Received oral K+. SR on telemetry. Tolerated HS snack. Call light at bedside. Bed alarm in use.

## 2022-08-25 NOTE — PLAN OF CARE
[facility]  Initial Discharge Assessment       Primary Care Provider: Rey Oconnell NP    Admission Diagnosis: ALEX (acute kidney injury) [N17.9]    Admission Date: 8/24/2022  Expected Discharge Date: 8/25/2022    SW met with pt at bedside to complete discharge assessment, verified PCP, pharmacy and information on facesheet.  No HH, DME or dialysis.  SisterMar will drive pt home. No needs identified at this time.    Discharge Barriers Identified: None    Payor: MEDICARE / Plan: MEDICARE PART A & B / Product Type: Government /     Extended Emergency Contact Information  Primary Emergency Contact: Bozena Angeles  Address: [address] [place] [address] [facility]  Mobile Phone: [phone]  Relation: Daughter    Discharge Plan A: Home  Discharge Plan B: Home      [facility] [place] - [address]  [address]  [place] [address]  Phone: [phone] Fax: [phone]    [facility] [place] [address] [facility] [address] [place]  [address] [place]  [place] [address]  Phone: [phone] Fax: [phone]      Initial Assessment (most recent)     Adult Discharge Assessment - 08/25/22 1327        Discharge Assessment    Assessment Type Discharge Planning Assessment     Confirmed/corrected address, phone number and insurance Yes     Confirmed Demographics Correct on Facesheet     Source of Information patient     Does patient/caregiver understand observation status Yes     Communicated AKI with patient/caregiver No     Lives With child(giorgio), adult;grandchild(giorgio)     Do you expect to return to your current living situation? Yes     Prior to hospitilization cognitive status: Alert/Oriented     Current cognitive status: Infant/Toddler     Walking or Climbing Stairs Difficulty stair climbing difficulty, assistance 1 person     Dressing/Bathing Difficulty none     Equipment Currently Used at Home none     Readmission within 30 days? No      Patient currently being followed by outpatient case management? No     Do you currently have service(s) that help you manage your care at home? No     Do you take prescription medications? Yes     Do you have prescription coverage? No     Coverage daughter assists with meds purchase     Do you have any problems affording any of your prescribed medications? No     Is the patient taking medications as prescribed? yes     Who is going to help you get home at discharge? sister, Mar     How do you get to doctors appointments? family or friend will provide     Are you on dialysis? No     Do you take coumadin? No     Discharge Plan A Home     Discharge Plan B Home     DME Needed Upon Discharge  none     Discharge Plan discussed with: Patient     Discharge Barriers Identified None

## 2022-08-25 NOTE — CARE UPDATE
08/25/22 0727   Patient Assessment/Suction   Level of Consciousness (AVPU) alert   Respiratory Effort Normal;Unlabored   Expansion/Accessory Muscles/Retractions no use of accessory muscles;no retractions;expansion symmetric   All Lung Fields Breath Sounds clear;diminished   Rhythm/Pattern, Respiratory unlabored;pattern regular;depth regular   Cough Frequency infrequent   Cough Type good;nonproductive   PRE-TX-O2   O2 Device (Oxygen Therapy) room air   SpO2 98 %   Pulse Oximetry Type Intermittent   $ Pulse Oximetry - Multiple Charge Pulse Oximetry - Multiple   Pulse 84   Resp 16   Aerosol Therapy   $ Aerosol Therapy Charges PRN treatment not required   Respiratory Treatment Status (SVN) PRN treatment not required   Inhaler   $ Inhaler Charges MDI (Metered Dose Inahler) Treatment;Mouth rinsed post treatment   Daily Review of Necessity (Inhaler) completed   Respiratory Treatment Status (Inhaler) given   Treatment Route (Inhaler) mouthpiece   Patient Position (Inhaler) HOB elevated   Post Treatment Assessment (Inhaler) breath sounds unchanged   Signs of Intolerance (Inhaler) none   Breath Sounds Post-Respiratory Treatment   Throughout All Fields Post-Treatment All Fields   Throughout All Fields Post-Treatment no change   Post-treatment Heart Rate (beats/min) 85   Post-treatment Resp Rate (breaths/min) 16       Aerosol treatments q4PRN with Duoneb. Breo q day.

## 2022-08-25 NOTE — CARE UPDATE
08/25/22 0033   Patient Assessment/Suction   Level of Consciousness (AVPU) responds to voice   Respiratory Effort Normal;Unlabored   Expansion/Accessory Muscles/Retractions expansion symmetric;no retractions;no use of accessory muscles   PRE-TX-O2   O2 Device (Oxygen Therapy) room air   SpO2 98 %   Pulse Oximetry Type Intermittent   Pulse 89   Resp 18   Aerosol Therapy   $ Aerosol Therapy Charges PRN treatment not required   Respiratory Treatment Status (SVN) PRN treatment not required

## 2022-08-26 ENCOUNTER — PATIENT MESSAGE (OUTPATIENT)
Dept: PULMONOLOGY | Facility: CLINIC | Age: 52
End: 2022-08-26
Payer: MEDICARE

## 2022-08-26 PROBLEM — K59.03 THERAPEUTIC OPIOID-INDUCED CONSTIPATION (OIC): Status: ACTIVE | Noted: 2022-08-26

## 2022-08-26 PROBLEM — T40.2X5A THERAPEUTIC OPIOID-INDUCED CONSTIPATION (OIC): Status: ACTIVE | Noted: 2022-08-26

## 2022-08-26 PROBLEM — I95.1 ORTHOSTATIC HYPOTENSION: Status: ACTIVE | Noted: 2022-08-26

## 2022-08-26 LAB
ANION GAP SERPL CALC-SCNC: 8 MMOL/L (ref 8–16)
BASOPHILS # BLD AUTO: 0.09 K/UL (ref 0–0.2)
BASOPHILS NFR BLD: 1.2 % (ref 0–1.9)
BUN SERPL-MCNC: 23 MG/DL (ref 6–20)
CALCIUM SERPL-MCNC: 8.3 MG/DL (ref 8.7–10.5)
CHLORIDE SERPL-SCNC: 113 MMOL/L (ref 95–110)
CO2 SERPL-SCNC: 19 MMOL/L (ref 23–29)
CREAT SERPL-MCNC: 1 MG/DL (ref 0.5–1.4)
DIFFERENTIAL METHOD: ABNORMAL
EOSINOPHIL # BLD AUTO: 0.6 K/UL (ref 0–0.5)
EOSINOPHIL NFR BLD: 8.3 % (ref 0–8)
ERYTHROCYTE [DISTWIDTH] IN BLOOD BY AUTOMATED COUNT: 17.2 % (ref 11.5–14.5)
EST. GFR  (NO RACE VARIABLE): >60 ML/MIN/1.73 M^2
GLUCOSE SERPL-MCNC: 76 MG/DL (ref 70–110)
HCT VFR BLD AUTO: 25.9 % (ref 37–48.5)
HGB BLD-MCNC: 8.3 G/DL (ref 12–16)
IMM GRANULOCYTES # BLD AUTO: 0.11 K/UL (ref 0–0.04)
IMM GRANULOCYTES NFR BLD AUTO: 1.4 % (ref 0–0.5)
INTERPRETATION UR IFE-IMP: NORMAL
LYMPHOCYTES # BLD AUTO: 2.3 K/UL (ref 1–4.8)
LYMPHOCYTES NFR BLD: 29.3 % (ref 18–48)
MAGNESIUM SERPL-MCNC: 2.1 MG/DL (ref 1.6–2.6)
MCH RBC QN AUTO: 28.2 PG (ref 27–31)
MCHC RBC AUTO-ENTMCNC: 32 G/DL (ref 32–36)
MCV RBC AUTO: 88 FL (ref 82–98)
MONOCYTES # BLD AUTO: 1.4 K/UL (ref 0.3–1)
MONOCYTES NFR BLD: 17.6 % (ref 4–15)
NEUTROPHILS # BLD AUTO: 3.3 K/UL (ref 1.8–7.7)
NEUTROPHILS NFR BLD: 42.2 % (ref 38–73)
NRBC BLD-RTO: 0 /100 WBC
PLATELET # BLD AUTO: 449 K/UL (ref 150–450)
PMV BLD AUTO: 9.8 FL (ref 9.2–12.9)
POTASSIUM SERPL-SCNC: 3.8 MMOL/L (ref 3.5–5.1)
RBC # BLD AUTO: 2.94 M/UL (ref 4–5.4)
SODIUM SERPL-SCNC: 140 MMOL/L (ref 136–145)
WBC # BLD AUTO: 7.74 K/UL (ref 3.9–12.7)

## 2022-08-26 PROCEDURE — 63600175 PHARM REV CODE 636 W HCPCS: Performed by: NURSE PRACTITIONER

## 2022-08-26 PROCEDURE — 94761 N-INVAS EAR/PLS OXIMETRY MLT: CPT

## 2022-08-26 PROCEDURE — 99900035 HC TECH TIME PER 15 MIN (STAT)

## 2022-08-26 PROCEDURE — 36415 COLL VENOUS BLD VENIPUNCTURE: CPT | Performed by: NURSE PRACTITIONER

## 2022-08-26 PROCEDURE — 12000002 HC ACUTE/MED SURGE SEMI-PRIVATE ROOM

## 2022-08-26 PROCEDURE — 85025 COMPLETE CBC W/AUTO DIFF WBC: CPT | Performed by: NURSE PRACTITIONER

## 2022-08-26 PROCEDURE — 25000003 PHARM REV CODE 250: Performed by: NURSE PRACTITIONER

## 2022-08-26 PROCEDURE — 83735 ASSAY OF MAGNESIUM: CPT | Performed by: NURSE PRACTITIONER

## 2022-08-26 PROCEDURE — 94640 AIRWAY INHALATION TREATMENT: CPT

## 2022-08-26 PROCEDURE — 80048 BASIC METABOLIC PNL TOTAL CA: CPT | Performed by: NURSE PRACTITIONER

## 2022-08-26 RX ORDER — ONDANSETRON 2 MG/ML
8 INJECTION INTRAMUSCULAR; INTRAVENOUS EVERY 6 HOURS
Status: DISCONTINUED | OUTPATIENT
Start: 2022-08-26 | End: 2022-08-27

## 2022-08-26 RX ADMIN — ACETAMINOPHEN 650 MG: 325 TABLET ORAL at 08:08

## 2022-08-26 RX ADMIN — OXYCODONE HYDROCHLORIDE 30 MG: 10 TABLET ORAL at 08:08

## 2022-08-26 RX ADMIN — SODIUM CHLORIDE AND POTASSIUM CHLORIDE: .9; .15 SOLUTION INTRAVENOUS at 05:08

## 2022-08-26 RX ADMIN — SODIUM CHLORIDE AND POTASSIUM CHLORIDE: .9; .15 SOLUTION INTRAVENOUS at 12:08

## 2022-08-26 RX ADMIN — LACTULOSE 20 G: 20 SOLUTION ORAL at 01:08

## 2022-08-26 RX ADMIN — SODIUM CHLORIDE 1000 ML: 0.9 INJECTION, SOLUTION INTRAVENOUS at 10:08

## 2022-08-26 RX ADMIN — MIRTAZAPINE 15 MG: 15 TABLET, FILM COATED ORAL at 08:08

## 2022-08-26 RX ADMIN — LEVOTHYROXINE SODIUM 125 MCG: 0.03 TABLET ORAL at 05:08

## 2022-08-26 RX ADMIN — OXYCODONE HYDROCHLORIDE AND ACETAMINOPHEN 500 MG: 500 TABLET ORAL at 08:08

## 2022-08-26 RX ADMIN — ONDANSETRON 8 MG: 2 INJECTION INTRAMUSCULAR; INTRAVENOUS at 01:08

## 2022-08-26 RX ADMIN — OXCARBAZEPINE 900 MG: 150 TABLET, FILM COATED ORAL at 08:08

## 2022-08-26 RX ADMIN — PROMETHAZINE HYDROCHLORIDE 12.5 MG: 25 INJECTION INTRAMUSCULAR; INTRAVENOUS at 12:08

## 2022-08-26 RX ADMIN — OXYCODONE HYDROCHLORIDE 30 MG: 10 TABLET ORAL at 06:08

## 2022-08-26 RX ADMIN — FLUTICASONE FUROATE AND VILANTEROL TRIFENATATE 1 PUFF: 100; 25 POWDER RESPIRATORY (INHALATION) at 06:08

## 2022-08-26 RX ADMIN — ONDANSETRON 8 MG: 2 INJECTION INTRAMUSCULAR; INTRAVENOUS at 09:08

## 2022-08-26 RX ADMIN — DOCUSATE SODIUM AND SENNOSIDES 1 TABLET: 8.6; 5 TABLET, FILM COATED ORAL at 08:08

## 2022-08-26 RX ADMIN — ONDANSETRON 8 MG: 2 INJECTION INTRAMUSCULAR; INTRAVENOUS at 11:08

## 2022-08-26 RX ADMIN — SODIUM CHLORIDE AND POTASSIUM CHLORIDE: .9; .15 SOLUTION INTRAVENOUS at 06:08

## 2022-08-26 RX ADMIN — DULOXETINE 60 MG: 30 CAPSULE, DELAYED RELEASE ORAL at 08:08

## 2022-08-26 RX ADMIN — ONDANSETRON 8 MG: 2 INJECTION INTRAMUSCULAR; INTRAVENOUS at 05:08

## 2022-08-26 RX ADMIN — ACETAMINOPHEN 650 MG: 325 TABLET ORAL at 12:08

## 2022-08-26 NOTE — PROGRESS NOTES
[facility] Hospital Medicine  Progress Note    Patient Name: Ayana Anthony  MRN: [id number]  Patient Class: OP- Observation   Admission Date: 8/24/2022  Length of Stay: 0 days  Attending Physician: Alexey Lion MD  Primary Care Provider: Rey Oconnell NP        Subjective:     Principal Problem:ALEX (acute kidney injury)        HPI:  Ayana Anthony is a 52-year-old female with past medical history significant for anemia, chronic kidney disease, hyperkalemia, hypertension, epilepsy, and chronic pain who presented to the emergency department at the direction of her pulmonologist.  She had an abnormal potassium of 2.6 this morning at her pulmonology appointment.  She reports LLQ abdominal pain, left flank pain, decreased urine output and difficulty voiding which has been ongoing for about 2 weeks.  No hematuria or dysuria.  Denies recent fevers or chest pain.  She feels generally weak and is dyspneic on exertion.  Surgical history includes gastric bypass, hysterectomy, cholecystectomy, and abdominoplasty.  Workup in the emergency department is significant for potassium 2.9 and creatinine 1.8.  She will be placed on observation the service of hospital Medicine for continued monitoring and treatment.      Overview/Hospital Course:  No notes on file    Interval History:  Pt seen and examined.  Continues with low BP readings dropping into the low 80s with standing.  She continues with nausea and vomited this morning.  She is now having bowel movements.  She is afebrile.  Will need aggressive fluid resuscitation      Review of Systems   Constitutional:  Positive for fatigue. Negative for chills and fever.   HENT:  Negative for trouble swallowing.    Respiratory:  Negative for cough and shortness of breath.    Gastrointestinal:  Positive for nausea and vomiting.   Genitourinary:  Negative for difficulty urinating and dysuria.   Neurological:  Positive for dizziness and light-headedness.    Objective:     Vital Signs (Most Recent):  Temp: 98.4 °F (36.9 °C) (08/26/22 0718)  Pulse: 104 (08/26/22 0908)  Resp: 16 (08/26/22 0850)  BP: (!) 80/51 (08/26/22 0908)  SpO2: 96 % (08/26/22 0718)   Vital Signs (24h Range):  Temp:  [97.6 °F (36.4 °C)-98.4 °F (36.9 °C)] 98.4 °F (36.9 °C)  Pulse:  [] 104  Resp:  [15-20] 16  SpO2:  [94 %-98 %] 96 %  BP: ()/(51-58) 80/51     Weight: 58.1 kg (128 lb)  Body mass index is 23.41 kg/m².    Intake/Output Summary (Last 24 hours) at 8/26/2022 1005  Last data filed at 8/26/2022 0500  Gross per 24 hour   Intake 1995 ml   Output --   Net 1995 ml      Physical Exam  Constitutional:       Appearance: Normal appearance.   HENT:      Head: Normocephalic.   Eyes:      Extraocular Movements: Extraocular movements intact.      Conjunctiva/sclera: Conjunctivae normal.      Pupils: Pupils are equal, round, and reactive to light.   Cardiovascular:      Rate and Rhythm: Normal rate and regular rhythm.      Pulses: Normal pulses.      Heart sounds: Normal heart sounds.   Pulmonary:      Effort: Pulmonary effort is normal.      Breath sounds: Normal breath sounds.   Abdominal:      General: Abdomen is flat. Bowel sounds are normal.      Palpations: Abdomen is soft.   Musculoskeletal:      Cervical back: Normal range of motion.   Skin:     General: Skin is warm and dry.   Neurological:      General: No focal deficit present.      Mental Status: She is alert and oriented to person, place, and time. Mental status is at baseline.       Significant Labs: All pertinent labs within the past 24 hours have been reviewed.  CBC:   Recent Labs   Lab 08/24/22  1014   WBC 10.71   HGB 9.6*   HCT 28.3*        CMP:   Recent Labs   Lab 08/24/22  1014 08/24/22  1727 08/25/22  0402 08/25/22  1235 08/26/22  0450   *   < > 136 137 140   K 2.6*   < > 3.1* 3.9 3.8   CL 99   < > 105 107 113*   CO2 19*   < > 20* 19* 19*   *   < > 79 91 76   BUN 50*   < > 40* 33* 23*   CREATININE 1.8*    < > 1.3 1.3 1.0   CALCIUM 9.4   < > 8.5* 8.5* 8.3*   PROT 7.8  --   --   --   --    ALBUMIN 3.9  --   --   --   --    BILITOT 0.3  --   --   --   --    ALKPHOS 89  --   --   --   --    AST 16  --   --   --   --    ALT 21  --   --   --   --    ANIONGAP 17*   < > 11 11 8    < > = values in this interval not displayed.       Significant Imaging: I have reviewed all pertinent imaging results/findings within the past 24 hours.      Assessment/Plan:      * ALEX (acute kidney injury)  Patient with acute kidney injury likely due to IVVD/dehydration ALEX is currently improving. Labs reviewed- Renal function/electrolytes with Estimated Creatinine Clearance: 51.5 mL/min (based on SCr of 1 mg/dL). according to latest data. Monitor urine output and serial BMP and adjust therapy as needed. Avoid nephrotoxins and renally dose meds for GFR listed above.   IV fluid hydration  Urinalysis negative for infection.      Orthostatic hypotension  Persistent with drops in BP to 80s despite ongoing fluid resuscitation.  -Place compression stockings  -1L NS bolus  -Repeat orthostatics      Therapeutic opioid-induced constipation (OIC)  -Continue senna  -Added lactulose  -Will need aggressive bowel regimen while on oral narcotics  -She is having BMs today.    Asthma  Continue chronic controller  PRN nebs  Supplemental oxygen as needed      Hypokalemia  Improving with repletion      Anemia  Patient's anemia is currently stable. S/p 0 units of PRBCs.  Current CBC reviewed-   Lab Results   Component Value Date    HGB 9.6 (L) 08/24/2022    HCT 28.3 (L) 08/24/2022    MCV 84 08/24/2022     08/24/2022     Monitor serial CBC and transfuse if patient becomes hemodynamically unstable, symptomatic or H/H drops below 7/21.           Epilepsy  Chronic; continue chronic antiepileptics.  Maintain fall and seizure precautions.        VTE Risk Mitigation (From admission, onward)         Ordered     Place CRISTINE hose  Until discontinued         08/26/22 1011      IP VTE LOW RISK PATIENT  Once         08/24/22 2113     Place sequential compression device  Until discontinued         08/24/22 2113                Discharge Planning   AKI: 8/26/2022     Code Status: Full Code   Is the patient medically ready for discharge?:     Reason for patient still in hospital (select all that apply): Patient new problem, Patient trending condition and Treatment  Discharge Plan A: Home                  Eve Irwin NP  Department of Hospital Medicine   [facility]

## 2022-08-26 NOTE — PLAN OF CARE
"Plan of care review with pt, pt states "understanding," IVF infusing without difficulty, extra bolus administered for low BP, no redness/swelling noted to IV site, denies feeling of dizziness at this time, ambulating ad sarah, +2 edema palpated to BLE, BLE elevated while at rest, pain is controlled with current regimen, will continue to monitor, observe and note any changes, safety maintain    "

## 2022-08-26 NOTE — PLAN OF CARE
Discharge was planned for today however pt was actively vomiting during MDR's and blood pressure dropped to the 80's. Per Eve, MICHELL pt will stay for the night. CM following. I tried to schedule a PCP appt with Dr. Oconnell at [phone] but there was no answer. CM following.    08/26/22 1238   Discharge Assessment   Assessment Type Discharge Planning Reassessment

## 2022-08-26 NOTE — PROGRESS NOTES
[facility] Hospital Medicine  Progress Note    Patient Name: Ayana Anthony  MRN: [id number]  Patient Class: OP- Observation   Admission Date: 8/24/2022  Length of Stay: 0 days  Attending Physician: Alexey Lion MD  Primary Care Provider: Rey Oconnell NP        Subjective:     Principal Problem:ALEX (acute kidney injury)        HPI:  Ayana Anthony is a 52-year-old female with past medical history significant for anemia, chronic kidney disease, hyperkalemia, hypertension, epilepsy, and chronic pain who presented to the emergency department at the direction of her pulmonologist.  She had an abnormal potassium of 2.6 this morning at her pulmonology appointment.  She reports LLQ abdominal pain, left flank pain, decreased urine output and difficulty voiding which has been ongoing for about 2 weeks.  No hematuria or dysuria.  Denies recent fevers or chest pain.  She feels generally weak and is dyspneic on exertion.  Surgical history includes gastric bypass, hysterectomy, cholecystectomy, and abdominoplasty.  Workup in the emergency department is significant for potassium 2.9 and creatinine 1.8.  She will be placed on observation the service of hospital Medicine for continued monitoring and treatment.      Overview/Hospital Course:  No notes on file    Interval History: Patient seen and examined.  She reports ongoing nausea despite zofran.  CT reviewed with patient- copious amounts of stool concerning for opioid induced constipation.  Her ALEX is improving and K is improving.  Bowel regimen ordered.  IVF bolus given for orthostatic hypotension.  Plan was discharge this evening, but called late in shift that patient was actively vomiting and did not feel well enough to go home.  She is declining enema.    Review of Systems   Constitutional:  Positive for fatigue. Negative for fever.   Respiratory:  Negative for cough and shortness of breath.    Cardiovascular:  Negative for chest pain.    Gastrointestinal:  Positive for abdominal pain, constipation, nausea and vomiting.   Genitourinary:  Negative for decreased urine volume and dysuria.   Musculoskeletal:  Negative for arthralgias and myalgias.   Psychiatric/Behavioral:  Negative for confusion. The patient is not nervous/anxious.    Objective:     Vital Signs (Most Recent):  Temp: 98.1 °F (36.7 °C) (08/26/22 0411)  Pulse: 79 (08/26/22 0411)  Resp: 15 (08/26/22 0411)  BP: (!) 99/58 (08/26/22 0411)  SpO2: 95 % (08/26/22 0411)   Vital Signs (24h Range):  Temp:  [97.6 °F (36.4 °C)-98.4 °F (36.9 °C)] 98.1 °F (36.7 °C)  Pulse:  [] 79  Resp:  [15-20] 15  SpO2:  [94 %-98 %] 95 %  BP: ()/(51-58) 99/58     Weight: 58.1 kg (128 lb)  Body mass index is 23.41 kg/m².    Intake/Output Summary (Last 24 hours) at 8/26/2022 0610  Last data filed at 8/26/2022 0500  Gross per 24 hour   Intake 2235 ml   Output --   Net 2235 ml      Physical Exam  Vitals and nursing note reviewed.   Constitutional:       Appearance: Normal appearance.   HENT:      Head: Normocephalic and atraumatic.      Mouth/Throat:      Mouth: Mucous membranes are dry.   Eyes:      Extraocular Movements: Extraocular movements intact.      Pupils: Pupils are equal, round, and reactive to light.   Cardiovascular:      Rate and Rhythm: Normal rate and regular rhythm.   Pulmonary:      Effort: Pulmonary effort is normal.      Breath sounds: Normal breath sounds.   Abdominal:      General: Abdomen is flat. Bowel sounds are normal.      Palpations: Abdomen is soft.      Tenderness: There is abdominal tenderness (no guarding).   Musculoskeletal:         General: Normal range of motion.   Skin:     General: Skin is warm and dry.      Capillary Refill: Capillary refill takes less than 2 seconds.   Neurological:      General: No focal deficit present.      Mental Status: She is alert.   Psychiatric:         Mood and Affect: Mood normal.       Significant Labs: All pertinent labs within the past 24  hours have been reviewed.    Significant Imaging: I have reviewed all pertinent imaging results/findings within the past 24 hours.      Assessment/Plan:      * ALEX (acute kidney injury)  Patient with acute kidney injury likely due to IVVD/dehydration ALEX is currently improving. Labs reviewed- Renal function/electrolytes with Estimated Creatinine Clearance: 51.5 mL/min (based on SCr of 1 mg/dL). according to latest data. Monitor urine output and serial BMP and adjust therapy as needed. Avoid nephrotoxins and renally dose meds for GFR listed above.   IV fluid hydration  Urinalysis negative for infection.      Orthostatic hypotension  Noted--- there is a documented history of hypotension  -She is fluid responsive  -Continue IVF resuscitation      Therapeutic opioid-induced constipation (OIC)  -Continue senna  -Added lactulose  -Will need aggressive bowel regimen while on oral narcotics  -Enema ordered, however she declined.      Asthma  Continue chronic controller  PRN nebs  Supplemental oxygen as needed      Left lower quadrant abdominal pain  Pt reports ongoing LLQ pain x 2 weeks accompanied by intermittent N/V.   CT/ AP with copious stool  Bowel regimen ordered      Hypokalemia  Improving with repletion      Anemia  Patient's anemia is currently stable. S/p 0 units of PRBCs.  Current CBC reviewed-   Lab Results   Component Value Date    HGB 9.6 (L) 08/24/2022    HCT 28.3 (L) 08/24/2022    MCV 84 08/24/2022     08/24/2022     Monitor serial CBC and transfuse if patient becomes hemodynamically unstable, symptomatic or H/H drops below 7/21.           Epilepsy  Chronic; continue chronic antiepileptics.  Maintain fall and seizure precautions.        VTE Risk Mitigation (From admission, onward)         Ordered     IP VTE LOW RISK PATIENT  Once         08/24/22 2113     Place sequential compression device  Until discontinued         08/24/22 2113                Discharge Planning   AKI: 8/25/2022     Code Status:  Full Code   Is the patient medically ready for discharge?:     Reason for patient still in hospital (select all that apply): Patient new problem and Treatment  Discharge Plan A: Home                  Eve Irwin NP  Department of Hospital Medicine   [facility]

## 2022-08-26 NOTE — ASSESSMENT & PLAN NOTE
Patient's anemia is currently stable. S/p 0 units of PRBCs.  Current CBC reviewed-   Lab Results   Component Value Date    HGB 9.6 (L) 08/24/2022    HCT 28.3 (L) 08/24/2022    MCV 84 08/24/2022     08/24/2022     Monitor serial CBC and transfuse if patient becomes hemodynamically unstable, symptomatic or H/H drops below 7/21.

## 2022-08-26 NOTE — ASSESSMENT & PLAN NOTE
Pt reports ongoing LLQ pain x 2 weeks accompanied by intermittent N/V.   CT/ AP with copious stool  Bowel regimen ordered

## 2022-08-26 NOTE — HOSPITAL COURSE
Ayana Anthony is a 52 year old female with a past medical history of ORQUIDEA, HTN, seizure disorder, HTN, asthma, hypothyroidism, MDD/LUCY, gastric bypass, and chronic pain who presented with difficulty urinating, hypotension, weakness, ALEX and hypokalemia secondary to dehydration. She was placed on IV fluids which improved her kidney function and blood pressure. Compression stockings were also ordered. Her potassium was aggressively repleted. Her course was complicated by nausea and vomiting possibly brought on by constipation seen on CT scan. Her constipation did resolve with senna, docusate and lactulose. She is currently tolerating PO and is using IV antiemetics as needed; with some intermittent nausea. She was discovered to have ORQUIDEA for which IV iron has been started. She will need to have a colonoscopy outpatient; a referral was placed on discharge. She has shown no evidence of gross bleed during her course. She was also referred to General Surgery on discharge as she has had a history of gastric bypass roughly twenty years ago and has had persistent nausea and vomiting. She was able to be discharged 8/30/2022.

## 2022-08-26 NOTE — SUBJECTIVE & OBJECTIVE
Interval History: Patient seen and examined.  She reports ongoing nausea despite zofran.  CT reviewed with patient- copious amounts of stool concerning for opioid induced constipation.  Her ALEX is improving and K is improving.  Bowel regimen ordered.  IVF bolus given for orthostatic hypotension.  Plan was discharge this evening, but called late in shift that patient was actively vomiting and did not feel well enough to go home.  She is declining enema.    Review of Systems   Constitutional:  Positive for fatigue. Negative for fever.   Respiratory:  Negative for cough and shortness of breath.    Cardiovascular:  Negative for chest pain.   Gastrointestinal:  Positive for abdominal pain, constipation, nausea and vomiting.   Genitourinary:  Negative for decreased urine volume and dysuria.   Musculoskeletal:  Negative for arthralgias and myalgias.   Psychiatric/Behavioral:  Negative for confusion. The patient is not nervous/anxious.    Objective:     Vital Signs (Most Recent):  Temp: 98.1 °F (36.7 °C) (08/26/22 0411)  Pulse: 79 (08/26/22 0411)  Resp: 15 (08/26/22 0411)  BP: (!) 99/58 (08/26/22 0411)  SpO2: 95 % (08/26/22 0411)   Vital Signs (24h Range):  Temp:  [97.6 °F (36.4 °C)-98.4 °F (36.9 °C)] 98.1 °F (36.7 °C)  Pulse:  [] 79  Resp:  [15-20] 15  SpO2:  [94 %-98 %] 95 %  BP: ()/(51-58) 99/58     Weight: 58.1 kg (128 lb)  Body mass index is 23.41 kg/m².    Intake/Output Summary (Last 24 hours) at 8/26/2022 0610  Last data filed at 8/26/2022 0500  Gross per 24 hour   Intake 2235 ml   Output --   Net 2235 ml      Physical Exam  Vitals and nursing note reviewed.   Constitutional:       Appearance: Normal appearance.   HENT:      Head: Normocephalic and atraumatic.      Mouth/Throat:      Mouth: Mucous membranes are dry.   Eyes:      Extraocular Movements: Extraocular movements intact.      Pupils: Pupils are equal, round, and reactive to light.   Cardiovascular:      Rate and Rhythm: Normal rate and regular  rhythm.   Pulmonary:      Effort: Pulmonary effort is normal.      Breath sounds: Normal breath sounds.   Abdominal:      General: Abdomen is flat. Bowel sounds are normal.      Palpations: Abdomen is soft.      Tenderness: There is abdominal tenderness (no guarding).   Musculoskeletal:         General: Normal range of motion.   Skin:     General: Skin is warm and dry.      Capillary Refill: Capillary refill takes less than 2 seconds.   Neurological:      General: No focal deficit present.      Mental Status: She is alert.   Psychiatric:         Mood and Affect: Mood normal.       Significant Labs: All pertinent labs within the past 24 hours have been reviewed.    Significant Imaging: I have reviewed all pertinent imaging results/findings within the past 24 hours.

## 2022-08-26 NOTE — ASSESSMENT & PLAN NOTE
Patient with acute kidney injury likely due to IVVD/dehydration ALEX is currently improving. Labs reviewed- Renal function/electrolytes with Estimated Creatinine Clearance: 51.5 mL/min (based on SCr of 1 mg/dL). according to latest data. Monitor urine output and serial BMP and adjust therapy as needed. Avoid nephrotoxins and renally dose meds for GFR listed above.   IV fluid hydration  Urinalysis negative for infection.

## 2022-08-26 NOTE — ASSESSMENT & PLAN NOTE
-Continue senna  -Added lactulose  -Will need aggressive bowel regimen while on oral narcotics  -Enema ordered, however she declined.

## 2022-08-26 NOTE — SUBJECTIVE & OBJECTIVE
Interval History:  Pt seen and examined.  Continues with low BP readings dropping into the low 80s with standing.  She continues with nausea and vomited this morning.  She is now having bowel movements.  She is afebrile.  Will need aggressive fluid resuscitation      Review of Systems   Constitutional:  Positive for fatigue. Negative for chills and fever.   HENT:  Negative for trouble swallowing.    Respiratory:  Negative for cough and shortness of breath.    Gastrointestinal:  Positive for nausea and vomiting.   Genitourinary:  Negative for difficulty urinating and dysuria.   Neurological:  Positive for dizziness and light-headedness.   Objective:     Vital Signs (Most Recent):  Temp: 98.4 °F (36.9 °C) (08/26/22 0718)  Pulse: 104 (08/26/22 0908)  Resp: 16 (08/26/22 0850)  BP: (!) 80/51 (08/26/22 0908)  SpO2: 96 % (08/26/22 0718)   Vital Signs (24h Range):  Temp:  [97.6 °F (36.4 °C)-98.4 °F (36.9 °C)] 98.4 °F (36.9 °C)  Pulse:  [] 104  Resp:  [15-20] 16  SpO2:  [94 %-98 %] 96 %  BP: ()/(51-58) 80/51     Weight: 58.1 kg (128 lb)  Body mass index is 23.41 kg/m².    Intake/Output Summary (Last 24 hours) at 8/26/2022 1005  Last data filed at 8/26/2022 0500  Gross per 24 hour   Intake 1995 ml   Output --   Net 1995 ml      Physical Exam  Constitutional:       Appearance: Normal appearance.   HENT:      Head: Normocephalic.   Eyes:      Extraocular Movements: Extraocular movements intact.      Conjunctiva/sclera: Conjunctivae normal.      Pupils: Pupils are equal, round, and reactive to light.   Cardiovascular:      Rate and Rhythm: Normal rate and regular rhythm.      Pulses: Normal pulses.      Heart sounds: Normal heart sounds.   Pulmonary:      Effort: Pulmonary effort is normal.      Breath sounds: Normal breath sounds.   Abdominal:      General: Abdomen is flat. Bowel sounds are normal.      Palpations: Abdomen is soft.   Musculoskeletal:      Cervical back: Normal range of motion.   Skin:     General:  Skin is warm and dry.   Neurological:      General: No focal deficit present.      Mental Status: She is alert and oriented to person, place, and time. Mental status is at baseline.       Significant Labs: All pertinent labs within the past 24 hours have been reviewed.  CBC:   Recent Labs   Lab 08/24/22  1014   WBC 10.71   HGB 9.6*   HCT 28.3*        CMP:   Recent Labs   Lab 08/24/22  1014 08/24/22  1727 08/25/22  0402 08/25/22  1235 08/26/22  0450   *   < > 136 137 140   K 2.6*   < > 3.1* 3.9 3.8   CL 99   < > 105 107 113*   CO2 19*   < > 20* 19* 19*   *   < > 79 91 76   BUN 50*   < > 40* 33* 23*   CREATININE 1.8*   < > 1.3 1.3 1.0   CALCIUM 9.4   < > 8.5* 8.5* 8.3*   PROT 7.8  --   --   --   --    ALBUMIN 3.9  --   --   --   --    BILITOT 0.3  --   --   --   --    ALKPHOS 89  --   --   --   --    AST 16  --   --   --   --    ALT 21  --   --   --   --    ANIONGAP 17*   < > 11 11 8    < > = values in this interval not displayed.       Significant Imaging: I have reviewed all pertinent imaging results/findings within the past 24 hours.

## 2022-08-26 NOTE — CARE UPDATE
08/25/22 1943   Patient Assessment/Suction   Level of Consciousness (AVPU) alert   Respiratory Effort Normal;Unlabored   Expansion/Accessory Muscles/Retractions no use of accessory muscles   All Lung Fields Breath Sounds clear   Rhythm/Pattern, Respiratory no shortness of breath reported   Cough Frequency no cough   PRE-TX-O2   O2 Device (Oxygen Therapy) room air   SpO2 97 %   Pulse Oximetry Type Intermittent   Aerosol Therapy   $ Aerosol Therapy Charges PRN treatment not required

## 2022-08-26 NOTE — PLAN OF CARE
POC discussed with patient, verbalized understanding. Patient with uneventful night, slept off and on between care. VS stable. Medicated X 1 for complaints of chronic back pain. Medicated X 1 during the night for complaints of nausea without emesis. Up to bathroom multiple times to void, no bm. Took prn Lactulose, but refused enema at this time. IVF infusing. NSR. Call light at bedside, bed alarm in use.

## 2022-08-26 NOTE — CARE UPDATE
08/26/22 0650   Patient Assessment/Suction   Level of Consciousness (AVPU) alert   All Lung Fields Breath Sounds clear   PRE-TX-O2   O2 Device (Oxygen Therapy) room air   SpO2 98 %   Pulse Oximetry Type Intermittent   $ Pulse Oximetry - Multiple Charge Pulse Oximetry - Multiple   Pulse 72   Resp 18   Aerosol Therapy   $ Aerosol Therapy Charges PRN treatment not required   Inhaler   $ Inhaler Charges MDI (Metered Dose Inahler) Treatment   Respiratory Treatment Status (Inhaler) given   Treatment Route (Inhaler) mouthpiece   Patient Position (Inhaler) HOB elevated   Signs of Intolerance (Inhaler) none   Breath Sounds Post-Respiratory Treatment   Throughout All Fields Post-Treatment All Fields   Throughout All Fields Post-Treatment no change   Post-treatment Heart Rate (beats/min) 88   Post-treatment Resp Rate (breaths/min) 18

## 2022-08-26 NOTE — ASSESSMENT & PLAN NOTE
Persistent with drops in BP to 80s despite ongoing fluid resuscitation.  -Place compression stockings  -1L NS bolus  -Repeat orthostatics

## 2022-08-26 NOTE — ASSESSMENT & PLAN NOTE
-Continue senna  -Added lactulose  -Will need aggressive bowel regimen while on oral narcotics  -She is having BMs today.

## 2022-08-26 NOTE — ASSESSMENT & PLAN NOTE
Noted--- there is a documented history of hypotension  -She is fluid responsive  -Continue IVF resuscitation

## 2022-08-27 PROBLEM — R31.9 HEMATURIA: Status: RESOLVED | Noted: 2018-03-14 | Resolved: 2022-08-27

## 2022-08-27 PROBLEM — R27.0 ATAXIA: Status: ACTIVE | Noted: 2022-03-15

## 2022-08-27 PROBLEM — E87.6 HYPOKALEMIA: Status: RESOLVED | Noted: 2018-03-14 | Resolved: 2022-08-27

## 2022-08-27 PROBLEM — J45.51 SEVERE PERSISTENT ASTHMA WITH ACUTE EXACERBATION: Status: RESOLVED | Noted: 2017-11-03 | Resolved: 2022-08-27

## 2022-08-27 PROBLEM — E16.2 HYPOGLYCEMIA: Status: RESOLVED | Noted: 2017-12-08 | Resolved: 2022-08-27

## 2022-08-27 PROBLEM — F11.10 OPIOID ABUSE: Status: ACTIVE | Noted: 2022-08-27

## 2022-08-27 PROBLEM — L60.0 INGROWN RIGHT BIG TOENAIL: Status: RESOLVED | Noted: 2018-05-14 | Resolved: 2022-08-27

## 2022-08-27 PROBLEM — K57.32 DIVERTICULITIS OF LARGE INTESTINE WITHOUT PERFORATION OR ABSCESS WITHOUT BLEEDING: Status: RESOLVED | Noted: 2017-06-26 | Resolved: 2022-08-27

## 2022-08-27 PROBLEM — K59.03 THERAPEUTIC OPIOID-INDUCED CONSTIPATION (OIC): Status: RESOLVED | Noted: 2022-08-26 | Resolved: 2022-08-27

## 2022-08-27 PROBLEM — D75.839 THROMBOCYTOSIS: Status: RESOLVED | Noted: 2017-09-18 | Resolved: 2022-08-27

## 2022-08-27 PROBLEM — T40.2X5A THERAPEUTIC OPIOID-INDUCED CONSTIPATION (OIC): Status: RESOLVED | Noted: 2022-08-26 | Resolved: 2022-08-27

## 2022-08-27 PROBLEM — F11.10 OPIOID ABUSE: Status: RESOLVED | Noted: 2022-08-27 | Resolved: 2022-08-27

## 2022-08-27 PROBLEM — E03.9 HYPOTHYROIDISM: Status: ACTIVE | Noted: 2017-12-08

## 2022-08-27 LAB
ANION GAP SERPL CALC-SCNC: 6 MMOL/L (ref 8–16)
BASOPHILS # BLD AUTO: 0.07 K/UL (ref 0–0.2)
BASOPHILS NFR BLD: 1 % (ref 0–1.9)
BUN SERPL-MCNC: 18 MG/DL (ref 6–20)
CALCIUM SERPL-MCNC: 7.6 MG/DL (ref 8.7–10.5)
CHLORIDE SERPL-SCNC: 115 MMOL/L (ref 95–110)
CHLORIDE UR-SCNC: 165 MMOL/L (ref 25–200)
CO2 SERPL-SCNC: 17 MMOL/L (ref 23–29)
CREAT SERPL-MCNC: 0.9 MG/DL (ref 0.5–1.4)
DIFFERENTIAL METHOD: ABNORMAL
EOSINOPHIL # BLD AUTO: 0.5 K/UL (ref 0–0.5)
EOSINOPHIL NFR BLD: 7.9 % (ref 0–8)
ERYTHROCYTE [DISTWIDTH] IN BLOOD BY AUTOMATED COUNT: 17.4 % (ref 11.5–14.5)
EST. GFR  (NO RACE VARIABLE): >60 ML/MIN/1.73 M^2
GLUCOSE SERPL-MCNC: 79 MG/DL (ref 70–110)
HCT VFR BLD AUTO: 26.4 % (ref 37–48.5)
HGB BLD-MCNC: 8.2 G/DL (ref 12–16)
IMM GRANULOCYTES # BLD AUTO: 0.08 K/UL (ref 0–0.04)
IMM GRANULOCYTES NFR BLD AUTO: 1.2 % (ref 0–0.5)
LYMPHOCYTES # BLD AUTO: 2.2 K/UL (ref 1–4.8)
LYMPHOCYTES NFR BLD: 31.5 % (ref 18–48)
MAGNESIUM SERPL-MCNC: 1.8 MG/DL (ref 1.6–2.6)
MCH RBC QN AUTO: 27.9 PG (ref 27–31)
MCHC RBC AUTO-ENTMCNC: 31.1 G/DL (ref 32–36)
MCV RBC AUTO: 90 FL (ref 82–98)
MONOCYTES # BLD AUTO: 1.1 K/UL (ref 0.3–1)
MONOCYTES NFR BLD: 16.5 % (ref 4–15)
NEUTROPHILS # BLD AUTO: 2.9 K/UL (ref 1.8–7.7)
NEUTROPHILS NFR BLD: 41.9 % (ref 38–73)
NRBC BLD-RTO: 0 /100 WBC
PLATELET # BLD AUTO: 351 K/UL (ref 150–450)
PMV BLD AUTO: 10.3 FL (ref 9.2–12.9)
POTASSIUM SERPL-SCNC: 4.6 MMOL/L (ref 3.5–5.1)
POTASSIUM UR-SCNC: 34 MMOL/L (ref 15–95)
RBC # BLD AUTO: 2.94 M/UL (ref 4–5.4)
SODIUM SERPL-SCNC: 138 MMOL/L (ref 136–145)
SODIUM UR-SCNC: 129 MMOL/L (ref 20–250)
WBC # BLD AUTO: 6.85 K/UL (ref 3.9–12.7)

## 2022-08-27 PROCEDURE — 25000003 PHARM REV CODE 250: Performed by: STUDENT IN AN ORGANIZED HEALTH CARE EDUCATION/TRAINING PROGRAM

## 2022-08-27 PROCEDURE — 63600175 PHARM REV CODE 636 W HCPCS: Performed by: NURSE PRACTITIONER

## 2022-08-27 PROCEDURE — 25000003 PHARM REV CODE 250: Performed by: NURSE PRACTITIONER

## 2022-08-27 PROCEDURE — 12000002 HC ACUTE/MED SURGE SEMI-PRIVATE ROOM

## 2022-08-27 PROCEDURE — 94761 N-INVAS EAR/PLS OXIMETRY MLT: CPT

## 2022-08-27 PROCEDURE — 36415 COLL VENOUS BLD VENIPUNCTURE: CPT | Performed by: NURSE PRACTITIONER

## 2022-08-27 PROCEDURE — 25000003 PHARM REV CODE 250

## 2022-08-27 PROCEDURE — 80048 BASIC METABOLIC PNL TOTAL CA: CPT | Performed by: NURSE PRACTITIONER

## 2022-08-27 PROCEDURE — 84300 ASSAY OF URINE SODIUM: CPT | Performed by: STUDENT IN AN ORGANIZED HEALTH CARE EDUCATION/TRAINING PROGRAM

## 2022-08-27 PROCEDURE — 94640 AIRWAY INHALATION TREATMENT: CPT

## 2022-08-27 PROCEDURE — 99900035 HC TECH TIME PER 15 MIN (STAT)

## 2022-08-27 PROCEDURE — 84133 ASSAY OF URINE POTASSIUM: CPT | Performed by: STUDENT IN AN ORGANIZED HEALTH CARE EDUCATION/TRAINING PROGRAM

## 2022-08-27 PROCEDURE — 85025 COMPLETE CBC W/AUTO DIFF WBC: CPT | Performed by: NURSE PRACTITIONER

## 2022-08-27 PROCEDURE — 83735 ASSAY OF MAGNESIUM: CPT | Performed by: NURSE PRACTITIONER

## 2022-08-27 PROCEDURE — 63600175 PHARM REV CODE 636 W HCPCS: Performed by: STUDENT IN AN ORGANIZED HEALTH CARE EDUCATION/TRAINING PROGRAM

## 2022-08-27 PROCEDURE — 82436 ASSAY OF URINE CHLORIDE: CPT | Performed by: STUDENT IN AN ORGANIZED HEALTH CARE EDUCATION/TRAINING PROGRAM

## 2022-08-27 RX ORDER — ACETAMINOPHEN 500 MG
5000 TABLET ORAL DAILY
Status: DISCONTINUED | OUTPATIENT
Start: 2022-08-27 | End: 2022-08-30 | Stop reason: HOSPADM

## 2022-08-27 RX ORDER — SODIUM CHLORIDE, SODIUM LACTATE, POTASSIUM CHLORIDE, CALCIUM CHLORIDE 600; 310; 30; 20 MG/100ML; MG/100ML; MG/100ML; MG/100ML
INJECTION, SOLUTION INTRAVENOUS CONTINUOUS
Status: DISCONTINUED | OUTPATIENT
Start: 2022-08-27 | End: 2022-08-30 | Stop reason: HOSPADM

## 2022-08-27 RX ORDER — DIPHENHYDRAMINE HCL 25 MG
25 CAPSULE ORAL EVERY 6 HOURS PRN
Status: DISCONTINUED | OUTPATIENT
Start: 2022-08-27 | End: 2022-08-30 | Stop reason: HOSPADM

## 2022-08-27 RX ORDER — FOLIC ACID 1 MG/1
1 TABLET ORAL DAILY
Status: DISCONTINUED | OUTPATIENT
Start: 2022-08-27 | End: 2022-08-30 | Stop reason: HOSPADM

## 2022-08-27 RX ORDER — SERTRALINE HYDROCHLORIDE 50 MG/1
200 TABLET, FILM COATED ORAL DAILY
Status: DISCONTINUED | OUTPATIENT
Start: 2022-08-27 | End: 2022-08-30 | Stop reason: HOSPADM

## 2022-08-27 RX ORDER — ONDANSETRON 2 MG/ML
8 INJECTION INTRAMUSCULAR; INTRAVENOUS EVERY 6 HOURS PRN
Status: DISCONTINUED | OUTPATIENT
Start: 2022-08-27 | End: 2022-08-30 | Stop reason: HOSPADM

## 2022-08-27 RX ORDER — DIVALPROEX SODIUM 250 MG/1
500 TABLET, FILM COATED, EXTENDED RELEASE ORAL NIGHTLY
Status: DISCONTINUED | OUTPATIENT
Start: 2022-08-27 | End: 2022-08-30 | Stop reason: HOSPADM

## 2022-08-27 RX ORDER — LANOLIN ALCOHOL/MO/W.PET/CERES
50 CREAM (GRAM) TOPICAL DAILY
Status: DISCONTINUED | OUTPATIENT
Start: 2022-08-27 | End: 2022-08-30 | Stop reason: HOSPADM

## 2022-08-27 RX ADMIN — PROMETHAZINE HYDROCHLORIDE 12.5 MG: 25 INJECTION INTRAMUSCULAR; INTRAVENOUS at 08:08

## 2022-08-27 RX ADMIN — DULOXETINE 60 MG: 30 CAPSULE, DELAYED RELEASE ORAL at 08:08

## 2022-08-27 RX ADMIN — LEVOTHYROXINE SODIUM 125 MCG: 0.03 TABLET ORAL at 05:08

## 2022-08-27 RX ADMIN — SERTRALINE HYDROCHLORIDE 200 MG: 50 TABLET ORAL at 11:08

## 2022-08-27 RX ADMIN — DIPHENHYDRAMINE HYDROCHLORIDE 25 MG: 25 CAPSULE ORAL at 08:08

## 2022-08-27 RX ADMIN — OXCARBAZEPINE 900 MG: 150 TABLET, FILM COATED ORAL at 08:08

## 2022-08-27 RX ADMIN — SODIUM CHLORIDE, POTASSIUM CHLORIDE, SODIUM LACTATE AND CALCIUM CHLORIDE: 600; 310; 30; 20 INJECTION, SOLUTION INTRAVENOUS at 11:08

## 2022-08-27 RX ADMIN — ONDANSETRON 8 MG: 2 INJECTION INTRAMUSCULAR; INTRAVENOUS at 06:08

## 2022-08-27 RX ADMIN — MIRTAZAPINE 15 MG: 15 TABLET, FILM COATED ORAL at 08:08

## 2022-08-27 RX ADMIN — ACETAMINOPHEN 650 MG: 325 TABLET ORAL at 06:08

## 2022-08-27 RX ADMIN — FOLIC ACID 1 MG: 1 TABLET ORAL at 11:08

## 2022-08-27 RX ADMIN — OXYCODONE HYDROCHLORIDE AND ACETAMINOPHEN 500 MG: 500 TABLET ORAL at 08:08

## 2022-08-27 RX ADMIN — ONDANSETRON 8 MG: 2 INJECTION INTRAMUSCULAR; INTRAVENOUS at 05:08

## 2022-08-27 RX ADMIN — OXYCODONE HYDROCHLORIDE 30 MG: 10 TABLET ORAL at 06:08

## 2022-08-27 RX ADMIN — DIVALPROEX SODIUM 500 MG: 250 TABLET, EXTENDED RELEASE ORAL at 08:08

## 2022-08-27 RX ADMIN — CHOLECALCIFEROL TAB 125 MCG (5000 UNIT) 5000 UNITS: 125 TAB at 11:08

## 2022-08-27 RX ADMIN — FLUTICASONE FUROATE AND VILANTEROL TRIFENATATE 1 PUFF: 100; 25 POWDER RESPIRATORY (INHALATION) at 09:08

## 2022-08-27 RX ADMIN — ONDANSETRON 8 MG: 2 INJECTION INTRAMUSCULAR; INTRAVENOUS at 11:08

## 2022-08-27 RX ADMIN — PROMETHAZINE HYDROCHLORIDE 12.5 MG: 25 INJECTION INTRAMUSCULAR; INTRAVENOUS at 12:08

## 2022-08-27 RX ADMIN — PYRIDOXINE HCL TAB 50 MG 50 MG: 50 TAB at 11:08

## 2022-08-27 RX ADMIN — OXYCODONE HYDROCHLORIDE 30 MG: 10 TABLET ORAL at 03:08

## 2022-08-27 RX ADMIN — SODIUM CHLORIDE AND POTASSIUM CHLORIDE: .9; .15 SOLUTION INTRAVENOUS at 05:08

## 2022-08-27 NOTE — PROGRESS NOTES
[facility] Hospital Medicine  Progress Note    Patient Name: Ayana Anthony  MRN: [id number]  Patient Class: IP- Inpatient   Admission Date: 8/24/2022  Length of Stay: 1 days  Attending Physician: Sunil Edmondson MD  Primary Care Provider: Rey Oconnell NP        Subjective:     Principal Problem:Orthostatic hypotension        HPI:  Ayana Anthony is a 52-year-old female with past medical history significant for anemia, chronic kidney disease, hyperkalemia, hypertension, epilepsy, and chronic pain who presented to the emergency department at the direction of her pulmonologist.  She had an abnormal potassium of 2.6 this morning at her pulmonology appointment.  She reports LLQ abdominal pain, left flank pain, decreased urine output and difficulty voiding which has been ongoing for about 2 weeks.  No hematuria or dysuria.  Denies recent fevers or chest pain.  She feels generally weak and is dyspneic on exertion.  Surgical history includes gastric bypass, hysterectomy, cholecystectomy, and abdominoplasty.  Workup in the emergency department is significant for potassium 2.9 and creatinine 1.8.  She will be placed on observation the service of hospital Medicine for continued monitoring and treatment.      Overview/Hospital Course:  Ayana Anthony is a 52 year old female with a past medical history of ORQUIDEA, HTN, seizure disorder, HTN, asthma, hypothyroidism, MDD/LUCY, gastric bypass, and chronic pain who presented with difficulty urinating, hypotension, weakness, ALEX and hypokalemia secondary to dehydration. She is on IV fluids which improved her kidney function and blood pressure. Compression stockings were also ordered. Her potassium was aggressively repleted. Her course was complicated by nausea and vomiting possibly brought on by constipation seen on CT scan. Her constipation did resolve with senna, docusate and lactulose. She is currently tolerating PO and is using IV antiemetics as needed. She  "was discovered to have ORQUIDEA for which IV iron has been started. She has shown no evidence of gross bleed during her course.      Interval History: see "Hospital Course"    Review of Systems   Constitutional:  Positive for fatigue. Negative for chills and fever.   HENT:  Negative for trouble swallowing.    Respiratory:  Negative for cough and shortness of breath.    Gastrointestinal:  Positive for nausea and vomiting.   Genitourinary:  Negative for difficulty urinating and dysuria.   Neurological:  Positive for dizziness and light-headedness.   All other systems reviewed and are negative.  Objective:     Vital Signs (Most Recent):  Temp: 98.8 °F (37.1 °C) (08/27/22 0716)  Pulse: 88 (08/27/22 0907)  Resp: 14 (08/27/22 0907)  BP: 126/61 (08/27/22 0716)  SpO2: 95 % (08/27/22 0907) Vital Signs (24h Range):  Temp:  [97.8 °F (36.6 °C)-98.8 °F (37.1 °C)] 98.8 °F (37.1 °C)  Pulse:  [] 88  Resp:  [14-18] 14  SpO2:  [93 %-99 %] 95 %  BP: ()/(50-61) 126/61     Weight: 58.1 kg (128 lb)  Body mass index is 23.41 kg/m².    Intake/Output Summary (Last 24 hours) at 8/27/2022 0958  Last data filed at 8/27/2022 0634  Gross per 24 hour   Intake 5103.97 ml   Output --   Net 5103.97 ml      Physical Exam  Constitutional:       Appearance: Normal appearance.   HENT:      Head: Normocephalic and atraumatic.      Right Ear: External ear normal.      Left Ear: External ear normal.      Nose: Nose normal.      Mouth/Throat:      Mouth: Mucous membranes are moist.      Pharynx: Oropharynx is clear.   Eyes:      Extraocular Movements: Extraocular movements intact.      Conjunctiva/sclera: Conjunctivae normal.   Cardiovascular:      Rate and Rhythm: Normal rate and regular rhythm.      Pulses: Normal pulses.      Heart sounds: Normal heart sounds.   Pulmonary:      Effort: Pulmonary effort is normal.      Breath sounds: Normal breath sounds.   Abdominal:      General: Abdomen is flat. Bowel sounds are normal.      Palpations: Abdomen " is soft.   Musculoskeletal:         General: Normal range of motion.      Cervical back: Normal range of motion.   Skin:     General: Skin is warm and dry.   Neurological:      General: No focal deficit present.      Mental Status: She is alert and oriented to person, place, and time. Mental status is at baseline.   Psychiatric:         Mood and Affect: Mood normal.         Behavior: Behavior normal.       Significant Labs: All pertinent labs within the past 24 hours have been reviewed.    Significant Imaging: I have reviewed all pertinent imaging results/findings within the past 24 hours.      Assessment/Plan:      * Orthostatic hypotension  Likely secondary to vomiting induced dehydration.  -PRN antiemetics  -Compression  -IV fluids  -Encourage PO intake  -Check orthostatics daily      Asthma  -Continue home inhalers  -Duonebs PRN      Hypothyroidism  -Continue Synthroid      Vitamin B6 deficiency  -Continue repletion      Chronic pain  -Continue duloxetine  -Continue home opiate regimen      Vitamin D deficiency  -Continue repletion      Iron deficiency anemia  -IV iron repletion  -Patient will need colonoscopy if not done so      HTN (hypertension)  -Hold home amlodipine given normal/low BPs      Epilepsy  -Continue home antiepileptics  -Fall, aspiration and seizure precautions      Insomnia  -Remeron QHS      Depression  -Continue sertraline          VTE Risk Mitigation (From admission, onward)         Ordered     Place CRISTINE hose  Until discontinued         08/26/22 1011     IP VTE LOW RISK PATIENT  Once         08/24/22 2113     Place sequential compression device  Until discontinued         08/24/22 2113                Discharge Planning   AKI: 8/27/2022     Code Status: Full Code   Is the patient medically ready for discharge?:     Reason for patient still in hospital (select all that apply): Patient trending condition, Laboratory test, Treatment and PT / OT recommendations  Discharge Plan A: Home                   Sunil Edmondson MD  Department of Hospital Medicine   [facility]

## 2022-08-27 NOTE — PLAN OF CARE
POC reviewed with pt, pt verbalizes understanding. AAOx4. Ambulatory standby assist. AVASYS at bedside. Seizure precautions maintained. IVF infusing. Moderate relief from prn pain and nausea medicine. NSR-tachy on tele. Q2h rounding done. Bed locked and low, call light in reach.

## 2022-08-27 NOTE — PT/OT/SLP PROGRESS
Occupational Therapy      Patient Name:  Ayana Anthony   MRN:  [id number]    Patient not seen today secondary to Nausea/vomiting, Patient unwilling to participate. Will follow-up.    8/27/2022

## 2022-08-27 NOTE — NURSING
Orthostatic vitals   Lying: BP- 115/61, HR- 109  Sitting: BP- 115/63, HR- 107  Standing: BP- 124/64, HR-107    Pt c/o dizziness while standing but otherwise tolerated well.

## 2022-08-27 NOTE — CARE UPDATE
08/26/22 2015   Patient Assessment/Suction   Level of Consciousness (AVPU) alert   Respiratory Effort Unlabored;Normal   Expansion/Accessory Muscles/Retractions no use of accessory muscles;no retractions;expansion symmetric   All Lung Fields Breath Sounds Anterior:;Lateral:;clear   Rhythm/Pattern, Respiratory no shortness of breath reported;unlabored;depth regular;pattern regular   PRE-TX-O2   O2 Device (Oxygen Therapy) room air   SpO2 99 %   Pulse Oximetry Type Intermittent   $ Pulse Oximetry - Multiple Charge Pulse Oximetry - Multiple   Pulse 93   Resp 18   Aerosol Therapy   $ Aerosol Therapy Charges PRN treatment not required   Respiratory Treatment Status (SVN) PRN treatment not required

## 2022-08-27 NOTE — ASSESSMENT & PLAN NOTE
Patient's anemia is currently stable. S/p 0 units of PRBCs.  Current CBC reviewed-   Lab Results   Component Value Date    HGB 8.2 (L) 08/27/2022    HCT 26.4 (L) 08/27/2022    MCV 90 08/27/2022     08/27/2022     Monitor serial CBC and transfuse if patient becomes hemodynamically unstable, symptomatic or H/H drops below 7/21.

## 2022-08-27 NOTE — PT/OT/SLP PROGRESS
Physical Therapy      Patient Name:  Ayana Anthony   MRN:  [id number]    PT eval attempted- vomitting and recently medicated per nurse Christiansen. Will re attempt.

## 2022-08-27 NOTE — SUBJECTIVE & OBJECTIVE
"Interval History: see "Hospital Course"    Review of Systems   Constitutional:  Positive for fatigue. Negative for chills and fever.   HENT:  Negative for trouble swallowing.    Respiratory:  Negative for cough and shortness of breath.    Gastrointestinal:  Positive for nausea and vomiting.   Genitourinary:  Negative for difficulty urinating and dysuria.   Neurological:  Positive for dizziness and light-headedness.   All other systems reviewed and are negative.  Objective:     Vital Signs (Most Recent):  Temp: 98.8 °F (37.1 °C) (08/27/22 0716)  Pulse: 88 (08/27/22 0907)  Resp: 14 (08/27/22 0907)  BP: 126/61 (08/27/22 0716)  SpO2: 95 % (08/27/22 0907) Vital Signs (24h Range):  Temp:  [97.8 °F (36.6 °C)-98.8 °F (37.1 °C)] 98.8 °F (37.1 °C)  Pulse:  [] 88  Resp:  [14-18] 14  SpO2:  [93 %-99 %] 95 %  BP: ()/(50-61) 126/61     Weight: 58.1 kg (128 lb)  Body mass index is 23.41 kg/m².    Intake/Output Summary (Last 24 hours) at 8/27/2022 0958  Last data filed at 8/27/2022 0634  Gross per 24 hour   Intake 5103.97 ml   Output --   Net 5103.97 ml      Physical Exam  Constitutional:       Appearance: Normal appearance.   HENT:      Head: Normocephalic and atraumatic.      Right Ear: External ear normal.      Left Ear: External ear normal.      Nose: Nose normal.      Mouth/Throat:      Mouth: Mucous membranes are moist.      Pharynx: Oropharynx is clear.   Eyes:      Extraocular Movements: Extraocular movements intact.      Conjunctiva/sclera: Conjunctivae normal.   Cardiovascular:      Rate and Rhythm: Normal rate and regular rhythm.      Pulses: Normal pulses.      Heart sounds: Normal heart sounds.   Pulmonary:      Effort: Pulmonary effort is normal.      Breath sounds: Normal breath sounds.   Abdominal:      General: Abdomen is flat. Bowel sounds are normal.      Palpations: Abdomen is soft.   Musculoskeletal:         General: Normal range of motion.      Cervical back: Normal range of motion.   Skin:     " General: Skin is warm and dry.   Neurological:      General: No focal deficit present.      Mental Status: She is alert and oriented to person, place, and time. Mental status is at baseline.   Psychiatric:         Mood and Affect: Mood normal.         Behavior: Behavior normal.       Significant Labs: All pertinent labs within the past 24 hours have been reviewed.    Significant Imaging: I have reviewed all pertinent imaging results/findings within the past 24 hours.

## 2022-08-27 NOTE — ASSESSMENT & PLAN NOTE
Likely secondary to vomiting induced dehydration.  -PRN antiemetics  -Compression  -IV fluids  -Encourage PO intake  -Check orthostatics daily

## 2022-08-27 NOTE — PLAN OF CARE
Plan of care reviewed with patient. Patient verbalized complete understanding. Pt awake, alert, and oriented. Pt not complaining of pain. Pt complaining of nausea, meds given. IV CDI.  All fall precautions maintained, bed in lowest position, locked, call light within reach. Side rails up times 2. Slip resistant socks maintained.

## 2022-08-28 PROBLEM — E86.0 DEHYDRATION: Status: ACTIVE | Noted: 2022-08-26

## 2022-08-28 LAB
ALBUMIN SERPL BCP-MCNC: 2.8 G/DL (ref 3.5–5.2)
ALP SERPL-CCNC: 79 U/L (ref 55–135)
ALT SERPL W/O P-5'-P-CCNC: 34 U/L (ref 10–44)
ANION GAP SERPL CALC-SCNC: 7 MMOL/L (ref 8–16)
AST SERPL-CCNC: 60 U/L (ref 10–40)
BASOPHILS # BLD AUTO: 0.08 K/UL (ref 0–0.2)
BASOPHILS NFR BLD: 1.1 % (ref 0–1.9)
BILIRUB SERPL-MCNC: 0.1 MG/DL (ref 0.1–1)
BUN SERPL-MCNC: 13 MG/DL (ref 6–20)
CALCIUM SERPL-MCNC: 8.1 MG/DL (ref 8.7–10.5)
CHLORIDE SERPL-SCNC: 113 MMOL/L (ref 95–110)
CO2 SERPL-SCNC: 17 MMOL/L (ref 23–29)
CREAT SERPL-MCNC: 0.8 MG/DL (ref 0.5–1.4)
DIFFERENTIAL METHOD: ABNORMAL
EOSINOPHIL # BLD AUTO: 0.5 K/UL (ref 0–0.5)
EOSINOPHIL NFR BLD: 6.8 % (ref 0–8)
ERYTHROCYTE [DISTWIDTH] IN BLOOD BY AUTOMATED COUNT: 17.2 % (ref 11.5–14.5)
EST. GFR  (NO RACE VARIABLE): >60 ML/MIN/1.73 M^2
GLUCOSE SERPL-MCNC: 78 MG/DL (ref 70–110)
HCT VFR BLD AUTO: 26.6 % (ref 37–48.5)
HGB BLD-MCNC: 8.1 G/DL (ref 12–16)
IMM GRANULOCYTES # BLD AUTO: 0.06 K/UL (ref 0–0.04)
IMM GRANULOCYTES NFR BLD AUTO: 0.8 % (ref 0–0.5)
LYMPHOCYTES # BLD AUTO: 2.1 K/UL (ref 1–4.8)
LYMPHOCYTES NFR BLD: 27.3 % (ref 18–48)
MAGNESIUM SERPL-MCNC: 1.6 MG/DL (ref 1.6–2.6)
MCH RBC QN AUTO: 27.3 PG (ref 27–31)
MCHC RBC AUTO-ENTMCNC: 30.5 G/DL (ref 32–36)
MCV RBC AUTO: 90 FL (ref 82–98)
MONOCYTES # BLD AUTO: 1 K/UL (ref 0.3–1)
MONOCYTES NFR BLD: 13.1 % (ref 4–15)
NEUTROPHILS # BLD AUTO: 3.9 K/UL (ref 1.8–7.7)
NEUTROPHILS NFR BLD: 50.9 % (ref 38–73)
NRBC BLD-RTO: 0 /100 WBC
PHOSPHATE SERPL-MCNC: 3.3 MG/DL (ref 2.7–4.5)
PLATELET # BLD AUTO: 396 K/UL (ref 150–450)
PMV BLD AUTO: 10.3 FL (ref 9.2–12.9)
POTASSIUM SERPL-SCNC: 4.5 MMOL/L (ref 3.5–5.1)
PROT SERPL-MCNC: 5.7 G/DL (ref 6–8.4)
RBC # BLD AUTO: 2.97 M/UL (ref 4–5.4)
SODIUM SERPL-SCNC: 137 MMOL/L (ref 136–145)
WBC # BLD AUTO: 7.55 K/UL (ref 3.9–12.7)

## 2022-08-28 PROCEDURE — 97116 GAIT TRAINING THERAPY: CPT

## 2022-08-28 PROCEDURE — 25000003 PHARM REV CODE 250: Performed by: STUDENT IN AN ORGANIZED HEALTH CARE EDUCATION/TRAINING PROGRAM

## 2022-08-28 PROCEDURE — 25000003 PHARM REV CODE 250: Performed by: NURSE PRACTITIONER

## 2022-08-28 PROCEDURE — 97161 PT EVAL LOW COMPLEX 20 MIN: CPT

## 2022-08-28 PROCEDURE — 36415 COLL VENOUS BLD VENIPUNCTURE: CPT | Performed by: STUDENT IN AN ORGANIZED HEALTH CARE EDUCATION/TRAINING PROGRAM

## 2022-08-28 PROCEDURE — 84100 ASSAY OF PHOSPHORUS: CPT | Performed by: STUDENT IN AN ORGANIZED HEALTH CARE EDUCATION/TRAINING PROGRAM

## 2022-08-28 PROCEDURE — 63600175 PHARM REV CODE 636 W HCPCS: Performed by: STUDENT IN AN ORGANIZED HEALTH CARE EDUCATION/TRAINING PROGRAM

## 2022-08-28 PROCEDURE — 83735 ASSAY OF MAGNESIUM: CPT | Performed by: STUDENT IN AN ORGANIZED HEALTH CARE EDUCATION/TRAINING PROGRAM

## 2022-08-28 PROCEDURE — 94761 N-INVAS EAR/PLS OXIMETRY MLT: CPT

## 2022-08-28 PROCEDURE — 94640 AIRWAY INHALATION TREATMENT: CPT

## 2022-08-28 PROCEDURE — 80053 COMPREHEN METABOLIC PANEL: CPT | Performed by: STUDENT IN AN ORGANIZED HEALTH CARE EDUCATION/TRAINING PROGRAM

## 2022-08-28 PROCEDURE — 99900035 HC TECH TIME PER 15 MIN (STAT)

## 2022-08-28 PROCEDURE — 85025 COMPLETE CBC W/AUTO DIFF WBC: CPT | Performed by: STUDENT IN AN ORGANIZED HEALTH CARE EDUCATION/TRAINING PROGRAM

## 2022-08-28 PROCEDURE — 11000001 HC ACUTE MED/SURG PRIVATE ROOM

## 2022-08-28 RX ADMIN — ONDANSETRON 8 MG: 2 INJECTION INTRAMUSCULAR; INTRAVENOUS at 02:08

## 2022-08-28 RX ADMIN — CHOLECALCIFEROL TAB 125 MCG (5000 UNIT) 5000 UNITS: 125 TAB at 07:08

## 2022-08-28 RX ADMIN — MIRTAZAPINE 15 MG: 15 TABLET, FILM COATED ORAL at 09:08

## 2022-08-28 RX ADMIN — FOLIC ACID 1 MG: 1 TABLET ORAL at 07:08

## 2022-08-28 RX ADMIN — OXYCODONE HYDROCHLORIDE 30 MG: 10 TABLET ORAL at 08:08

## 2022-08-28 RX ADMIN — DOCUSATE SODIUM AND SENNOSIDES 1 TABLET: 8.6; 5 TABLET, FILM COATED ORAL at 07:08

## 2022-08-28 RX ADMIN — ONDANSETRON 8 MG: 2 INJECTION INTRAMUSCULAR; INTRAVENOUS at 06:08

## 2022-08-28 RX ADMIN — ONDANSETRON 8 MG: 2 INJECTION INTRAMUSCULAR; INTRAVENOUS at 09:08

## 2022-08-28 RX ADMIN — PROMETHAZINE HYDROCHLORIDE 12.5 MG: 25 INJECTION INTRAMUSCULAR; INTRAVENOUS at 09:08

## 2022-08-28 RX ADMIN — LEVOTHYROXINE SODIUM 125 MCG: 0.03 TABLET ORAL at 05:08

## 2022-08-28 RX ADMIN — DULOXETINE 60 MG: 30 CAPSULE, DELAYED RELEASE ORAL at 07:08

## 2022-08-28 RX ADMIN — IRON SUCROSE 300 MG: 20 INJECTION, SOLUTION INTRAVENOUS at 05:08

## 2022-08-28 RX ADMIN — OXYCODONE HYDROCHLORIDE 30 MG: 10 TABLET ORAL at 12:08

## 2022-08-28 RX ADMIN — SERTRALINE HYDROCHLORIDE 200 MG: 50 TABLET ORAL at 07:08

## 2022-08-28 RX ADMIN — ACETAMINOPHEN 650 MG: 325 TABLET ORAL at 02:08

## 2022-08-28 RX ADMIN — OXCARBAZEPINE 900 MG: 150 TABLET, FILM COATED ORAL at 07:08

## 2022-08-28 RX ADMIN — DOCUSATE SODIUM AND SENNOSIDES 1 TABLET: 8.6; 5 TABLET, FILM COATED ORAL at 09:08

## 2022-08-28 RX ADMIN — OXYCODONE HYDROCHLORIDE 30 MG: 10 TABLET ORAL at 05:08

## 2022-08-28 RX ADMIN — ONDANSETRON 8 MG: 2 INJECTION INTRAMUSCULAR; INTRAVENOUS at 12:08

## 2022-08-28 RX ADMIN — OXYCODONE HYDROCHLORIDE AND ACETAMINOPHEN 500 MG: 500 TABLET ORAL at 07:08

## 2022-08-28 RX ADMIN — PYRIDOXINE HCL TAB 50 MG 50 MG: 50 TAB at 07:08

## 2022-08-28 RX ADMIN — FLUTICASONE FUROATE AND VILANTEROL TRIFENATATE 1 PUFF: 100; 25 POWDER RESPIRATORY (INHALATION) at 07:08

## 2022-08-28 RX ADMIN — PROMETHAZINE HYDROCHLORIDE 12.5 MG: 25 INJECTION INTRAMUSCULAR; INTRAVENOUS at 06:08

## 2022-08-28 RX ADMIN — SODIUM CHLORIDE, POTASSIUM CHLORIDE, SODIUM LACTATE AND CALCIUM CHLORIDE: 600; 310; 30; 20 INJECTION, SOLUTION INTRAVENOUS at 09:08

## 2022-08-28 RX ADMIN — OXCARBAZEPINE 900 MG: 150 TABLET, FILM COATED ORAL at 09:08

## 2022-08-28 RX ADMIN — DIVALPROEX SODIUM 500 MG: 250 TABLET, EXTENDED RELEASE ORAL at 09:08

## 2022-08-28 NOTE — SUBJECTIVE & OBJECTIVE
"Interval History: see "Hospital Course"    Review of Systems   Constitutional:  Positive for fatigue. Negative for chills and fever.   HENT:  Negative for trouble swallowing.    Respiratory:  Negative for cough and shortness of breath.    Gastrointestinal:  Positive for nausea and vomiting.   Genitourinary:  Negative for difficulty urinating and dysuria.   Neurological:  Positive for dizziness and light-headedness.   All other systems reviewed and are negative.  Objective:     Vital Signs (Most Recent):  Temp: 98.8 °F (37.1 °C) (08/28/22 0718)  Pulse: 102 (08/28/22 0739)  Resp: 16 (08/28/22 0834)  BP: 121/60 (08/28/22 0718)  SpO2: 95 % (08/28/22 0739) Vital Signs (24h Range):  Temp:  [97.7 °F (36.5 °C)-98.8 °F (37.1 °C)] 98.8 °F (37.1 °C)  Pulse:  [] 102  Resp:  [15-18] 16  SpO2:  [95 %-99 %] 95 %  BP: (102-124)/(57-70) 121/60     Weight: 58.1 kg (128 lb)  Body mass index is 23.41 kg/m².    Intake/Output Summary (Last 24 hours) at 8/28/2022 0946  Last data filed at 8/28/2022 0453  Gross per 24 hour   Intake 3690 ml   Output 400 ml   Net 3290 ml      Physical Exam  Constitutional:       Appearance: Normal appearance.   HENT:      Head: Normocephalic and atraumatic.      Right Ear: External ear normal.      Left Ear: External ear normal.      Nose: Nose normal.      Mouth/Throat:      Mouth: Mucous membranes are moist.      Pharynx: Oropharynx is clear.   Eyes:      Extraocular Movements: Extraocular movements intact.      Conjunctiva/sclera: Conjunctivae normal.   Cardiovascular:      Rate and Rhythm: Regular rhythm. Tachycardia present.      Pulses: Normal pulses.      Heart sounds: Normal heart sounds.   Pulmonary:      Effort: Pulmonary effort is normal.      Breath sounds: Normal breath sounds.   Abdominal:      General: Abdomen is flat. Bowel sounds are normal.      Palpations: Abdomen is soft.   Musculoskeletal:         General: Normal range of motion.      Cervical back: Normal range of motion.   Skin:   "   General: Skin is warm and dry.   Neurological:      General: No focal deficit present.      Mental Status: She is alert and oriented to person, place, and time. Mental status is at baseline.   Psychiatric:         Mood and Affect: Mood normal.         Behavior: Behavior normal.       Significant Labs: All pertinent labs within the past 24 hours have been reviewed.    Significant Imaging: I have reviewed all pertinent imaging results/findings within the past 24 hours.

## 2022-08-28 NOTE — CARE UPDATE
08/27/22 2004   Patient Assessment/Suction   Level of Consciousness (AVPU) alert   Respiratory Effort Unlabored;Normal   Expansion/Accessory Muscles/Retractions no use of accessory muscles;no retractions;expansion symmetric   All Lung Fields Breath Sounds Anterior:;Lateral:;clear   Rhythm/Pattern, Respiratory no shortness of breath reported;unlabored;pattern regular;depth regular   PRE-TX-O2   O2 Device (Oxygen Therapy) room air   SpO2 96 %   Pulse Oximetry Type Intermittent   $ Pulse Oximetry - Multiple Charge Pulse Oximetry - Multiple   Pulse 95   Resp 18   Aerosol Therapy   $ Aerosol Therapy Charges PRN treatment not required   Respiratory Treatment Status (SVN) PRN treatment not required

## 2022-08-28 NOTE — PROGRESS NOTES
Left message on daughter's phone Bozena of patient transfer to 2nd room 221, patient also stated she would inform family

## 2022-08-28 NOTE — PLAN OF CARE
08/28/22 0739   Patient Assessment/Suction   Respiratory Effort Unlabored   Expansion/Accessory Muscles/Retractions expansion symmetric;no retractions   All Lung Fields Breath Sounds Anterior:;Lateral:   Rhythm/Pattern, Respiratory depth regular;pattern regular   PRE-TX-O2   O2 Device (Oxygen Therapy) room air   SpO2 95 %   Pulse Oximetry Type Intermittent   $ Pulse Oximetry - Multiple Charge Pulse Oximetry - Multiple   Pulse 102   Resp 16   Aerosol Therapy   $ Aerosol Therapy Charges PRN treatment not required   Inhaler   $ Inhaler Charges MDI (Metered Dose Inahler) Treatment;Mouth rinsed post treatment   Respiratory Treatment Status (Inhaler) given   Treatment Route (Inhaler) mouthpiece   Patient Position (Inhaler) sitting on edge of bed   Post Treatment Assessment (Inhaler) breath sounds unchanged   Signs of Intolerance (Inhaler) none

## 2022-08-28 NOTE — PROGRESS NOTES
Pt remains free of falls, VS stable, RR even and unlabored, Abd non-distended, BS in all four quadrants, clear speech, makes needs known, bed in low position with wheels locked call light in reach, patient tranferring to 2nd floor room 221 due to fourth floor closing, report given to Lizandro who agrees to provide care at this time

## 2022-08-28 NOTE — ASSESSMENT & PLAN NOTE
Likely secondary to vomiting induced dehydration. Orthostatic vitals negative 8/27/2022.  -PRN antiemetics  -Compression  -IV fluids  -Encourage PO intake

## 2022-08-28 NOTE — PLAN OF CARE
Problem: Physical Therapy  Goal: Physical Therapy Goal  Description: Goals to be met by: 2022     Patient will increase functional independence with mobility by performin. Supine to sit with Modified [place]  2. Sit to stand transfer with Stand-by Assistance  3. Bed to chair transfer with Stand-by Assistance using Rolling Walker  4. Gait  x over 500 feet with Stand-by Assistance using Rolling Walker.   5. Lower extremity exercise program x20 reps   Outcome: Ongoing, Progressing   PT eval and treat. Gait with RW 250ft x2 with CGA. Will need RW for d/c

## 2022-08-28 NOTE — PT/OT/SLP EVAL
Physical Therapy Evaluation    Patient Name:  Ayana Anthony   MRN:  [id number]    Recommendations:     Discharge Recommendations:  home   Discharge Equipment Recommendations: walker, rolling   Barriers to discharge: None    Assessment:     Ayana Anthony is a 52 y.o. female admitted with a medical diagnosis of Dehydration.  She presents with the following impairments/functional limitations:  weakness, impaired endurance, impaired functional mobility, gait instability, pain .    Pt seen supine in bed- stated of nausea/tired and epigastric discomfort. Pt requiring min assist for safe mobility and ambulated 250ft x2 with RW- slow but steady shin..    Rehab Prognosis: Fair; patient would benefit from acute skilled PT services to address these deficits and reach maximum level of function.    Recent Surgery: * No surgery found *      Plan:     During this hospitalization, patient to be seen 6 x/week to address the identified rehab impairments via gait training, therapeutic activities, therapeutic exercises and progress toward the following goals:    Plan of Care Expires:  09/30/22    Subjective     Chief Complaint: nauseated/not feeling too well  Patient/Family Comments/goals: get better  Pain/Comfort:  Pain Rating 1:  (not rated)  Location 1: abdomen  Pain Addressed 1: Reposition, Distraction    Patients cultural, spiritual, Mandaen conflicts given the current situation:      Living Environment:  Home with family  Prior to admission, patients level of function was ambulatory.  Equipment used at home: none.  DME owned (not currently used): none.  Upon discharge, patient will have assistance from family.    Objective:     Communicated with nurse Boyer prior to session.  Patient found HOB elevated with peripheral IV, bed alarm, SCD, telemetry ([employer])  upon PT entry to room.    General Precautions: Standard, fall   Orthopedic Precautions:N/A   Braces: N/A  Respiratory Status: Room air    Exams:  Postural Exam:   Patient presented with the following abnormalities:    -       Rounded shoulders  -       Forward head  -       BMI 23  LUE Strength: WFL  RLE ROM: WFL  RLE Strength: WFL  LLE ROM: WFL    Functional Mobility:  Bed Mobility:     Rolling Left:  stand by assistance  Scooting: contact guard assistance  Supine to Sit: contact guard assistance  Transfers:     Sit to Stand:  contact guard assistance with rolling walker  Gait: 250ft x2 with RW slow shin    Therapeutic Activities and Exercises:   Patient was educated on the importance of OOB activity and functional mobility to negate negative effects of prolonged bed rest during hospitalization, safe transfers and ambulation, and D/C planning   Back to bed post PT due to feeling of nausea  SCD re applied    AM-PAC 6 CLICK MOBILITY  Total Score:17     Patient left HOB elevated with all lines intact, call button in reach, and bed alarm on.    GOALS:   Multidisciplinary Problems       Physical Therapy Goals          Problem: Physical Therapy    Goal Priority Disciplines Outcome Goal Variances Interventions   Physical Therapy Goal     PT, PT/OT Ongoing, Progressing     Description: Goals to be met by: 2022     Patient will increase functional independence with mobility by performin. Supine to sit with Modified [place]  2. Sit to stand transfer with Stand-by Assistance  3. Bed to chair transfer with Stand-by Assistance using Rolling Walker  4. Gait  x over 500 feet with Stand-by Assistance using Rolling Walker.   5. Lower extremity exercise program x20 reps                        History:     Past Medical History:   Diagnosis Date    B12 deficiency anemia     Chronic pain     CKD (chronic kidney disease) stage 3, GFR 30-59 ml/min     Depression     Epilepsy     Hematuria     Hypertension     Hypokalemia     Insomnia     Migraines     Proteinuria        Past Surgical History:   Procedure Laterality Date    APPENDECTOMY      BELT ABDOMINOPLASTY      breast  implants  2013    BREAST SURGERY Bilateral     2013     SECTION, LOW TRANSVERSE      x3    CHOLECYSTECTOMY      GASTRIC BYPASS      HYSTERECTOMY      REDUCTION MAMMAPLASTY      reduction and implants    TONSILLECTOMY, ADENOIDECTOMY         Time Tracking:     PT Received On: 22  PT Start Time: 856     PT Stop Time: 913  PT Total Time (min): 17 min     Billable Minutes: Evaluation 8 and Gait Training 9      2022

## 2022-08-28 NOTE — PROGRESS NOTES
[facility] Hospital Medicine  Progress Note    Patient Name: Ayana Anthony  MRN: [id number]  Patient Class: IP- Inpatient   Admission Date: 8/24/2022  Length of Stay: 2 days  Attending Physician: Sunil Edmondson MD  Primary Care Provider: Rey Oconnell NP        Subjective:     Principal Problem:Dehydration        HPI:  Ayana Anthony is a 52-year-old female with past medical history significant for anemia, chronic kidney disease, hyperkalemia, hypertension, epilepsy, and chronic pain who presented to the emergency department at the direction of her pulmonologist.  She had an abnormal potassium of 2.6 this morning at her pulmonology appointment.  She reports LLQ abdominal pain, left flank pain, decreased urine output and difficulty voiding which has been ongoing for about 2 weeks.  No hematuria or dysuria.  Denies recent fevers or chest pain.  She feels generally weak and is dyspneic on exertion.  Surgical history includes gastric bypass, hysterectomy, cholecystectomy, and abdominoplasty.  Workup in the emergency department is significant for potassium 2.9 and creatinine 1.8.  She will be placed on observation the service of hospital Medicine for continued monitoring and treatment.      Overview/Hospital Course:  Ayana Anthony is a 52 year old female with a past medical history of ORQUIDEA, HTN, seizure disorder, HTN, asthma, hypothyroidism, MDD/LUCY, gastric bypass, and chronic pain who presented with difficulty urinating, hypotension, weakness, ALEX and hypokalemia secondary to dehydration. She was placed on IV fluids which improved her kidney function and blood pressure. Compression stockings were also ordered. Her potassium was aggressively repleted. Her course was complicated by nausea and vomiting possibly brought on by constipation seen on CT scan. Her constipation did resolve with senna, docusate and lactulose. She is currently tolerating PO and is using IV antiemetics as needed. She was  "discovered to have ORQUIDEA for which IV iron has been started. She will need to have a colonoscopy outpatient. She has shown no evidence of gross bleed during her course. PT/OT has been consulted.      Interval History: see "Hospital Course"    Review of Systems   Constitutional:  Positive for fatigue. Negative for chills and fever.   HENT:  Negative for trouble swallowing.    Respiratory:  Negative for cough and shortness of breath.    Gastrointestinal:  Positive for nausea and vomiting.   Genitourinary:  Negative for difficulty urinating and dysuria.   Neurological:  Positive for dizziness and light-headedness.   All other systems reviewed and are negative.  Objective:     Vital Signs (Most Recent):  Temp: 98.8 °F (37.1 °C) (08/28/22 0718)  Pulse: 102 (08/28/22 0739)  Resp: 16 (08/28/22 0834)  BP: 121/60 (08/28/22 0718)  SpO2: 95 % (08/28/22 0739) Vital Signs (24h Range):  Temp:  [97.7 °F (36.5 °C)-98.8 °F (37.1 °C)] 98.8 °F (37.1 °C)  Pulse:  [] 102  Resp:  [15-18] 16  SpO2:  [95 %-99 %] 95 %  BP: (102-124)/(57-70) 121/60     Weight: 58.1 kg (128 lb)  Body mass index is 23.41 kg/m².    Intake/Output Summary (Last 24 hours) at 8/28/2022 0946  Last data filed at 8/28/2022 0453  Gross per 24 hour   Intake 3690 ml   Output 400 ml   Net 3290 ml      Physical Exam  Constitutional:       Appearance: Normal appearance.   HENT:      Head: Normocephalic and atraumatic.      Right Ear: External ear normal.      Left Ear: External ear normal.      Nose: Nose normal.      Mouth/Throat:      Mouth: Mucous membranes are moist.      Pharynx: Oropharynx is clear.   Eyes:      Extraocular Movements: Extraocular movements intact.      Conjunctiva/sclera: Conjunctivae normal.   Cardiovascular:      Rate and Rhythm: Regular rhythm. Tachycardia present.      Pulses: Normal pulses.      Heart sounds: Normal heart sounds.   Pulmonary:      Effort: Pulmonary effort is normal.      Breath sounds: Normal breath sounds.   Abdominal:      " General: Abdomen is flat. Bowel sounds are normal.      Palpations: Abdomen is soft.   Musculoskeletal:         General: Normal range of motion.      Cervical back: Normal range of motion.   Skin:     General: Skin is warm and dry.   Neurological:      General: No focal deficit present.      Mental Status: She is alert and oriented to person, place, and time. Mental status is at baseline.   Psychiatric:         Mood and Affect: Mood normal.         Behavior: Behavior normal.       Significant Labs: All pertinent labs within the past 24 hours have been reviewed.    Significant Imaging: I have reviewed all pertinent imaging results/findings within the past 24 hours.      Assessment/Plan:      * Dehydration  Likely secondary to vomiting induced dehydration. Orthostatic vitals negative 8/27/2022.  -PRN antiemetics  -Compression  -IV fluids  -Encourage PO intake      Asthma  -Continue home inhalers  -Duonebs PRN      Hypothyroidism  -Continue Synthroid      Vitamin B6 deficiency  -Continue repletion      Chronic pain  -Continue duloxetine  -Continue home opiate regimen      Vitamin D deficiency  -Continue repletion      Iron deficiency anemia  -IV iron repletion  -Patient will need colonoscopy if not done so      HTN (hypertension)  -Hold home amlodipine given normal/low BPs      Epilepsy  -Continue home antiepileptics  -Fall, aspiration and seizure precautions      Insomnia  -Remeron QHS      Depression  -Continue sertraline          VTE Risk Mitigation (From admission, onward)         Ordered     Place CRISTINE hose  Until discontinued         08/26/22 1011     IP VTE LOW RISK PATIENT  Once         08/24/22 2113     Place sequential compression device  Until discontinued         08/24/22 2113                Discharge Planning   AKI: 8/27/2022     Code Status: Full Code   Is the patient medically ready for discharge?:     Reason for patient still in hospital (select all that apply): Patient trending condition, Treatment, PT  / OT recommendations and Pending disposition  Discharge Plan A: Home                  Sunil Edmondson MD  Department of Hospital Medicine   [facility]

## 2022-08-28 NOTE — PLAN OF CARE
Plan of care reviewed with patient. Patient verbalized complete understanding. Pt awake, alert, and oriented. Pt complaining of pain. Pt complaining of nausea, meds given. IV CDI.  All fall precautions maintained, bed in lowest position, locked, call light within reach. Side rails up times 2. Slip resistant socks maintained.

## 2022-08-29 ENCOUNTER — PATIENT MESSAGE (OUTPATIENT)
Dept: PULMONOLOGY | Facility: CLINIC | Age: 52
End: 2022-08-29
Payer: MEDICARE

## 2022-08-29 LAB
ALBUMIN SERPL BCP-MCNC: 2.6 G/DL (ref 3.5–5.2)
ALP SERPL-CCNC: 70 U/L (ref 55–135)
ALT SERPL W/O P-5'-P-CCNC: 29 U/L (ref 10–44)
ANION GAP SERPL CALC-SCNC: 6 MMOL/L (ref 8–16)
AST SERPL-CCNC: 28 U/L (ref 10–40)
BASOPHILS # BLD AUTO: 0.09 K/UL (ref 0–0.2)
BASOPHILS NFR BLD: 1.5 % (ref 0–1.9)
BILIRUB SERPL-MCNC: 0.2 MG/DL (ref 0.1–1)
BUN SERPL-MCNC: 8 MG/DL (ref 6–20)
CALCIUM SERPL-MCNC: 8.4 MG/DL (ref 8.7–10.5)
CHLORIDE SERPL-SCNC: 114 MMOL/L (ref 95–110)
CO2 SERPL-SCNC: 20 MMOL/L (ref 23–29)
CREAT SERPL-MCNC: 0.9 MG/DL (ref 0.5–1.4)
DIFFERENTIAL METHOD: ABNORMAL
EOSINOPHIL # BLD AUTO: 0.6 K/UL (ref 0–0.5)
EOSINOPHIL NFR BLD: 9.9 % (ref 0–8)
ERYTHROCYTE [DISTWIDTH] IN BLOOD BY AUTOMATED COUNT: 17.2 % (ref 11.5–14.5)
EST. GFR  (NO RACE VARIABLE): >60 ML/MIN/1.73 M^2
GLUCOSE SERPL-MCNC: 78 MG/DL (ref 70–110)
HCT VFR BLD AUTO: 23.1 % (ref 37–48.5)
HGB BLD-MCNC: 7.4 G/DL (ref 12–16)
IMM GRANULOCYTES # BLD AUTO: 0.04 K/UL (ref 0–0.04)
IMM GRANULOCYTES NFR BLD AUTO: 0.7 % (ref 0–0.5)
LYMPHOCYTES # BLD AUTO: 1.9 K/UL (ref 1–4.8)
LYMPHOCYTES NFR BLD: 31.7 % (ref 18–48)
MAGNESIUM SERPL-MCNC: 1.5 MG/DL (ref 1.6–2.6)
MCH RBC QN AUTO: 27.8 PG (ref 27–31)
MCHC RBC AUTO-ENTMCNC: 32 G/DL (ref 32–36)
MCV RBC AUTO: 87 FL (ref 82–98)
MONOCYTES # BLD AUTO: 1 K/UL (ref 0.3–1)
MONOCYTES NFR BLD: 17.3 % (ref 4–15)
NEUTROPHILS # BLD AUTO: 2.3 K/UL (ref 1.8–7.7)
NEUTROPHILS NFR BLD: 38.9 % (ref 38–73)
NRBC BLD-RTO: 0 /100 WBC
PHOSPHATE SERPL-MCNC: 3.5 MG/DL (ref 2.7–4.5)
PLATELET # BLD AUTO: 354 K/UL (ref 150–450)
PMV BLD AUTO: 9.6 FL (ref 9.2–12.9)
POTASSIUM SERPL-SCNC: 4.5 MMOL/L (ref 3.5–5.1)
PROT SERPL-MCNC: 5.3 G/DL (ref 6–8.4)
RBC # BLD AUTO: 2.66 M/UL (ref 4–5.4)
SODIUM SERPL-SCNC: 140 MMOL/L (ref 136–145)
WBC # BLD AUTO: 5.83 K/UL (ref 3.9–12.7)

## 2022-08-29 PROCEDURE — 80053 COMPREHEN METABOLIC PANEL: CPT | Performed by: STUDENT IN AN ORGANIZED HEALTH CARE EDUCATION/TRAINING PROGRAM

## 2022-08-29 PROCEDURE — 99900035 HC TECH TIME PER 15 MIN (STAT)

## 2022-08-29 PROCEDURE — 36415 COLL VENOUS BLD VENIPUNCTURE: CPT | Performed by: STUDENT IN AN ORGANIZED HEALTH CARE EDUCATION/TRAINING PROGRAM

## 2022-08-29 PROCEDURE — 11000001 HC ACUTE MED/SURG PRIVATE ROOM

## 2022-08-29 PROCEDURE — 94640 AIRWAY INHALATION TREATMENT: CPT

## 2022-08-29 PROCEDURE — 25000003 PHARM REV CODE 250: Performed by: STUDENT IN AN ORGANIZED HEALTH CARE EDUCATION/TRAINING PROGRAM

## 2022-08-29 PROCEDURE — 94761 N-INVAS EAR/PLS OXIMETRY MLT: CPT

## 2022-08-29 PROCEDURE — 25000003 PHARM REV CODE 250: Performed by: NURSE PRACTITIONER

## 2022-08-29 PROCEDURE — 63600175 PHARM REV CODE 636 W HCPCS: Performed by: STUDENT IN AN ORGANIZED HEALTH CARE EDUCATION/TRAINING PROGRAM

## 2022-08-29 PROCEDURE — 97116 GAIT TRAINING THERAPY: CPT | Mod: CQ

## 2022-08-29 PROCEDURE — 85025 COMPLETE CBC W/AUTO DIFF WBC: CPT | Performed by: STUDENT IN AN ORGANIZED HEALTH CARE EDUCATION/TRAINING PROGRAM

## 2022-08-29 PROCEDURE — 97530 THERAPEUTIC ACTIVITIES: CPT | Mod: CQ

## 2022-08-29 PROCEDURE — 83735 ASSAY OF MAGNESIUM: CPT | Performed by: STUDENT IN AN ORGANIZED HEALTH CARE EDUCATION/TRAINING PROGRAM

## 2022-08-29 PROCEDURE — 84100 ASSAY OF PHOSPHORUS: CPT | Performed by: STUDENT IN AN ORGANIZED HEALTH CARE EDUCATION/TRAINING PROGRAM

## 2022-08-29 PROCEDURE — 97165 OT EVAL LOW COMPLEX 30 MIN: CPT

## 2022-08-29 RX ADMIN — SERTRALINE HYDROCHLORIDE 200 MG: 50 TABLET ORAL at 08:08

## 2022-08-29 RX ADMIN — PROMETHAZINE HYDROCHLORIDE 12.5 MG: 25 INJECTION INTRAMUSCULAR; INTRAVENOUS at 05:08

## 2022-08-29 RX ADMIN — DOCUSATE SODIUM AND SENNOSIDES 1 TABLET: 8.6; 5 TABLET, FILM COATED ORAL at 08:08

## 2022-08-29 RX ADMIN — CHOLECALCIFEROL TAB 125 MCG (5000 UNIT) 5000 UNITS: 125 TAB at 08:08

## 2022-08-29 RX ADMIN — OXYCODONE HYDROCHLORIDE 30 MG: 10 TABLET ORAL at 10:08

## 2022-08-29 RX ADMIN — OXYCODONE HYDROCHLORIDE AND ACETAMINOPHEN 500 MG: 500 TABLET ORAL at 08:08

## 2022-08-29 RX ADMIN — MIRTAZAPINE 15 MG: 15 TABLET, FILM COATED ORAL at 08:08

## 2022-08-29 RX ADMIN — PROMETHAZINE HYDROCHLORIDE 12.5 MG: 25 INJECTION INTRAMUSCULAR; INTRAVENOUS at 08:08

## 2022-08-29 RX ADMIN — ACETAMINOPHEN 650 MG: 325 TABLET ORAL at 10:08

## 2022-08-29 RX ADMIN — DIVALPROEX SODIUM 500 MG: 250 TABLET, EXTENDED RELEASE ORAL at 08:08

## 2022-08-29 RX ADMIN — ONDANSETRON 8 MG: 2 INJECTION INTRAMUSCULAR; INTRAVENOUS at 06:08

## 2022-08-29 RX ADMIN — OXYCODONE HYDROCHLORIDE 30 MG: 10 TABLET ORAL at 02:08

## 2022-08-29 RX ADMIN — FLUTICASONE FUROATE AND VILANTEROL TRIFENATATE 1 PUFF: 100; 25 POWDER RESPIRATORY (INHALATION) at 08:08

## 2022-08-29 RX ADMIN — IRON SUCROSE 300 MG: 20 INJECTION, SOLUTION INTRAVENOUS at 06:08

## 2022-08-29 RX ADMIN — ONDANSETRON 8 MG: 2 INJECTION INTRAMUSCULAR; INTRAVENOUS at 02:08

## 2022-08-29 RX ADMIN — SODIUM CHLORIDE, POTASSIUM CHLORIDE, SODIUM LACTATE AND CALCIUM CHLORIDE: 600; 310; 30; 20 INJECTION, SOLUTION INTRAVENOUS at 05:08

## 2022-08-29 RX ADMIN — PROMETHAZINE HYDROCHLORIDE 12.5 MG: 25 INJECTION INTRAMUSCULAR; INTRAVENOUS at 02:08

## 2022-08-29 RX ADMIN — ONDANSETRON 8 MG: 2 INJECTION INTRAMUSCULAR; INTRAVENOUS at 09:08

## 2022-08-29 RX ADMIN — PYRIDOXINE HCL TAB 50 MG 50 MG: 50 TAB at 08:08

## 2022-08-29 RX ADMIN — DULOXETINE 60 MG: 30 CAPSULE, DELAYED RELEASE ORAL at 08:08

## 2022-08-29 RX ADMIN — OXCARBAZEPINE 900 MG: 150 TABLET, FILM COATED ORAL at 08:08

## 2022-08-29 RX ADMIN — LEVOTHYROXINE SODIUM 125 MCG: 0.03 TABLET ORAL at 06:08

## 2022-08-29 RX ADMIN — FOLIC ACID 1 MG: 1 TABLET ORAL at 08:08

## 2022-08-29 NOTE — PLAN OF CARE
Problem: Occupational Therapy  Goal: Occupational Therapy Goal  Description: Goals to be met by: 9/12/2022     Patient will increase functional independence with ADLs by performing:    UE Dressing with [place].  LE Dressing with Modified [place].  Grooming with Modified [place].  Toileting from toilet with Modified [place] for hygiene and clothing management.   Toilet transfer to toilet with Modified [place].    Outcome: Ongoing, Progressing    OT evaluation completed. POC established.

## 2022-08-29 NOTE — PLAN OF CARE
Pt alert and oriented. Ambulates to bathroom. Pain meds given as needed.  at bedside. Plan of care continued. Call light in reach

## 2022-08-29 NOTE — SUBJECTIVE & OBJECTIVE
"Interval History: see "Hospital Course"    Review of Systems   Constitutional:  Positive for fatigue. Negative for chills and fever.   HENT:  Negative for trouble swallowing.    Respiratory:  Negative for cough and shortness of breath.    Gastrointestinal:  Positive for nausea and vomiting.   Genitourinary:  Negative for difficulty urinating and dysuria.   Neurological:  Positive for dizziness and light-headedness.   All other systems reviewed and are negative.  Objective:     Vital Signs (Most Recent):  Temp: 97.4 °F (36.3 °C) (08/29/22 0804)  Pulse: 105 (08/29/22 0840)  Resp: 20 (08/29/22 0840)  BP: 115/65 (08/29/22 0804)  SpO2: 95 % (08/29/22 0840) Vital Signs (24h Range):  Temp:  [97.4 °F (36.3 °C)-99.2 °F (37.3 °C)] 97.4 °F (36.3 °C)  Pulse:  [] 105  Resp:  [15-20] 20  SpO2:  [94 %-97 %] 95 %  BP: (111-153)/(59-86) 115/65     Weight: 58.1 kg (128 lb)  Body mass index is 23.41 kg/m².    Intake/Output Summary (Last 24 hours) at 8/29/2022 1106  Last data filed at 8/29/2022 0800  Gross per 24 hour   Intake 2318.32 ml   Output 1 ml   Net 2317.32 ml      Physical Exam  Constitutional:       Appearance: Normal appearance.   HENT:      Head: Normocephalic and atraumatic.      Right Ear: External ear normal.      Left Ear: External ear normal.      Nose: Nose normal.      Mouth/Throat:      Mouth: Mucous membranes are moist.      Pharynx: Oropharynx is clear.   Eyes:      Extraocular Movements: Extraocular movements intact.      Conjunctiva/sclera: Conjunctivae normal.   Cardiovascular:      Rate and Rhythm: Regular rhythm. Tachycardia present.      Pulses: Normal pulses.      Heart sounds: Normal heart sounds.   Pulmonary:      Effort: Pulmonary effort is normal.      Breath sounds: Normal breath sounds.   Abdominal:      General: Abdomen is flat. Bowel sounds are normal.      Palpations: Abdomen is soft.   Musculoskeletal:         General: Normal range of motion.      Cervical back: Normal range of motion. "   Skin:     General: Skin is warm and dry.   Neurological:      General: No focal deficit present.      Mental Status: She is alert and oriented to person, place, and time. Mental status is at baseline.   Psychiatric:         Mood and Affect: Mood normal.         Behavior: Behavior normal.       Significant Labs: All pertinent labs within the past 24 hours have been reviewed.    Significant Imaging: I have reviewed all pertinent imaging results/findings within the past 24 hours.

## 2022-08-29 NOTE — PLAN OF CARE
Problem: Physical Therapy  Goal: Physical Therapy Goal  Description: Goals to be met by: 2022     Patient will increase functional independence with mobility by performin. Supine to sit with Modified [place]  2. Sit to stand transfer with Stand-by Assistance  3. Bed to chair transfer with Stand-by Assistance using Rolling Walker  4. Gait  x over 500 feet with Stand-by Assistance using Rolling Walker.   5. Lower extremity exercise program x20 reps   Outcome: Ongoing, Progressing   Ambulate with rw and assistance for safety.

## 2022-08-29 NOTE — PROGRESS NOTES
[facility] Hospital Medicine  Progress Note    Patient Name: Ayana Anthony  MRN: [id number]  Patient Class: IP- Inpatient   Admission Date: 8/24/2022  Length of Stay: 3 days  Attending Physician: Sunil Edmondson MD  Primary Care Provider: Rey Oconnell NP        Subjective:     Principal Problem:Dehydration        HPI:  Ayana Anthony is a 52-year-old female with past medical history significant for anemia, chronic kidney disease, hyperkalemia, hypertension, epilepsy, and chronic pain who presented to the emergency department at the direction of her pulmonologist.  She had an abnormal potassium of 2.6 this morning at her pulmonology appointment.  She reports LLQ abdominal pain, left flank pain, decreased urine output and difficulty voiding which has been ongoing for about 2 weeks.  No hematuria or dysuria.  Denies recent fevers or chest pain.  She feels generally weak and is dyspneic on exertion.  Surgical history includes gastric bypass, hysterectomy, cholecystectomy, and abdominoplasty.  Workup in the emergency department is significant for potassium 2.9 and creatinine 1.8.  She will be placed on observation the service of hospital Medicine for continued monitoring and treatment.      Overview/Hospital Course:  Ayana Anthony is a 52 year old female with a past medical history of ORQUIDEA, HTN, seizure disorder, HTN, asthma, hypothyroidism, MDD/LUCY, gastric bypass, and chronic pain who presented with difficulty urinating, hypotension, weakness, ALEX and hypokalemia secondary to dehydration. She was placed on IV fluids which improved her kidney function and blood pressure. Compression stockings were also ordered. Her potassium was aggressively repleted. Her course was complicated by nausea and vomiting possibly brought on by constipation seen on CT scan. Her constipation did resolve with senna, docusate and lactulose. She is currently tolerating PO and is using IV antiemetics as needed. She was  "discovered to have ORQUIDEA for which IV iron has been started. She will need to have a colonoscopy outpatient. She has shown no evidence of gross bleed during her course. PT/OT has been consulted.      Interval History: see "Hospital Course"    Review of Systems   Constitutional:  Positive for fatigue. Negative for chills and fever.   HENT:  Negative for trouble swallowing.    Respiratory:  Negative for cough and shortness of breath.    Gastrointestinal:  Positive for nausea and vomiting.   Genitourinary:  Negative for difficulty urinating and dysuria.   Neurological:  Positive for dizziness and light-headedness.   All other systems reviewed and are negative.  Objective:     Vital Signs (Most Recent):  Temp: 97.4 °F (36.3 °C) (08/29/22 0804)  Pulse: 105 (08/29/22 0840)  Resp: 20 (08/29/22 0840)  BP: 115/65 (08/29/22 0804)  SpO2: 95 % (08/29/22 0840) Vital Signs (24h Range):  Temp:  [97.4 °F (36.3 °C)-99.2 °F (37.3 °C)] 97.4 °F (36.3 °C)  Pulse:  [] 105  Resp:  [15-20] 20  SpO2:  [94 %-97 %] 95 %  BP: (111-153)/(59-86) 115/65     Weight: 58.1 kg (128 lb)  Body mass index is 23.41 kg/m².    Intake/Output Summary (Last 24 hours) at 8/29/2022 1106  Last data filed at 8/29/2022 0800  Gross per 24 hour   Intake 2318.32 ml   Output 1 ml   Net 2317.32 ml      Physical Exam  Constitutional:       Appearance: Normal appearance.   HENT:      Head: Normocephalic and atraumatic.      Right Ear: External ear normal.      Left Ear: External ear normal.      Nose: Nose normal.      Mouth/Throat:      Mouth: Mucous membranes are moist.      Pharynx: Oropharynx is clear.   Eyes:      Extraocular Movements: Extraocular movements intact.      Conjunctiva/sclera: Conjunctivae normal.   Cardiovascular:      Rate and Rhythm: Regular rhythm. Tachycardia present.      Pulses: Normal pulses.      Heart sounds: Normal heart sounds.   Pulmonary:      Effort: Pulmonary effort is normal.      Breath sounds: Normal breath sounds.   Abdominal: "      General: Abdomen is flat. Bowel sounds are normal.      Palpations: Abdomen is soft.   Musculoskeletal:         General: Normal range of motion.      Cervical back: Normal range of motion.   Skin:     General: Skin is warm and dry.   Neurological:      General: No focal deficit present.      Mental Status: She is alert and oriented to person, place, and time. Mental status is at baseline.   Psychiatric:         Mood and Affect: Mood normal.         Behavior: Behavior normal.       Significant Labs: All pertinent labs within the past 24 hours have been reviewed.    Significant Imaging: I have reviewed all pertinent imaging results/findings within the past 24 hours.      Assessment/Plan:      * Dehydration  Likely secondary to vomiting induced dehydration. Orthostatic vitals negative 8/27/2022.  -PRN antiemetics  -Compression  -IV fluids  -Encourage PO intake      Asthma  -Continue home inhalers  -Duonebs PRN      Hypothyroidism  -Continue Synthroid      Vitamin B6 deficiency  -Continue repletion      Chronic pain  -Continue duloxetine  -Continue home opiate regimen      Vitamin D deficiency  -Continue repletion      Iron deficiency anemia  -IV iron repletion  -Patient will need colonoscopy if not done so      HTN (hypertension)  -Hold home amlodipine given normal/low BPs      Epilepsy  -Continue home antiepileptics  -Fall, aspiration and seizure precautions      Insomnia  -Remeron QHS      Depression  -Continue sertraline          VTE Risk Mitigation (From admission, onward)         Ordered     Place CRISTINE hose  Until discontinued         08/26/22 1011     IP VTE LOW RISK PATIENT  Once         08/24/22 2113     Place sequential compression device  Until discontinued         08/24/22 2113                Discharge Planning   AKI: 8/27/2022     Code Status: Full Code   Is the patient medically ready for discharge?:     Reason for patient still in hospital (select all that apply): Patient trending condition and  Treatment  Discharge Plan A: Home                  Sunil Edmondson MD  Department of Hospital Medicine   [facility]

## 2022-08-29 NOTE — CARE UPDATE
08/29/22 0840   Patient Assessment/Suction   Level of Consciousness (AVPU) alert   Respiratory Effort Normal;Unlabored   Expansion/Accessory Muscles/Retractions no use of accessory muscles;expansion symmetric   All Lung Fields Breath Sounds clear   Rhythm/Pattern, Respiratory unlabored   Cough Frequency no cough   PRE-TX-O2   O2 Device (Oxygen Therapy) room air   SpO2 95 %   Pulse Oximetry Type Intermittent   $ Pulse Oximetry - Multiple Charge Pulse Oximetry - Multiple   Pulse 105   Resp 20   Aerosol Therapy   $ Aerosol Therapy Charges PRN treatment not required   Inhaler   $ Inhaler Charges MDI (Metered Dose [place]ahler) Treatment   Respiratory Treatment Status (Inhaler) given   Treatment Route (Inhaler) mouthpiece   Patient Position (Inhaler) HOB elevated   Post Treatment Assessment (Inhaler) breath sounds unchanged   Signs of Intolerance (Inhaler) none

## 2022-08-29 NOTE — PLAN OF CARE
POC discussed with pt, pt verbalized understanding. Oriented x4. PIV cdi, infusing. NSR on tele. Complains of pain in lower back and nausea, relieved with prn's, no episodes of vomiting. Ambulated to the restroom with stand by assist. Avasis for safety. Call light in reach, bed alarm set, safety maintained. No complaints or requests at this time, will continue to monitor.

## 2022-08-29 NOTE — PT/OT/SLP EVAL
Occupational Therapy   Evaluation    Name: Ayana Anthony  MRN: [id number]  Admitting Diagnosis:  Dehydration  Recent Surgery: * No surgery found *     Recommendations:     Discharge Recommendations:  home  Discharge Equipment Recommendations:  None  Barriers to discharge:  None    Assessment:     Ayana Anthony is a 52 y.o. female with a medical diagnosis of Dehydration.  She presents with performance deficits affecting function: impaired endurance, impaired self care skills and impaired functional mobility. Pt was agreeable to participate in OT. Pt performed supine <> sit and sit <> stand transfer with RW and CGA. Pt requires min A with LBD and toileting for clothing management secondary to activity tolerance deficits.     Rehab Prognosis: Good; patient would benefit from acute skilled OT services to address these deficits and reach maximum level of function.       Plan:     Patient to be seen 3 x/week to address the above listed problems via self-care/home management, therapeutic activities, therapeutic exercises  Plan of Care Expires: 09/12/22  Plan of Care Reviewed with: patient    Subjective     Chief Complaint: Back pain  Patient/Family Comments/goals: Pt was agreeable to participate in OT.     Occupational Profile:  Living Environment: Pt lives with her family.   Previous level of function: Mod I with ADLs  Equipment Used at Home:  None  Assistance upon Discharge: Pt will have assistance from her family.     Pain/Comfort:  Pain Rating 1: 7/10  Location 1: back  Pain Addressed 1: Nurse notified    Patients cultural, spiritual, Protestant conflicts given the current situation: yes    Objective:     Communicated with: nurse prior to session.  Patient found HOB elevated with peripheral IV, bed alarm, SCD and telemetry upon OT entry to room.    General Precautions: Standard, fall   Orthopedic Precautions:N/A   Braces: N/A  Respiratory Status: Room air    Occupational Performance:    Bed Mobility:    Patient  completed Rolling/Turning to Left with stand by assistance  Patient completed Supine to Sit with stand by assistance  Patient completed Sit to Supine with stand by assistance    Functional Mobility/Transfers:  Patient completed Sit <> Stand Transfer with contact guard assistance with rolling walker     Activities of Daily Living:  Feeding:  independence  Grooming: stand by assistance  Upper Body Dressing: stand by assistance  Lower Body Dressing: minimum assistance  Toileting: minimum assistance    Cognitive/Visual Perceptual:  Cognitive/Psychosocial Skills:  -       Oriented to: Person, Place, Time, and Situation   -       Follows Commands/attention: Follows multistep commands  -       Communication: clear/fluent    Physical Exam:  Upper Extremity Range of Motion:  -       Right Upper Extremity: WFL  -       Left Upper Extremity: WFL  Upper Extremity Strength: -       Right Upper Extremity: WFL  -       Left Upper Extremity: WFL    AMPAC 6 Click ADL:  AMPAC Total Score: 21    Treatment & Education:  Pt was given education on role of OT and POC. Pt verbalized understanding.     Patient left HOB elevated with all lines intact, call button in reach, bed alarm on and significant other present.     GOALS:   Multidisciplinary Problems       Occupational Therapy Goals          Problem: Occupational Therapy    Goal Priority Disciplines Outcome Interventions   Occupational Therapy Goal     OT, PT/OT Ongoing, Progressing    Description: Goals to be met by: 9/12/2022     Patient will increase functional independence with ADLs by performing:    UE Dressing with [place].  LE Dressing with Modified [place].  Grooming with Modified [place].  Toileting from toilet with Modified [place] for hygiene and clothing management.   Toilet transfer to toilet with Modified [place].                         History:     Past Medical History:   Diagnosis Date    B12 deficiency anemia     Chronic pain     CKD (chronic  kidney disease) stage 3, GFR 30-59 ml/min     Depression     Epilepsy     Hematuria     Hypertension     Hypokalemia     Insomnia     Migraines     Proteinuria          Past Surgical History:   Procedure Laterality Date    APPENDECTOMY      BELT ABDOMINOPLASTY      breast implants  2013    BREAST SURGERY Bilateral          SECTION, LOW TRANSVERSE      x3    CHOLECYSTECTOMY      GASTRIC BYPASS      HYSTERECTOMY      REDUCTION MAMMAPLASTY      reduction and implants    TONSILLECTOMY, ADENOIDECTOMY         Time Tracking:     OT Date of Treatment: 22  OT Start Time: 1110  OT Stop Time: 1122  OT Total Time (min): 12 min    Billable Minutes:Evaluation 12    2022

## 2022-08-29 NOTE — CARE UPDATE
08/28/22 1931   Patient Assessment/Suction   Level of Consciousness (AVPU) alert   Respiratory Effort Normal;Unlabored   Expansion/Accessory Muscles/Retractions expansion symmetric   All Lung Fields Breath Sounds clear   Rhythm/Pattern, Respiratory depth regular;pattern regular   Cough Frequency no cough   PRE-TX-O2   O2 Device (Oxygen Therapy) room air   SpO2 (!) 94 %   Pulse Oximetry Type Intermittent   Aerosol Therapy   $ Aerosol Therapy Charges PRN treatment not required

## 2022-08-29 NOTE — PT/OT/SLP PROGRESS
Physical Therapy Treatment    Patient Name:  Ayana Anthony   MRN:  [id number]    Recommendations:     Discharge Recommendations:  home   Discharge Equipment Recommendations: walker, rolling   Barriers to discharge: None    Assessment:     Ayana Anthony is a 52 y.o. female admitted with a medical diagnosis of Dehydration.  She presents with the following impairments/functional limitations:  weakness, impaired endurance, impaired functional mobility, gait instability, pain . Agreed to mobilize. Requested to use restroom prior to ambulating. Reports having some nausea and vomiting this a.m. after breakfast. Ambulated slowly in hallway , 250' x 2 with rw and CGA. Returned to room to bed.     Rehab Prognosis: Fair; patient would benefit from acute skilled PT services to address these deficits and reach maximum level of function.    Recent Surgery: * No surgery found *      Plan:     During this hospitalization, patient to be seen 6 x/week to address the identified rehab impairments via gait training, therapeutic activities, therapeutic exercises and progress toward the following goals:    Plan of Care Expires:  09/30/22    Subjective     Chief Complaint: fatigue  Patient/Family Comments/goals: to return  home  Pain/Comfort:  Pain Rating 1: other (see comments) (did not rate)  Location 1: abdomen  Pain Addressed 1: Reposition, Nurse notified      Objective:     Communicated with nurse Pretty prior to session.  Patient found supine with bed alarm, peripheral IV, telemetry ([facility] at bedside) upon PT entry to room.     General Precautions: Standard, fall   Orthopedic Precautions:N/A   Braces: N/A  Respiratory Status: Room air     Functional Mobility:  Bed Mobility:     Rolling Left:  supervision  Rolling Right: supervision  Supine to Sit: supervision  Sit to Supine: supervision  Transfers:     Sit to Stand:  contact guard assistance with rolling walker  Gait: 250' x 2 with rw and CGA.      AM-PAC 6 CLICK MOBILITY           Therapeutic Activities and Exercises:   Transferred EOB. Ambulated to restroom with CGA.   Returned to sit EOB to meera clothing and gown.   Ambulated slowly in hallway with rw and CGA.    Patient left supine with all lines intact, call button in reach, bed alarm on, nurse Maria De Jesus notified, and family present..    GOALS:   Multidisciplinary Problems       Physical Therapy Goals          Problem: Physical Therapy    Goal Priority Disciplines Outcome Goal Variances Interventions   Physical Therapy Goal     PT, PT/OT Ongoing, Progressing     Description: Goals to be met by: 2022     Patient will increase functional independence with mobility by performin. Supine to sit with Modified [place]  2. Sit to stand transfer with Stand-by Assistance  3. Bed to chair transfer with Stand-by Assistance using Rolling Walker  4. Gait  x over 500 feet with Stand-by Assistance using Rolling Walker.   5. Lower extremity exercise program x20 reps                        Time Tracking:     PT Received On: 22  PT Start Time: 910     PT Stop Time: 933  PT Total Time (min): 23 min     Billable Minutes: Gait Training 13min and Therapeutic Activity 10min    Treatment Type: Treatment  PT/PTA: PTA     PTA Visit Number: 1     2022

## 2022-08-30 VITALS
OXYGEN SATURATION: 93 % | SYSTOLIC BLOOD PRESSURE: 108 MMHG | TEMPERATURE: 98 F | BODY MASS INDEX: 23.55 KG/M2 | RESPIRATION RATE: 16 BRPM | WEIGHT: 128 LBS | HEART RATE: 109 BPM | HEIGHT: 62 IN | DIASTOLIC BLOOD PRESSURE: 61 MMHG

## 2022-08-30 PROBLEM — Z98.84 HISTORY OF GASTRIC BYPASS: Status: ACTIVE | Noted: 2022-08-30

## 2022-08-30 PROBLEM — E86.0 DEHYDRATION: Status: RESOLVED | Noted: 2022-08-26 | Resolved: 2022-08-30

## 2022-08-30 PROBLEM — R27.0 ATAXIA: Status: RESOLVED | Noted: 2022-03-15 | Resolved: 2022-08-30

## 2022-08-30 LAB
ALBUMIN SERPL BCP-MCNC: 2.9 G/DL (ref 3.5–5.2)
ALP SERPL-CCNC: 71 U/L (ref 55–135)
ALT SERPL W/O P-5'-P-CCNC: 27 U/L (ref 10–44)
ANION GAP SERPL CALC-SCNC: 7 MMOL/L (ref 8–16)
AST SERPL-CCNC: 24 U/L (ref 10–40)
BASOPHILS # BLD AUTO: 0.09 K/UL (ref 0–0.2)
BASOPHILS NFR BLD: 1.4 % (ref 0–1.9)
BILIRUB SERPL-MCNC: 0.2 MG/DL (ref 0.1–1)
BUN SERPL-MCNC: 7 MG/DL (ref 6–20)
CALCIUM SERPL-MCNC: 8.7 MG/DL (ref 8.7–10.5)
CHLORIDE SERPL-SCNC: 110 MMOL/L (ref 95–110)
CO2 SERPL-SCNC: 23 MMOL/L (ref 23–29)
CREAT SERPL-MCNC: 0.9 MG/DL (ref 0.5–1.4)
DIFFERENTIAL METHOD: ABNORMAL
EOSINOPHIL # BLD AUTO: 0.7 K/UL (ref 0–0.5)
EOSINOPHIL NFR BLD: 10.8 % (ref 0–8)
ERYTHROCYTE [DISTWIDTH] IN BLOOD BY AUTOMATED COUNT: 17.5 % (ref 11.5–14.5)
EST. GFR  (NO RACE VARIABLE): >60 ML/MIN/1.73 M^2
GLUCOSE SERPL-MCNC: 80 MG/DL (ref 70–110)
HCT VFR BLD AUTO: 24.3 % (ref 37–48.5)
HGB BLD-MCNC: 7.6 G/DL (ref 12–16)
IMM GRANULOCYTES # BLD AUTO: 0.08 K/UL (ref 0–0.04)
IMM GRANULOCYTES NFR BLD AUTO: 1.3 % (ref 0–0.5)
LYMPHOCYTES # BLD AUTO: 1.8 K/UL (ref 1–4.8)
LYMPHOCYTES NFR BLD: 28.5 % (ref 18–48)
MAGNESIUM SERPL-MCNC: 1.5 MG/DL (ref 1.6–2.6)
MCH RBC QN AUTO: 28 PG (ref 27–31)
MCHC RBC AUTO-ENTMCNC: 31.3 G/DL (ref 32–36)
MCV RBC AUTO: 90 FL (ref 82–98)
MONOCYTES # BLD AUTO: 1.1 K/UL (ref 0.3–1)
MONOCYTES NFR BLD: 16.9 % (ref 4–15)
NEUTROPHILS # BLD AUTO: 2.6 K/UL (ref 1.8–7.7)
NEUTROPHILS NFR BLD: 41.1 % (ref 38–73)
NRBC BLD-RTO: 0 /100 WBC
PATHOLOGIST INTERPRETATION UIFE: NORMAL
PHOSPHATE SERPL-MCNC: 3.4 MG/DL (ref 2.7–4.5)
PLATELET # BLD AUTO: 363 K/UL (ref 150–450)
PMV BLD AUTO: 9.5 FL (ref 9.2–12.9)
POTASSIUM SERPL-SCNC: 4.5 MMOL/L (ref 3.5–5.1)
PROT SERPL-MCNC: 5.8 G/DL (ref 6–8.4)
RBC # BLD AUTO: 2.71 M/UL (ref 4–5.4)
SODIUM SERPL-SCNC: 140 MMOL/L (ref 136–145)
WBC # BLD AUTO: 6.39 K/UL (ref 3.9–12.7)

## 2022-08-30 PROCEDURE — 84100 ASSAY OF PHOSPHORUS: CPT | Performed by: STUDENT IN AN ORGANIZED HEALTH CARE EDUCATION/TRAINING PROGRAM

## 2022-08-30 PROCEDURE — 25000003 PHARM REV CODE 250: Performed by: STUDENT IN AN ORGANIZED HEALTH CARE EDUCATION/TRAINING PROGRAM

## 2022-08-30 PROCEDURE — 85025 COMPLETE CBC W/AUTO DIFF WBC: CPT | Performed by: STUDENT IN AN ORGANIZED HEALTH CARE EDUCATION/TRAINING PROGRAM

## 2022-08-30 PROCEDURE — 94640 AIRWAY INHALATION TREATMENT: CPT

## 2022-08-30 PROCEDURE — 94761 N-INVAS EAR/PLS OXIMETRY MLT: CPT

## 2022-08-30 PROCEDURE — 36415 COLL VENOUS BLD VENIPUNCTURE: CPT | Performed by: STUDENT IN AN ORGANIZED HEALTH CARE EDUCATION/TRAINING PROGRAM

## 2022-08-30 PROCEDURE — 80053 COMPREHEN METABOLIC PANEL: CPT | Performed by: STUDENT IN AN ORGANIZED HEALTH CARE EDUCATION/TRAINING PROGRAM

## 2022-08-30 PROCEDURE — 25000003 PHARM REV CODE 250: Performed by: NURSE PRACTITIONER

## 2022-08-30 PROCEDURE — 97116 GAIT TRAINING THERAPY: CPT | Mod: CQ

## 2022-08-30 PROCEDURE — 83735 ASSAY OF MAGNESIUM: CPT | Performed by: STUDENT IN AN ORGANIZED HEALTH CARE EDUCATION/TRAINING PROGRAM

## 2022-08-30 PROCEDURE — 97535 SELF CARE MNGMENT TRAINING: CPT

## 2022-08-30 PROCEDURE — 63600175 PHARM REV CODE 636 W HCPCS: Performed by: STUDENT IN AN ORGANIZED HEALTH CARE EDUCATION/TRAINING PROGRAM

## 2022-08-30 RX ORDER — FERROUS GLUCONATE 324(37.5)
324 TABLET ORAL
Status: DISCONTINUED | OUTPATIENT
Start: 2022-08-30 | End: 2022-08-30 | Stop reason: HOSPADM

## 2022-08-30 RX ORDER — FERROUS GLUCONATE 324(37.5)
324 TABLET ORAL
Qty: 15 TABLET | Refills: 11 | Status: SHIPPED | OUTPATIENT
Start: 2022-08-30 | End: 2023-08-30

## 2022-08-30 RX ORDER — ONDANSETRON 4 MG/1
8 TABLET, ORALLY DISINTEGRATING ORAL EVERY 6 HOURS PRN
Qty: 30 TABLET | Refills: 2 | Status: SHIPPED | OUTPATIENT
Start: 2022-08-30

## 2022-08-30 RX ORDER — AMOXICILLIN 250 MG
1 CAPSULE ORAL 2 TIMES DAILY PRN
Qty: 30 TABLET | Refills: 2 | Status: SHIPPED | OUTPATIENT
Start: 2022-08-30

## 2022-08-30 RX ORDER — MAGNESIUM SULFATE HEPTAHYDRATE 40 MG/ML
4 INJECTION, SOLUTION INTRAVENOUS ONCE
Status: COMPLETED | OUTPATIENT
Start: 2022-08-30 | End: 2022-08-30

## 2022-08-30 RX ORDER — PROMETHAZINE HYDROCHLORIDE 25 MG/1
25 TABLET ORAL EVERY 6 HOURS PRN
Qty: 30 TABLET | Refills: 0 | Status: SHIPPED | OUTPATIENT
Start: 2022-08-30

## 2022-08-30 RX ADMIN — PYRIDOXINE HCL TAB 50 MG 50 MG: 50 TAB at 08:08

## 2022-08-30 RX ADMIN — ONDANSETRON 8 MG: 2 INJECTION INTRAMUSCULAR; INTRAVENOUS at 08:08

## 2022-08-30 RX ADMIN — DOCUSATE SODIUM AND SENNOSIDES 1 TABLET: 8.6; 5 TABLET, FILM COATED ORAL at 08:08

## 2022-08-30 RX ADMIN — ACETAMINOPHEN 650 MG: 325 TABLET ORAL at 02:08

## 2022-08-30 RX ADMIN — PROMETHAZINE HYDROCHLORIDE 12.5 MG: 25 INJECTION INTRAMUSCULAR; INTRAVENOUS at 01:08

## 2022-08-30 RX ADMIN — LEVOTHYROXINE SODIUM 125 MCG: 0.03 TABLET ORAL at 06:08

## 2022-08-30 RX ADMIN — OXYCODONE HYDROCHLORIDE 30 MG: 10 TABLET ORAL at 08:08

## 2022-08-30 RX ADMIN — MAGNESIUM SULFATE 4 G: 2 INJECTION INTRAVENOUS at 10:08

## 2022-08-30 RX ADMIN — OXCARBAZEPINE 900 MG: 150 TABLET, FILM COATED ORAL at 08:08

## 2022-08-30 RX ADMIN — DULOXETINE 60 MG: 30 CAPSULE, DELAYED RELEASE ORAL at 08:08

## 2022-08-30 RX ADMIN — SERTRALINE HYDROCHLORIDE 200 MG: 50 TABLET ORAL at 08:08

## 2022-08-30 RX ADMIN — OXYCODONE HYDROCHLORIDE AND ACETAMINOPHEN 500 MG: 500 TABLET ORAL at 08:08

## 2022-08-30 RX ADMIN — FLUTICASONE FUROATE AND VILANTEROL TRIFENATATE 1 PUFF: 100; 25 POWDER RESPIRATORY (INHALATION) at 08:08

## 2022-08-30 RX ADMIN — FOLIC ACID 1 MG: 1 TABLET ORAL at 08:08

## 2022-08-30 RX ADMIN — IRON SUCROSE 300 MG: 20 INJECTION, SOLUTION INTRAVENOUS at 06:08

## 2022-08-30 RX ADMIN — CHOLECALCIFEROL TAB 125 MCG (5000 UNIT) 5000 UNITS: 125 TAB at 08:08

## 2022-08-30 NOTE — PLAN OF CARE
Problem: Physical Therapy  Goal: Physical Therapy Goal  Description: Goals to be met by: 2022     Patient will increase functional independence with mobility by performin. Supine to sit with Modified [place]  2. Sit to stand transfer with Stand-by Assistance  3. Bed to chair transfer with Stand-by Assistance using Rolling Walker  4. Gait  x over 500 feet with Stand-by Assistance using Rolling Walker.   5. Lower extremity exercise program x20 reps   Outcome: Ongoing, Progressing   Ambulate with rw and asisstance for safety.

## 2022-08-30 NOTE — NURSING
IV and tele removed. Orders reviewed. Prescriptions sent to pharmacy. Pt escorted to car via w/c and d/c home

## 2022-08-30 NOTE — PT/OT/SLP PROGRESS
Occupational Therapy   Treatment    Name: Ayana Anthony  MRN: [id number]  Admitting Diagnosis:  Dehydration      Recommendations:     Discharge Recommendations: home  Discharge Equipment Recommendations:  None  Barriers to discharge:  None    Assessment:     Ayana Anthony is a 52 y.o. female with a medical diagnosis of Dehydration.  She presents with performance deficits affecting function: impaired endurance, impaired self care skills and impaired functional mobility. Pt was agreeable to participate in OT. Pt performed supine <> sit with SBA. Pt required set up A/SBA with grooming.    Rehab Prognosis:  Good; patient would benefit from acute skilled OT services to address these deficits and reach maximum level of function.       Plan:     Patient to be seen 3 x/week to address the above listed problems via self-care/home management, therapeutic activities, therapeutic exercises  Plan of Care Expires: 09/12/22  Plan of Care Reviewed with: patient    Subjective     Pain/Comfort:  Pain Rating 1: 0/10    Objective:     Communicated with: nurse prior to session.  Patient found HOB elevated upon OT entry to room.    General Precautions: Standard, fall   Orthopedic Precautions:N/A   Braces: N/A  Respiratory Status: Room air     Occupational Performance:     Bed Mobility:    Patient completed Scooting/Bridging with stand by assistance  Patient completed Supine to Sit with stand by assistance  Patient completed Sit to Supine with stand by assistance     Activities of Daily Living:  Feeding:  independence  Grooming: Pt combed her hair, washed her hands, washed her face and brushed her teeth with set up A/stand by assistance and increased time.       Treatment & Education:  Bed mobility: SBA  Grooming: set up A/SBA and increased time    Patient left HOB elevated with all lines intact, call button in reach, bed alarm on and patient's significant other present.    GOALS:   Multidisciplinary Problems       Occupational  Therapy Goals          Problem: Occupational Therapy    Goal Priority Disciplines Outcome Interventions   Occupational Therapy Goal     OT, PT/OT Ongoing, Progressing    Description: Goals to be met by: 9/12/2022     Patient will increase functional independence with ADLs by performing:    UE Dressing with [place].  LE Dressing with Modified [place].  Grooming with Modified [place].  Toileting from toilet with Modified [place] for hygiene and clothing management.   Toilet transfer to toilet with Modified [place].                         Time Tracking:     OT Date of Treatment: 08/30/22  OT Start Time: 0935  OT Stop Time: 1015  OT Total Time (min): 40 min    Billable Minutes:Self Care/Home Management 40               8/30/2022

## 2022-08-30 NOTE — PLAN OF CARE
Problem: Oral Intake Inadequate (Acute Kidney Injury/Impairment)  Goal: Optimal Nutrition Intake  Outcome: Ongoing, Progressing     Problem: Renal Function Impairment (Acute Kidney Injury/Impairment)  Goal: Effective Renal Function  Outcome: Ongoing, Progressing     Problem: Adult Inpatient Plan of Care  Goal: Absence of Hospital-Acquired Illness or Injury  Outcome: Ongoing, Progressing   No acute events overnight. Plan of care reviewed with pt. VSS.  at bedside. Bed in low position with wheels locked, alarm on, and call light within reach.

## 2022-08-30 NOTE — CARE UPDATE
08/29/22 2022   Patient Assessment/Suction   Level of Consciousness (AVPU) alert   Respiratory Effort Unlabored   Expansion/Accessory Muscles/Retractions no use of accessory muscles;no retractions;expansion symmetric   All Lung Fields Breath Sounds Anterior:;Lateral:;clear   Rhythm/Pattern, Respiratory unlabored;pattern regular;depth regular   PRE-TX-O2   O2 Device (Oxygen Therapy) room air   SpO2 98 %   Pulse Oximetry Type Intermittent   $ Pulse Oximetry - Multiple Charge Pulse Oximetry - Multiple   Pulse 92   Resp 16   Aerosol Therapy   $ Aerosol Therapy Charges PRN treatment not required   Respiratory Treatment Status (SVN) PRN treatment not required

## 2022-08-30 NOTE — ASSESSMENT & PLAN NOTE
-IV iron repletion; PO on discharge  -Patient will need colonoscopy if not done so; referral placed on discharge

## 2022-08-30 NOTE — CARE UPDATE
08/30/22 0828   Patient Assessment/Suction   Level of Consciousness (AVPU) alert   Respiratory Effort Normal   Expansion/Accessory Muscles/Retractions expansion symmetric   All Lung Fields Breath Sounds Anterior:   RYAN Breath Sounds clear   RUL Breath Sounds clear   Rhythm/Pattern, Respiratory pattern regular   Cough Frequency no cough   PRE-TX-O2   O2 Device (Oxygen Therapy) room air   Oxygen Concentration (%) 21   SpO2 97 %   Pulse Oximetry Type Intermittent   $ Pulse Oximetry - Multiple Charge Pulse Oximetry - Multiple   Pulse 101   Resp 15   Positioning HOB elevated 45 degrees   Inhaler   $ Inhaler Charges MDI (Metered Dose Inahler) Treatment   Respiratory Treatment Status (Inhaler) given   Treatment Route (Inhaler) mouthpiece   Patient Position (Inhaler) HOB elevated   Signs of Intolerance (Inhaler) none   Breath Sounds Post-Respiratory Treatment   Throughout All Fields Post-Treatment All Fields   Throughout All Fields Post-Treatment no change

## 2022-08-30 NOTE — PT/OT/SLP PROGRESS
Physical Therapy Treatment    Patient Name:  Ayana Anthony   MRN:  [id number]    Recommendations:     Discharge Recommendations:  home   Discharge Equipment Recommendations: walker, rolling   Barriers to discharge: None    Assessment:     Ayana Anthony is a 52 y.o. female admitted with a medical diagnosis of Dehydration.  She presents with the following impairments/functional limitations:  weakness, impaired endurance, impaired functional mobility . Awake, alert, caregiver at bedside. Ambulated 250' x 2 with rw and CGA for safety. Returned to room to bed.    Rehab Prognosis: Fair; patient would benefit from acute skilled PT services to address these deficits and reach maximum level of function.    Recent Surgery: * No surgery found *      Plan:     During this hospitalization, patient to be seen 6 x/week to address the identified rehab impairments via gait training, therapeutic activities, therapeutic exercises and progress toward the following goals:    Plan of Care Expires:  09/30/22    Subjective     Chief Complaint: feeling tired  Patient/Family Comments/goals: to return home  Pain/Comfort:  Pain Rating 1: other (see comments) (did not rate)  Location - Orientation 1: generalized  Location 1: back  Pain Addressed 1: Reposition, Nurse notified      Objective:     Communicated with nurse Gaffney prior to session.  Patient found supine with bed alarm, peripheral IV upon PT entry to room.     General Precautions: Standard, fall   Orthopedic Precautions:N/A   Braces: N/A  Respiratory Status: Room air     Functional Mobility:  Bed Mobility:     Supine to Sit: stand by assistance  Sit to Supine: stand by assistance  Transfers:     Sit to Stand:  contact guard assistance with rolling walker  Gait: 250' x 2 with rw and CGA.      AM-PAC 6 CLICK MOBILITY          Therapeutic Activities and Exercises:   Ambulated with rw and assistance .     Patient left supine with all lines intact, call button in reach, bed alarm on,  nurse Gulshan notified, and caregiver present..    GOALS:   Multidisciplinary Problems       Physical Therapy Goals          Problem: Physical Therapy    Goal Priority Disciplines Outcome Goal Variances Interventions   Physical Therapy Goal     PT, PT/OT Ongoing, Progressing     Description: Goals to be met by: 2022     Patient will increase functional independence with mobility by performin. Supine to sit with Modified [place]  2. Sit to stand transfer with Stand-by Assistance  3. Bed to chair transfer with Stand-by Assistance using Rolling Walker  4. Gait  x over 500 feet with Stand-by Assistance using Rolling Walker.   5. Lower extremity exercise program x20 reps                        Time Tracking:     PT Received On: 22  PT Start Time: 910     PT Stop Time: 922  PT Total Time (min): 12 min     Billable Minutes: Gait Training 12min    Treatment Type: Treatment  PT/PTA: PTA     PTA Visit Number: 2     2022

## 2022-08-30 NOTE — PLAN OF CARE
Problem: Occupational Therapy  Goal: Occupational Therapy Goal  Description: Goals to be met by: 9/12/2022     Patient will increase functional independence with ADLs by performing:    UE Dressing with [place].  LE Dressing with Modified [place].  Grooming with Modified [place].  Toileting from toilet with Modified [place] for hygiene and clothing management.   Toilet transfer to toilet with Modified [place].    Outcome: Ongoing, Progressing

## 2022-08-30 NOTE — PLAN OF CARE
Patient cleared for discharge from case management standpoint.       08/30/22 1108   Final Note   Assessment Type Final Discharge Note   Anticipated Discharge Disposition Home   Hospital Resources/Appts/Education Provided Appointments scheduled and added to AVS;Provided patient/caregiver with written discharge plan information;Provided education on problems/symptoms using teachback

## 2022-08-30 NOTE — DISCHARGE SUMMARY
[facility] Medicine  Discharge Summary      Patient Name: Ayana Anthony  MRN: [id number]  Patient Class: IP- Inpatient  Admission Date: 8/24/2022  Hospital Length of Stay: 4 days  Discharge Date and Time: No discharge date for patient encounter.  Attending Physician: Sunil Edmondson MD   Discharging Provider: Sunil Edmondson MD  Primary Care Provider: Rey Oconnell NP      HPI:   Ayana Anthony is a 52-year-old female with past medical history significant for anemia, chronic kidney disease, hyperkalemia, hypertension, epilepsy, and chronic pain who presented to the emergency department at the direction of her pulmonologist.  She had an abnormal potassium of 2.6 this morning at her pulmonology appointment.  She reports LLQ abdominal pain, left flank pain, decreased urine output and difficulty voiding which has been ongoing for about 2 weeks.  No hematuria or dysuria.  Denies recent fevers or chest pain.  She feels generally weak and is dyspneic on exertion.  Surgical history includes gastric bypass, hysterectomy, cholecystectomy, and abdominoplasty.  Workup in the emergency department is significant for potassium 2.9 and creatinine 1.8.  She will be placed on observation the service of hospital Medicine for continued monitoring and treatment.      * No surgery found *      Hospital Course:   Ayana Anthony is a 52 year old female with a past medical history of ORQUIDEA, HTN, seizure disorder, HTN, asthma, hypothyroidism, MDD/LUCY, gastric bypass, and chronic pain who presented with difficulty urinating, hypotension, weakness, ALEX and hypokalemia secondary to dehydration. She was placed on IV fluids which improved her kidney function and blood pressure. Compression stockings were also ordered. Her potassium was aggressively repleted. Her course was complicated by nausea and vomiting possibly brought on by constipation seen on CT scan. Her constipation did resolve with senna, docusate and  lactulose. She is currently tolerating PO and is using IV antiemetics as needed; with some intermittent nausea. She was discovered to have ORQUIDEA for which IV iron has been started. She will need to have a colonoscopy outpatient; a referral was placed on discharge. She has shown no evidence of gross bleed during her course. She was also referred to General Surgery on discharge as she has had a history of gastric bypass roughly twenty years ago and has had persistent nausea and vomiting. She was able to be discharged 8/30/2022.       Goals of Care Treatment Preferences:  Code Status: Full Code      Consults:     History of gastric bypass  -Referral placed to General Surgery on discharge      Asthma  -Continue home inhalers  -Duonebs PRN      Hypothyroidism  -Continue Synthroid      Vitamin B6 deficiency  -Continue repletion      Chronic pain  -Continue duloxetine  -Continue home opiate regimen      Vitamin D deficiency  -Continue repletion      Iron deficiency anemia  -IV iron repletion; PO on discharge  -Patient will need colonoscopy if not done so; referral placed on discharge      HTN (hypertension)  -Hold home amlodipine given normal/low Bps; discontinued on discharge given normotension      Epilepsy  -Continue home antiepileptics  -Fall, aspiration and seizure precautions      Insomnia  -Remeron QHS      Depression  -Continue sertraline          Final Active Diagnoses:    Diagnosis Date Noted POA    History of gastric bypass [Z98.84] 08/30/2022 Not Applicable    Asthma [J45.909] 08/24/2022 Yes    Hypothyroidism [E03.9] 12/08/2017 Yes    Vitamin B6 deficiency [E53.1] 09/12/2017 Yes    Chronic pain [G89.29] 02/24/2017 Yes    Iron deficiency anemia [D50.9] 04/15/2015 Yes    Vitamin D deficiency [E55.9] 04/15/2015 Yes    HTN (hypertension) [I10] 10/19/2012 Yes     Chronic    Epilepsy [G40.909]  Yes     Chronic    Insomnia [G47.00]  Yes     Chronic    Depression [F32.A]  Yes     Chronic      Problems Resolved  During this Admission:    Diagnosis Date Noted Date Resolved POA    PRINCIPAL PROBLEM:  Dehydration [E86.0] 08/26/2022 08/30/2022 Yes    Opioid abuse [F11.10] 08/27/2022 08/27/2022 Yes    Therapeutic opioid-induced constipation (OIC) [K59.03, T40.2X5A] 08/26/2022 08/27/2022 Yes    Hypokalemia [E87.6] 03/14/2018 08/27/2022 Yes    ALEX (acute kidney injury) [N17.9] 06/02/2016 08/27/2022 Yes       Discharged Condition: stable    Disposition: Home or Self Care    Follow Up:   Follow-up Information     Rey Oconnell NP Follow up in 1 week(s).    Specialty: Family Medicine  [facility] information[facility]  [facility] [place] [id number] [phone]                       Patient Instructions:      Ambulatory referral/consult to General Surgery   Standing Status: Future   Referral Priority: Routine Referral Type: Consultation   Referral Reason: Specialty Services Required   Referred to Provider: ALAINA DAVIDSON Requested Specialty: General Surgery   Number of Visits Requested: 1     Ambulatory referral/consult to Endo Procedure    Standing Status: Future   Referral Priority: Routine Referral Type: Consultation   Number of Visits Requested: 1     Diet Adult Regular     Notify your health care provider if you experience any of the following:  persistent nausea and vomiting or diarrhea     Notify your health care provider if you experience any of the following:  persistent dizziness, light-headedness, or visual disturbances     Activity as tolerated       Significant Diagnostic Studies: Labs: All labs within the past 24 hours have been reviewed    Pending Diagnostic Studies:     None         Medications:  Reconciled Home Medications:      Medication List      START taking these medications    ferrous gluconate 324 mg (37.5 mg iron) Tab tablet  Take 1 tablet (324 mg total) by mouth every 48 hours.     folic acid 1 MG tablet  Commonly known as: FOLVITE  TAKE ONE Tablet BY MOUTH EVERY DAY THANK YOU      ondansetron 4 MG Tbdl  Commonly known as: ZOFRAN-ODT  Take 2 tablets (8 mg total) by mouth every 6 (six) hours as needed (nausea).     senna-docusate 8.6-50 mg 8.6-50 mg per tablet  Commonly known as: PERICOLACE  Take 1 tablet by mouth 2 (two) times daily as needed for Constipation.        CHANGE how you take these medications    promethazine 25 MG tablet  Commonly known as: PHENERGAN  Take 1 tablet (25 mg total) by mouth every 6 (six) hours as needed for Nausea.  What changed:   · when to take this  · reasons to take this        CONTINUE taking these medications    albuterol 90 mcg/actuation inhaler  Commonly known as: PROAIR HFA  Inhale 2 puffs into the lungs every 4 (four) hours as needed for Wheezing or Shortness of Breath.     albuterol-ipratropium 2.5 mg-0.5 mg/3 mL nebulizer solution  Commonly known as: DUO-NEB  Take 3 mLs by nebulization every 6 (six) hours as needed for Wheezing or Shortness of Breath. Rescue     ascorbic acid (vitamin C) 500 MG tablet  Commonly known as: VITAMIN C  Take 500 mg by mouth.     cholecalciferol (vitamin D3) 125 mcg (5,000 unit) Tab  Commonly known as: VITAMIN D3  Take 5,000 Units by mouth once daily.     cyanocobalamin 1,000 mcg/mL injection  Inject 1 mL (1,000 mcg total) into the skin every 28 days.     divalproex 500 MG Tb24  Take 500 mg by mouth every evening.     DULoxetine 60 MG capsule  Commonly known as: CYMBALTA  Take 60 mg by mouth.     ergocalciferol 50,000 unit Cap  Commonly known as: ERGOCALCIFEROL  Take 1 tablet weekly PO x 8 weeks, then every other week thereafter.     levothyroxine 125 MCG tablet  Commonly known as: SYNTHROID  Take 125 mcg by mouth.     mirtazapine 15 MG tablet  Commonly known as: REMERON  Take 1 tablet by mouth every evening.     OXcarbazepine 600 MG Tab  Commonly known as: TRILEPTAL  TAKE 1 & 1/2 TABLETS BY MOUTH TWICE DAILY     oxyCODONE 30 MG Tab  Commonly known as: ROXICODONE  Take 30 mg by mouth 3 (three) times daily as needed.      potassium chloride 10 MEQ Cpsr  Commonly known as: MICRO-K  Take 2 capsules (20 mEq total) by mouth 3 (three) times daily.     pyridoxine (vitamin B6) 50 MG Tab  Commonly known as: B-6  Take 1 tablet (50 mg total) by mouth once daily.     rizatriptan 10 MG tablet  Commonly known as: MAXALT  TAKE 1 TABLET BY MOUTH EVERY DAY AS NEEDED FOR HEADACHE, MAY REPEAT 1 DOSE AFTER 2 HOURS AS NEEDED , DO NOT EXCEED 2 PER DAY     sertraline 100 MG tablet  Commonly known as: ZOLOFT  Take 200 mg by mouth once daily.     SYMBICORT 80-4.5 mcg/actuation Hfaa  Generic drug: budesonide-formoterol 80-4.5 mcg  Inhale 2 puffs into the lungs 2 (two) times daily.        STOP taking these medications    amLODIPine 10 MG tablet  Commonly known as: NORVASC     blood-glucose meter kit     hydroCHLOROthiazide 25 MG tablet  Commonly known as: HYDRODIURIL     ketorolac 10 mg tablet  Commonly known as: TORADOL     metroNIDAZOLE 500 MG tablet  Commonly known as: FLAGYL     predniSONE 20 MG tablet  Commonly known as: DELTASONE     PREMARIN 1.25 MG tablet  Generic drug: estrogens (conjugated)            Indwelling Lines/Drains at time of discharge:   Lines/Drains/Airways     None                 Time spent on the discharge of patient: 33 minutes         Sunil Edmondson MD  Department of Hospital Medicine  [facility]

## 2022-08-31 ENCOUNTER — TELEPHONE (OUTPATIENT)
Dept: MEDSURG UNIT | Facility: HOSPITAL | Age: 52
End: 2022-08-31
Payer: MEDICARE

## 2022-09-01 ENCOUNTER — TELEPHONE (OUTPATIENT)
Dept: MEDSURG UNIT | Facility: HOSPITAL | Age: 52
End: 2022-09-01
Payer: MEDICARE

## 2022-09-06 ENCOUNTER — TELEPHONE (OUTPATIENT)
Dept: HEMATOLOGY/ONCOLOGY | Facility: CLINIC | Age: 52
End: 2022-09-06
Payer: MEDICARE

## 2022-09-06 ENCOUNTER — TELEPHONE (OUTPATIENT)
Dept: MEDSURG UNIT | Facility: HOSPITAL | Age: 52
End: 2022-09-06
Payer: MEDICARE

## 2022-09-06 NOTE — TELEPHONE ENCOUNTER
Outgoing call to patient. Told her that per Dr. Choudhury, she would need to have additional lab work and an appointment before continuing with the IV iron infusions. Patient verbalized understanding. Scheduled the patient for a lab appointment tomorrow and a virtual appointment with MICHELL Joe on Thursday.

## 2022-09-07 ENCOUNTER — LAB VISIT (OUTPATIENT)
Dept: LAB | Facility: CLINIC | Age: 52
End: 2022-09-07
Attending: INTERNAL MEDICINE
Payer: MEDICARE

## 2022-09-07 DIAGNOSIS — D51.8 OTHER VITAMIN B12 DEFICIENCY ANEMIA: ICD-10-CM

## 2022-09-07 DIAGNOSIS — D50.9 IRON DEFICIENCY ANEMIA: ICD-10-CM

## 2022-09-07 LAB
ALBUMIN SERPL BCP-MCNC: 3.3 G/DL (ref 3.5–5.2)
ALP SERPL-CCNC: 80 U/L (ref 55–135)
ALT SERPL W/O P-5'-P-CCNC: 15 U/L (ref 10–44)
ANION GAP SERPL CALC-SCNC: 7 MMOL/L (ref 8–16)
AST SERPL-CCNC: 22 U/L (ref 10–40)
BASOPHILS # BLD AUTO: 0.06 K/UL (ref 0–0.2)
BASOPHILS NFR BLD: 0.9 % (ref 0–1.9)
BILIRUB SERPL-MCNC: 0.1 MG/DL (ref 0.1–1)
BUN SERPL-MCNC: 18 MG/DL (ref 6–20)
CALCIUM SERPL-MCNC: 8.3 MG/DL (ref 8.7–10.5)
CHLORIDE SERPL-SCNC: 106 MMOL/L (ref 95–110)
CO2 SERPL-SCNC: 22 MMOL/L (ref 23–29)
CREAT SERPL-MCNC: 1 MG/DL (ref 0.5–1.4)
DIFFERENTIAL METHOD: ABNORMAL
EOSINOPHIL # BLD AUTO: 0.3 K/UL (ref 0–0.5)
EOSINOPHIL NFR BLD: 4.7 % (ref 0–8)
ERYTHROCYTE [DISTWIDTH] IN BLOOD BY AUTOMATED COUNT: 18.8 % (ref 11.5–14.5)
EST. GFR  (NO RACE VARIABLE): >60 ML/MIN/1.73 M^2
FERRITIN SERPL-MCNC: 187 NG/ML (ref 20–300)
GLUCOSE SERPL-MCNC: 79 MG/DL (ref 70–110)
HCT VFR BLD AUTO: 30.8 % (ref 37–48.5)
HGB BLD-MCNC: 9.5 G/DL (ref 12–16)
IMM GRANULOCYTES # BLD AUTO: 0.24 K/UL (ref 0–0.04)
IMM GRANULOCYTES NFR BLD AUTO: 3.6 % (ref 0–0.5)
IRON SERPL-MCNC: 114 UG/DL (ref 30–160)
LDH SERPL L TO P-CCNC: 173 U/L (ref 110–260)
LYMPHOCYTES # BLD AUTO: 1.4 K/UL (ref 1–4.8)
LYMPHOCYTES NFR BLD: 20.2 % (ref 18–48)
MCH RBC QN AUTO: 28.4 PG (ref 27–31)
MCHC RBC AUTO-ENTMCNC: 30.8 G/DL (ref 32–36)
MCV RBC AUTO: 92 FL (ref 82–98)
MONOCYTES # BLD AUTO: 0.9 K/UL (ref 0.3–1)
MONOCYTES NFR BLD: 13.5 % (ref 4–15)
NEUTROPHILS # BLD AUTO: 3.9 K/UL (ref 1.8–7.7)
NEUTROPHILS NFR BLD: 57.1 % (ref 38–73)
NRBC BLD-RTO: 0 /100 WBC
PLATELET # BLD AUTO: 434 K/UL (ref 150–450)
PMV BLD AUTO: 10.2 FL (ref 9.2–12.9)
POTASSIUM SERPL-SCNC: 5.4 MMOL/L (ref 3.5–5.1)
PROT SERPL-MCNC: 6.4 G/DL (ref 6–8.4)
RBC # BLD AUTO: 3.35 M/UL (ref 4–5.4)
RETICS/RBC NFR AUTO: 3 % (ref 0.5–2.5)
SATURATED IRON: 33 % (ref 20–50)
SODIUM SERPL-SCNC: 135 MMOL/L (ref 136–145)
TOTAL IRON BINDING CAPACITY: 346 UG/DL (ref 250–450)
TRANSFERRIN SERPL-MCNC: 234 MG/DL (ref 200–375)
VIT B12 SERPL-MCNC: 290 PG/ML (ref 210–950)
WBC # BLD AUTO: 6.74 K/UL (ref 3.9–12.7)

## 2022-09-07 PROCEDURE — 85045 AUTOMATED RETICULOCYTE COUNT: CPT | Performed by: INTERNAL MEDICINE

## 2022-09-07 PROCEDURE — 80053 COMPREHEN METABOLIC PANEL: CPT | Performed by: INTERNAL MEDICINE

## 2022-09-07 PROCEDURE — 84466 ASSAY OF TRANSFERRIN: CPT | Performed by: INTERNAL MEDICINE

## 2022-09-07 PROCEDURE — 82728 ASSAY OF FERRITIN: CPT | Performed by: INTERNAL MEDICINE

## 2022-09-07 PROCEDURE — 82607 VITAMIN B-12: CPT | Performed by: INTERNAL MEDICINE

## 2022-09-07 PROCEDURE — 85025 COMPLETE CBC W/AUTO DIFF WBC: CPT | Performed by: INTERNAL MEDICINE

## 2022-09-07 PROCEDURE — 83615 LACTATE (LD) (LDH) ENZYME: CPT | Performed by: INTERNAL MEDICINE

## 2022-09-08 ENCOUNTER — PATIENT MESSAGE (OUTPATIENT)
Dept: HEMATOLOGY/ONCOLOGY | Facility: CLINIC | Age: 52
End: 2022-09-08

## 2022-09-08 ENCOUNTER — TELEPHONE (OUTPATIENT)
Dept: HEMATOLOGY/ONCOLOGY | Facility: CLINIC | Age: 52
End: 2022-09-08
Payer: MEDICARE

## 2022-09-08 ENCOUNTER — OFFICE VISIT (OUTPATIENT)
Dept: HEMATOLOGY/ONCOLOGY | Facility: CLINIC | Age: 52
End: 2022-09-08
Payer: MEDICARE

## 2022-09-08 DIAGNOSIS — D50.0 IRON DEFICIENCY ANEMIA DUE TO CHRONIC BLOOD LOSS: Primary | ICD-10-CM

## 2022-09-08 PROCEDURE — 99213 OFFICE O/P EST LOW 20 MIN: CPT | Mod: 95,,, | Performed by: NURSE PRACTITIONER

## 2022-09-08 PROCEDURE — 99213 PR OFFICE/OUTPT VISIT, EST, LEVL III, 20-29 MIN: ICD-10-PCS | Mod: 95,,, | Performed by: NURSE PRACTITIONER

## 2022-09-08 NOTE — PROGRESS NOTES
The patient location is: home   Visit type: Virtual visit with synchronous audio and video  Face-to-face or time spent with patient on the encounter:30 min  Total time spent on and for  this encounter which includes non face-to-face time preparing to see patient, review of tests, obtaining and or reviewing separately obtained records documenting clinical information in the electronic or other health records, independently interpreting results which is not separately reported ,and communicating results to the patient/family/caregiver and in care coordination and treatment planning/communicating with pharmacy for prescriptions/addressing social needs/arranging follow-up and or referrals :50 min    Each patient I provide medical services by telemedicine is:  (1) informed of the relationship between the physician and patient and the respective role of any other health care provider with respect to management of the patient; and (2) notified that he or she may decline to receive medical services by telemedicine and may withdraw from such care at any time.  This is a video visit therefore some elements of the physical exam such as vital signs, heart sounds are breath sounds are not included and may be included if found in recent clinic notes of other providers assessing same patient. Any symptoms or signs that were visualized were stated by the patient may be included in this note.     HPI    52 years old female with history of vitamin B12 deficiency and gastric bypass surgery chronic pain depression was referred to Hematology Clinic for evaluation iron deficiency anemia.  From outside facility demonstrate a hemoglobin 10.4 grams/deciliter, ferritin 9.62, iron 23, iron saturation 6%.  Patient denies any GI bleeding.    9/8/2022:  She returns today for follow-up is without complaint.  She is status post IV iron      Past Medical History:   Diagnosis Date    B12 deficiency anemia     Chronic pain     CKD (chronic kidney  disease) stage 3, GFR 30-59 ml/min     Depression     Epilepsy     Hematuria     Hypertension     Hypokalemia     Insomnia     Migraines     Proteinuria      Social History     Socioeconomic History    Marital status: Legally    Tobacco Use    Smoking status: Never    Smokeless tobacco: Never   Substance and Sexual Activity    Alcohol use: No    Drug use: No    Sexual activity: Yes     Partners: Male         Subjective    Review of Systems   Constitutional: Negative.    HENT: Negative.     Eyes: Negative.    Respiratory: Negative.     Cardiovascular: Negative.    Gastrointestinal: Negative.    Genitourinary: Negative.    Musculoskeletal: Negative.    Skin: Negative.    Neurological: Negative.    Endo/Heme/Allergies: Negative.    Psychiatric/Behavioral: Negative.          Objective    Physical Exam     There were no vitals filed for this visit.        GENERAL: appears well-built, well-nourished.  No anxiety, no agitation, and in no distress.  Patient is awake, alert, oriented and cooperative.  HEENT:  Showed no congestion. Trachea is central no obvious icterus or pallor noted via video.  NECK:  Supple.  No JVD. No obvious cervical submental or supraclavicular adenopathy.  RS:the visualized portion of  Chest expands well. chest appears symmetric, no audible wheezes.  ABDOMEN: the visualized portion of  abdomen appears undistended.  EXTREMITIES:  Without edema.  NEUROLOGICAL:  The patient is appropriate, higher functions are normal.  No neuro deficits.  No gait abnormality noted.  No confusion, no speech impediment. Cranial nerves are intact and show no deficit. No motor deficits noted through video.  SKIN MUSCULOSKELETAL: no joint or skeletal deformity, ambulating around room, no clubbing of nails.     Lab Results   Component Value Date    WBC 6.74 09/07/2022    HGB 9.5 (L) 09/07/2022    HCT 30.8 (L) 09/07/2022    MCV 92 09/07/2022     09/07/2022       CMP  Sodium   Date Value Ref Range Status    09/07/2022 135 (L) 136 - 145 mmol/L Final     Potassium   Date Value Ref Range Status   09/07/2022 5.4 (H) 3.5 - 5.1 mmol/L Final     Chloride   Date Value Ref Range Status   09/07/2022 106 95 - 110 mmol/L Final     CO2   Date Value Ref Range Status   09/07/2022 22 (L) 23 - 29 mmol/L Final     Glucose   Date Value Ref Range Status   09/07/2022 79 70 - 110 mg/dL Final     BUN   Date Value Ref Range Status   09/07/2022 18 6 - 20 mg/dL Final     Creatinine   Date Value Ref Range Status   09/07/2022 1.0 0.5 - 1.4 mg/dL Final     Calcium   Date Value Ref Range Status   09/07/2022 8.3 (L) 8.7 - 10.5 mg/dL Final     Total Protein   Date Value Ref Range Status   09/07/2022 6.4 6.0 - 8.4 g/dL Final     Albumin   Date Value Ref Range Status   09/07/2022 3.3 (L) 3.5 - 5.2 g/dL Final     Total Bilirubin   Date Value Ref Range Status   09/07/2022 0.1 0.1 - 1.0 mg/dL Final     Comment:     For infants and newborns, interpretation of results should be based  on gestational age, weight and in agreement with clinical  observations.    Premature Infant recommended reference ranges:  Up to 24 hours.............<8.0 mg/dL  Up to 48 hours............<12.0 mg/dL  3-5 days..................<15.0 mg/dL  6-29 days.................<15.0 mg/dL       Alkaline Phosphatase   Date Value Ref Range Status   09/07/2022 80 55 - 135 U/L Final     AST   Date Value Ref Range Status   09/07/2022 22 10 - 40 U/L Final     ALT   Date Value Ref Range Status   09/07/2022 15 10 - 44 U/L Final     Anion Gap   Date Value Ref Range Status   09/07/2022 7 (L) 8 - 16 mmol/L Final     eGFR if    Date Value Ref Range Status   04/12/2018 >60.0 >60 mL/min/1.73 m^2 Final     eGFR if non    Date Value Ref Range Status   04/12/2018 >60.0 >60 mL/min/1.73 m^2 Final     Comment:     Calculation used to obtain the estimated glomerular filtration  rate (eGFR) is the CKD-EPI equation.          Assessment    Iron deficiency anemia:  She received  IV iron in the hospital.  Repeat iron stores showed iron deficiency is resolved.  However, she remains anemic  We will recheck CBC in 4 weeks    B12 deficiency:   B12 was on the low side of normal  Advised patient to start B12 sublingual 1000 mcg daily  There are no diagnoses linked to this encounter.

## 2022-09-13 ENCOUNTER — TELEPHONE (OUTPATIENT)
Dept: PULMONOLOGY | Facility: CLINIC | Age: 52
End: 2022-09-13
Payer: MEDICARE

## 2022-09-13 DIAGNOSIS — J45.51 SEVERE PERSISTENT ASTHMA WITH ACUTE EXACERBATION: Primary | ICD-10-CM

## 2022-09-13 RX ORDER — FLUTICASONE FUROATE, UMECLIDINIUM BROMIDE AND VILANTEROL TRIFENATATE 200; 62.5; 25 UG/1; UG/1; UG/1
1 POWDER RESPIRATORY (INHALATION) DAILY
Qty: 60 EACH | Refills: 11 | Status: SHIPPED | OUTPATIENT
Start: 2022-09-13 | End: 2023-10-16 | Stop reason: SDUPTHER

## 2022-09-13 NOTE — TELEPHONE ENCOUNTER
----- Message from Elizabeth Moctezuma sent at 9/13/2022  1:47 PM CDT -----  Who Called: Patient    What is the reqeust in detail: Requesting call back to know if the trelegy inhaler would be prescribed as discuss in last office visit with NP Jeni Yusuf. Please send to the following pharmacy:    PHARMACY: [facility] [address] [facility] [address] [address]  Phone: [phone] Fax: [phone]    Can the clinic reply by [facility]? NO    Best Call Back Number: [phone]    Additional Information:

## 2022-09-14 ENCOUNTER — TELEPHONE (OUTPATIENT)
Dept: HEMATOLOGY/ONCOLOGY | Facility: CLINIC | Age: 52
End: 2022-09-14
Payer: MEDICARE

## 2022-09-14 ENCOUNTER — PATIENT MESSAGE (OUTPATIENT)
Dept: HEMATOLOGY/ONCOLOGY | Facility: CLINIC | Age: 52
End: 2022-09-14
Payer: MEDICARE

## 2022-09-14 NOTE — TELEPHONE ENCOUNTER
Outgoing call to patient regarding her IV iron appointment today. Voicemail box not set up. This RN sent patient a message through the portal.

## 2022-09-20 ENCOUNTER — TELEPHONE (OUTPATIENT)
Dept: HEMATOLOGY/ONCOLOGY | Facility: CLINIC | Age: 52
End: 2022-09-20
Payer: MEDICARE

## 2022-09-20 ENCOUNTER — PATIENT MESSAGE (OUTPATIENT)
Dept: HEMATOLOGY/ONCOLOGY | Facility: CLINIC | Age: 52
End: 2022-09-20
Payer: MEDICARE

## 2022-09-20 DIAGNOSIS — D50.0 IRON DEFICIENCY ANEMIA DUE TO CHRONIC BLOOD LOSS: ICD-10-CM

## 2022-09-20 DIAGNOSIS — D51.8 OTHER VITAMIN B12 DEFICIENCY ANEMIA: Primary | ICD-10-CM

## 2022-09-20 NOTE — TELEPHONE ENCOUNTER
Outgoing call to patient. Call went to voicemail. Voicemail box not set up. Will send patient a portal message.       ----- Message from Farzana Camara sent at 9/20/2022  1:25 PM CDT -----  Type:Needs Medical Advice    Who Called:PT  Best call back number:[phone]  Additional Info: Requesting a call back regarding, PT would like to change visit to virtual. Please call to confirm.  Please Advise- Thank you

## 2022-09-21 ENCOUNTER — LAB VISIT (OUTPATIENT)
Dept: LAB | Facility: CLINIC | Age: 52
End: 2022-09-21
Payer: MEDICARE

## 2022-09-21 ENCOUNTER — PATIENT MESSAGE (OUTPATIENT)
Dept: PULMONOLOGY | Facility: CLINIC | Age: 52
End: 2022-09-21
Payer: MEDICARE

## 2022-09-21 ENCOUNTER — OFFICE VISIT (OUTPATIENT)
Dept: HEMATOLOGY/ONCOLOGY | Facility: CLINIC | Age: 52
End: 2022-09-21
Payer: MEDICARE

## 2022-09-21 ENCOUNTER — PATIENT MESSAGE (OUTPATIENT)
Dept: GASTROENTEROLOGY | Facility: CLINIC | Age: 52
End: 2022-09-21
Payer: MEDICARE

## 2022-09-21 DIAGNOSIS — E86.0 DEHYDRATION: ICD-10-CM

## 2022-09-21 DIAGNOSIS — D51.8 OTHER VITAMIN B12 DEFICIENCY ANEMIA: ICD-10-CM

## 2022-09-21 DIAGNOSIS — D50.0 IRON DEFICIENCY ANEMIA DUE TO CHRONIC BLOOD LOSS: Primary | ICD-10-CM

## 2022-09-21 DIAGNOSIS — D50.0 IRON DEFICIENCY ANEMIA DUE TO CHRONIC BLOOD LOSS: ICD-10-CM

## 2022-09-21 LAB
ALBUMIN SERPL BCP-MCNC: 4 G/DL (ref 3.5–5.2)
ALP SERPL-CCNC: 94 U/L (ref 55–135)
ALT SERPL W/O P-5'-P-CCNC: 60 U/L (ref 10–44)
ANION GAP SERPL CALC-SCNC: 13 MMOL/L (ref 8–16)
AST SERPL-CCNC: 94 U/L (ref 10–40)
BASOPHILS # BLD AUTO: 0.11 K/UL (ref 0–0.2)
BASOPHILS NFR BLD: 1.2 % (ref 0–1.9)
BILIRUB SERPL-MCNC: 0.3 MG/DL (ref 0.1–1)
BUN SERPL-MCNC: 27 MG/DL (ref 6–20)
CALCIUM SERPL-MCNC: 9.3 MG/DL (ref 8.7–10.5)
CHLORIDE SERPL-SCNC: 108 MMOL/L (ref 95–110)
CO2 SERPL-SCNC: 17 MMOL/L (ref 23–29)
CREAT SERPL-MCNC: 1.9 MG/DL (ref 0.5–1.4)
DIFFERENTIAL METHOD: ABNORMAL
EOSINOPHIL # BLD AUTO: 0.6 K/UL (ref 0–0.5)
EOSINOPHIL NFR BLD: 6.2 % (ref 0–8)
ERYTHROCYTE [DISTWIDTH] IN BLOOD BY AUTOMATED COUNT: 19.1 % (ref 11.5–14.5)
EST. GFR  (NO RACE VARIABLE): 31 ML/MIN/1.73 M^2
FERRITIN SERPL-MCNC: 191 NG/ML (ref 20–300)
GLUCOSE SERPL-MCNC: 56 MG/DL (ref 70–110)
HCT VFR BLD AUTO: 31.5 % (ref 37–48.5)
HGB BLD-MCNC: 9.5 G/DL (ref 12–16)
IMM GRANULOCYTES # BLD AUTO: 0.05 K/UL (ref 0–0.04)
IMM GRANULOCYTES NFR BLD AUTO: 0.5 % (ref 0–0.5)
IRON SERPL-MCNC: 35 UG/DL (ref 30–160)
LYMPHOCYTES # BLD AUTO: 1.9 K/UL (ref 1–4.8)
LYMPHOCYTES NFR BLD: 21 % (ref 18–48)
MCH RBC QN AUTO: 28.8 PG (ref 27–31)
MCHC RBC AUTO-ENTMCNC: 30.2 G/DL (ref 32–36)
MCV RBC AUTO: 96 FL (ref 82–98)
MONOCYTES # BLD AUTO: 1.1 K/UL (ref 0.3–1)
MONOCYTES NFR BLD: 12.3 % (ref 4–15)
NEUTROPHILS # BLD AUTO: 5.4 K/UL (ref 1.8–7.7)
NEUTROPHILS NFR BLD: 58.8 % (ref 38–73)
NRBC BLD-RTO: 0 /100 WBC
PLATELET # BLD AUTO: 406 K/UL (ref 150–450)
PMV BLD AUTO: 9.9 FL (ref 9.2–12.9)
POTASSIUM SERPL-SCNC: 4.3 MMOL/L (ref 3.5–5.1)
PROT SERPL-MCNC: 7.4 G/DL (ref 6–8.4)
RBC # BLD AUTO: 3.3 M/UL (ref 4–5.4)
SATURATED IRON: 10 % (ref 20–50)
SODIUM SERPL-SCNC: 138 MMOL/L (ref 136–145)
TOTAL IRON BINDING CAPACITY: 358 UG/DL (ref 250–450)
TRANSFERRIN SERPL-MCNC: 242 MG/DL (ref 200–375)
VIT B12 SERPL-MCNC: 336 PG/ML (ref 210–950)
WBC # BLD AUTO: 9.1 K/UL (ref 3.9–12.7)

## 2022-09-21 PROCEDURE — 84466 ASSAY OF TRANSFERRIN: CPT | Performed by: NURSE PRACTITIONER

## 2022-09-21 PROCEDURE — 99214 OFFICE O/P EST MOD 30 MIN: CPT | Mod: 95,,, | Performed by: NURSE PRACTITIONER

## 2022-09-21 PROCEDURE — 82728 ASSAY OF FERRITIN: CPT | Performed by: NURSE PRACTITIONER

## 2022-09-21 PROCEDURE — 80053 COMPREHEN METABOLIC PANEL: CPT | Performed by: NURSE PRACTITIONER

## 2022-09-21 PROCEDURE — 99214 PR OFFICE/OUTPT VISIT, EST, LEVL IV, 30-39 MIN: ICD-10-PCS | Mod: 95,,, | Performed by: NURSE PRACTITIONER

## 2022-09-21 PROCEDURE — 82607 VITAMIN B-12: CPT | Performed by: NURSE PRACTITIONER

## 2022-09-21 PROCEDURE — 85025 COMPLETE CBC W/AUTO DIFF WBC: CPT | Performed by: NURSE PRACTITIONER

## 2022-09-21 NOTE — PROGRESS NOTES
The patient location is: home   Visit type: Virtual visit with synchronous audio and video  Face-to-face or time spent with patient on the encounter:30 min  Total time spent on and for  this encounter which includes non face-to-face time preparing to see patient, review of tests, obtaining and or reviewing separately obtained records documenting clinical information in the electronic or other health records, independently interpreting results which is not separately reported ,and communicating results to the patient/family/caregiver and in care coordination and treatment planning/communicating with pharmacy for prescriptions/addressing social needs/arranging follow-up and or referrals :50 min    Each patient I provide medical services by telemedicine is:  (1) informed of the relationship between the physician and patient and the respective role of any other health care provider with respect to management of the patient; and (2) notified that he or she may decline to receive medical services by telemedicine and may withdraw from such care at any time.  This is a video visit therefore some elements of the physical exam such as vital signs, heart sounds are breath sounds are not included and may be included if found in recent clinic notes of other providers assessing same patient. Any symptoms or signs that were visualized were stated by the patient may be included in this note.     HPI    52 years old female with history of vitamin B12 deficiency and gastric bypass surgery chronic pain depression was referred to Hematology Clinic for evaluation iron deficiency anemia.  From outside facility demonstrate a hemoglobin 10.4 grams/deciliter, ferritin 9.62, iron 23, iron saturation 6%.  Patient denies any GI bleeding.    9/8/2022:  She returns today for follow-up is without complaint.  She is status post IV iron    9/21/2022:  She presents today for follow-up visit.  Patient reports history of Sjogren's disease and is  requesting a rheumatology evaluation.  She is complaining of persistent fatigue.  At the time of my visit her labs have not been resulted.  I will notify patient of results and recommendations via portal    Past Medical History:   Diagnosis Date    B12 deficiency anemia     Chronic pain     CKD (chronic kidney disease) stage 3, GFR 30-59 ml/min     Depression     Epilepsy     Hematuria     Hypertension     Hypokalemia     Insomnia     Migraines     Proteinuria      Social History     Socioeconomic History    Marital status: Legally    Tobacco Use    Smoking status: Never    Smokeless tobacco: Never   Substance and Sexual Activity    Alcohol use: No    Drug use: No    Sexual activity: Yes     Partners: Male         Subjective    Review of Systems   Constitutional:  Positive for malaise/fatigue.   HENT: Negative.     Eyes: Negative.    Respiratory: Negative.     Cardiovascular: Negative.    Gastrointestinal: Negative.    Genitourinary: Negative.    Musculoskeletal: Negative.    Skin: Negative.    Neurological: Negative.    Endo/Heme/Allergies: Negative.    Psychiatric/Behavioral: Negative.          Objective    Physical Exam     There were no vitals filed for this visit.        GENERAL: appears well-built, well-nourished.  No anxiety, no agitation, and in no distress.  Patient is awake, alert, oriented and cooperative.  HEENT:  Showed no congestion. Trachea is central no obvious icterus or pallor noted via video.  NECK:  Supple.  No JVD. No obvious cervical submental or supraclavicular adenopathy.  RS:the visualized portion of  Chest expands well. chest appears symmetric, no audible wheezes.  ABDOMEN: the visualized portion of  abdomen appears undistended.  EXTREMITIES:  Without edema.  NEUROLOGICAL:  The patient is appropriate, higher functions are normal.  No neuro deficits.  No gait abnormality noted.  No confusion, no speech impediment. Cranial nerves are intact and show no deficit. No motor deficits  noted through video.  SKIN MUSCULOSKELETAL: no joint or skeletal deformity, ambulating around room, no clubbing of nails.     Lab Results   Component Value Date    WBC 6.74 09/07/2022    HGB 9.5 (L) 09/07/2022    HCT 30.8 (L) 09/07/2022    MCV 92 09/07/2022     09/07/2022       CMP  Sodium   Date Value Ref Range Status   09/07/2022 135 (L) 136 - 145 mmol/L Final     Potassium   Date Value Ref Range Status   09/07/2022 5.4 (H) 3.5 - 5.1 mmol/L Final     Chloride   Date Value Ref Range Status   09/07/2022 106 95 - 110 mmol/L Final     CO2   Date Value Ref Range Status   09/07/2022 22 (L) 23 - 29 mmol/L Final     Glucose   Date Value Ref Range Status   09/07/2022 79 70 - 110 mg/dL Final     BUN   Date Value Ref Range Status   09/07/2022 18 6 - 20 mg/dL Final     Creatinine   Date Value Ref Range Status   09/07/2022 1.0 0.5 - 1.4 mg/dL Final     Calcium   Date Value Ref Range Status   09/07/2022 8.3 (L) 8.7 - 10.5 mg/dL Final     Total Protein   Date Value Ref Range Status   09/07/2022 6.4 6.0 - 8.4 g/dL Final     Albumin   Date Value Ref Range Status   09/07/2022 3.3 (L) 3.5 - 5.2 g/dL Final     Total Bilirubin   Date Value Ref Range Status   09/07/2022 0.1 0.1 - 1.0 mg/dL Final     Comment:     For infants and newborns, interpretation of results should be based  on gestational age, weight and in agreement with clinical  observations.    Premature Infant recommended reference ranges:  Up to 24 hours.............<8.0 mg/dL  Up to 48 hours............<12.0 mg/dL  3-5 days..................<15.0 mg/dL  6-29 days.................<15.0 mg/dL       Alkaline Phosphatase   Date Value Ref Range Status   09/07/2022 80 55 - 135 U/L Final     AST   Date Value Ref Range Status   09/07/2022 22 10 - 40 U/L Final     ALT   Date Value Ref Range Status   09/07/2022 15 10 - 44 U/L Final     Anion Gap   Date Value Ref Range Status   09/07/2022 7 (L) 8 - 16 mmol/L Final     eGFR if    Date Value Ref Range Status    04/12/2018 >60.0 >60 mL/min/1.73 m^2 Final     eGFR if non    Date Value Ref Range Status   04/12/2018 >60.0 >60 mL/min/1.73 m^2 Final     Comment:     Calculation used to obtain the estimated glomerular filtration  rate (eGFR) is the CKD-EPI equation.          Assessment    Iron deficiency anemia:  She received IV iron in the hospital.    I have had the opportunity to review her labs which show iron deficiency  Would recommend proceeding with Injectafer 750 mg IV x2 doses due to suboptimal response to Venofer    Dehydration:   Will arrange 1 L normal saline over 2 hours  Repeat CMP in 2 weeks    Sjogren's disease:  Rheumatology evaluation  Patient concerned that she may have lupus due to intermittent episodes of butterfly rash on face    B12 deficiency:   continue B12 sublingual 1000 mcg daily  There are no diagnoses linked to this encounter.

## 2022-09-21 NOTE — Clinical Note
Arrange  Injectafer 750 mg IV x2dose 1L NS over 2 hr  Repeat CMP in 2 weeks See me in 3 with CBC CMP and iron stores

## 2022-09-22 ENCOUNTER — TELEPHONE (OUTPATIENT)
Dept: HEMATOLOGY/ONCOLOGY | Facility: CLINIC | Age: 52
End: 2022-09-22
Payer: MEDICARE

## 2022-09-22 ENCOUNTER — PATIENT MESSAGE (OUTPATIENT)
Dept: HEMATOLOGY/ONCOLOGY | Facility: CLINIC | Age: 52
End: 2022-09-22
Payer: MEDICARE

## 2022-09-22 ENCOUNTER — TELEPHONE (OUTPATIENT)
Dept: GASTROENTEROLOGY | Facility: CLINIC | Age: 52
End: 2022-09-22
Payer: MEDICARE

## 2022-09-22 DIAGNOSIS — D50.0 IRON DEFICIENCY ANEMIA DUE TO CHRONIC BLOOD LOSS: Primary | ICD-10-CM

## 2022-09-22 PROBLEM — E86.0 DEHYDRATION: Status: ACTIVE | Noted: 2022-09-22

## 2022-09-22 RX ORDER — SODIUM CHLORIDE 9 MG/ML
INJECTION, SOLUTION INTRAVENOUS CONTINUOUS
OUTPATIENT
Start: 2022-09-22

## 2022-09-22 RX ORDER — SODIUM CHLORIDE 0.9 % (FLUSH) 0.9 %
10 SYRINGE (ML) INJECTION
Status: CANCELLED | OUTPATIENT
Start: 2022-09-22

## 2022-09-22 RX ORDER — HEPARIN 100 UNIT/ML
500 SYRINGE INTRAVENOUS
Status: CANCELLED | OUTPATIENT
Start: 2022-09-22

## 2022-09-22 RX ORDER — SODIUM CHLORIDE 0.9 % (FLUSH) 0.9 %
10 SYRINGE (ML) INJECTION
OUTPATIENT
Start: 2022-09-22

## 2022-09-22 RX ORDER — HEPARIN 100 UNIT/ML
5 SYRINGE INTRAVENOUS
OUTPATIENT
Start: 2022-09-22

## 2022-09-22 NOTE — TELEPHONE ENCOUNTER
Outgoing call to [facility] Scheduling. Spoke to Georgiana who scheduled the patient's IV fluids at [facility] tomorrow per MICHELL Glasgow request.

## 2022-10-05 ENCOUNTER — TELEPHONE (OUTPATIENT)
Dept: INFUSION THERAPY | Facility: HOSPITAL | Age: 52
End: 2022-10-05

## 2022-10-11 ENCOUNTER — TELEPHONE (OUTPATIENT)
Dept: HEMATOLOGY/ONCOLOGY | Facility: CLINIC | Age: 52
End: 2022-10-11

## 2022-10-12 ENCOUNTER — PATIENT MESSAGE (OUTPATIENT)
Dept: HEMATOLOGY/ONCOLOGY | Facility: CLINIC | Age: 52
End: 2022-10-12

## 2022-10-12 NOTE — TELEPHONE ENCOUNTER
Sent my chart msg to notify pt appointment today 10/12/22 with Dr Choudhury will need to reschedule due to missing labs.

## 2022-10-13 ENCOUNTER — TELEPHONE (OUTPATIENT)
Dept: HEMATOLOGY/ONCOLOGY | Facility: CLINIC | Age: 52
End: 2022-10-13

## 2022-11-11 ENCOUNTER — PATIENT MESSAGE (OUTPATIENT)
Dept: HEMATOLOGY/ONCOLOGY | Facility: CLINIC | Age: 52
End: 2022-11-11
Payer: MEDICARE

## 2022-11-16 ENCOUNTER — TELEPHONE (OUTPATIENT)
Dept: HEMATOLOGY/ONCOLOGY | Facility: CLINIC | Age: 52
End: 2022-11-16
Payer: MEDICARE

## 2022-11-16 NOTE — TELEPHONE ENCOUNTER
Outgoing call to patient. Per patient she does want to be rescheduled for her missed IV iron appointments. This RN gave patient [phone] to call to reschedule. Patient verbalized understanding.

## 2023-09-01 ENCOUNTER — HOSPITAL ENCOUNTER (EMERGENCY)
Facility: HOSPITAL | Age: 53
Discharge: HOME | End: 2023-09-02
Attending: EMERGENCY MEDICINE
Payer: MEDICARE

## 2023-09-01 DIAGNOSIS — F15.90 AMPHETAMINE MISUSE: ICD-10-CM

## 2023-09-01 DIAGNOSIS — R47.9 SPEECH DISTURBANCE, UNSPECIFIED TYPE: ICD-10-CM

## 2023-09-01 DIAGNOSIS — R41.82 ALTERED MENTAL STATUS, UNSPECIFIED ALTERED MENTAL STATUS TYPE: Primary | ICD-10-CM

## 2023-09-01 LAB
ALBUMIN SERPL BCP-MCNC: 3.5 G/DL (ref 3.5–5.2)
ALP SERPL-CCNC: 59 U/L (ref 55–135)
ALT SERPL W/O P-5'-P-CCNC: 12 U/L (ref 10–44)
AMPHET+METHAMPHET UR QL: ABNORMAL
ANION GAP SERPL CALC-SCNC: 12 MMOL/L (ref 8–16)
AST SERPL-CCNC: 16 U/L (ref 10–40)
BARBITURATES UR QL SCN>200 NG/ML: NEGATIVE
BASOPHILS # BLD AUTO: 0.07 K/UL (ref 0–0.2)
BASOPHILS NFR BLD: 0.8 % (ref 0–1.9)
BENZODIAZ UR QL SCN>200 NG/ML: NEGATIVE
BILIRUB SERPL-MCNC: 0.2 MG/DL (ref 0.1–1)
BUN SERPL-MCNC: 24 MG/DL (ref 6–20)
BZE UR QL SCN: NEGATIVE
CALCIUM SERPL-MCNC: 8.7 MG/DL (ref 8.7–10.5)
CANNABINOIDS UR QL SCN: NEGATIVE
CHLORIDE SERPL-SCNC: 109 MMOL/L (ref 95–110)
CO2 SERPL-SCNC: 18 MMOL/L (ref 23–29)
CREAT SERPL-MCNC: 1.3 MG/DL (ref 0.5–1.4)
CREAT UR-MCNC: 98.1 MG/DL (ref 15–325)
DIFFERENTIAL METHOD: ABNORMAL
EOSINOPHIL # BLD AUTO: 0.3 K/UL (ref 0–0.5)
EOSINOPHIL NFR BLD: 2.9 % (ref 0–8)
ERYTHROCYTE [DISTWIDTH] IN BLOOD BY AUTOMATED COUNT: 13.7 % (ref 11.5–14.5)
EST. GFR  (NO RACE VARIABLE): 49 ML/MIN/1.73 M^2
GLUCOSE SERPL-MCNC: 117 MG/DL (ref 70–110)
HCT VFR BLD AUTO: 30.2 % (ref 37–48.5)
HGB BLD-MCNC: 9.6 G/DL (ref 12–16)
IMM GRANULOCYTES # BLD AUTO: 0.04 K/UL (ref 0–0.04)
IMM GRANULOCYTES NFR BLD AUTO: 0.5 % (ref 0–0.5)
INR PPP: 0.8 (ref 0.8–1.2)
LYMPHOCYTES # BLD AUTO: 2.4 K/UL (ref 1–4.8)
LYMPHOCYTES NFR BLD: 27.8 % (ref 18–48)
MCH RBC QN AUTO: 30.5 PG (ref 27–31)
MCHC RBC AUTO-ENTMCNC: 31.8 G/DL (ref 32–36)
MCV RBC AUTO: 96 FL (ref 82–98)
METHADONE UR QL SCN>300 NG/ML: NEGATIVE
MONOCYTES # BLD AUTO: 0.8 K/UL (ref 0.3–1)
MONOCYTES NFR BLD: 9.8 % (ref 4–15)
NEUTROPHILS # BLD AUTO: 4.9 K/UL (ref 1.8–7.7)
NEUTROPHILS NFR BLD: 58.2 % (ref 38–73)
NRBC BLD-RTO: 0 /100 WBC
OPIATES UR QL SCN: ABNORMAL
PCP UR QL SCN>25 NG/ML: NEGATIVE
PLATELET # BLD AUTO: 319 K/UL (ref 150–450)
PMV BLD AUTO: 9.3 FL (ref 9.2–12.9)
POTASSIUM SERPL-SCNC: 3.7 MMOL/L (ref 3.5–5.1)
PROT SERPL-MCNC: 7.1 G/DL (ref 6–8.4)
PROTHROMBIN TIME: 9.3 SEC (ref 9–12.5)
RBC # BLD AUTO: 3.15 M/UL (ref 4–5.4)
SODIUM SERPL-SCNC: 139 MMOL/L (ref 136–145)
TOXICOLOGY INFORMATION: ABNORMAL
TSH SERPL DL<=0.005 MIU/L-ACNC: 0.69 UIU/ML (ref 0.4–4)
WBC # BLD AUTO: 8.49 K/UL (ref 3.9–12.7)

## 2023-09-01 PROCEDURE — 93010 EKG 12-LEAD: ICD-10-PCS | Mod: ,,, | Performed by: GENERAL PRACTICE

## 2023-09-01 PROCEDURE — 80307 DRUG TEST PRSMV CHEM ANLYZR: CPT | Performed by: PHYSICIAN ASSISTANT

## 2023-09-01 PROCEDURE — 85025 COMPLETE CBC W/AUTO DIFF WBC: CPT | Performed by: PHYSICIAN ASSISTANT

## 2023-09-01 PROCEDURE — 80053 COMPREHEN METABOLIC PANEL: CPT | Performed by: PHYSICIAN ASSISTANT

## 2023-09-01 PROCEDURE — 96360 HYDRATION IV INFUSION INIT: CPT

## 2023-09-01 PROCEDURE — 84443 ASSAY THYROID STIM HORMONE: CPT | Performed by: PHYSICIAN ASSISTANT

## 2023-09-01 PROCEDURE — 80183 DRUG SCRN QUANT OXCARBAZEPIN: CPT | Performed by: EMERGENCY MEDICINE

## 2023-09-01 PROCEDURE — 36415 COLL VENOUS BLD VENIPUNCTURE: CPT | Performed by: EMERGENCY MEDICINE

## 2023-09-01 PROCEDURE — 93005 ELECTROCARDIOGRAM TRACING: CPT

## 2023-09-01 PROCEDURE — 99285 EMERGENCY DEPT VISIT HI MDM: CPT | Mod: 25

## 2023-09-01 PROCEDURE — 85610 PROTHROMBIN TIME: CPT | Performed by: PHYSICIAN ASSISTANT

## 2023-09-01 PROCEDURE — 25000003 PHARM REV CODE 250: Performed by: EMERGENCY MEDICINE

## 2023-09-01 PROCEDURE — 93010 ELECTROCARDIOGRAM REPORT: CPT | Mod: ,,, | Performed by: GENERAL PRACTICE

## 2023-09-01 RX ADMIN — SODIUM CHLORIDE 750 ML: 9 INJECTION, SOLUTION INTRAVENOUS at 11:09

## 2023-09-02 VITALS
HEART RATE: 75 BPM | BODY MASS INDEX: 18.4 KG/M2 | TEMPERATURE: 98 F | RESPIRATION RATE: 16 BRPM | WEIGHT: 100 LBS | HEIGHT: 62 IN | OXYGEN SATURATION: 96 % | SYSTOLIC BLOOD PRESSURE: 114 MMHG | DIASTOLIC BLOOD PRESSURE: 66 MMHG

## 2023-09-02 NOTE — ED PROVIDER NOTES
Encounter Date: 2023       History     Chief Complaint   Patient presents with    Altered Mental Status     Episodes of difficulty speaking, multiple falls, jerking movements x several days; hx epilepsy; last known normal 3 days ago      This patient is a 53-year-old female Past Medical History:  No date: B12 deficiency anemia  No date: Chronic pain  No date: CKD (chronic kidney disease) stage 3, GFR 30-59 ml/min  No date: Depression  No date: Epilepsy  No date: Hematuria  No date: Hypertension  No date: Hypokalemia  No date: Insomnia  No date: Migraines  No date: Proteinuria  Presenting to the emergency department complaints of altered mental status over the last 3 days.  Her fiance endorses she has a past history of seizure disorder and is unsure if she may be having small seizures because she does not lose consciousness but will obtain rapid pressured speech and appears slightly confused at points.  Patient denies a change in strength or sensation in the arms or legs, facial droop, abnormal balance.  She reports increased anxiousness and difficulty getting her words out.  She denies substance abuse.      Review of patient's allergies indicates:   Allergen Reactions    Bactrim [sulfamethoxazole-trimethoprim] Itching    Tramadol     Vancomycin analogues Rash     Past Medical History:   Diagnosis Date    B12 deficiency anemia     Chronic pain     CKD (chronic kidney disease) stage 3, GFR 30-59 ml/min     Depression     Epilepsy     Hematuria     Hypertension     Hypokalemia     Insomnia     Migraines     Proteinuria      Past Surgical History:   Procedure Laterality Date    APPENDECTOMY      BELT ABDOMINOPLASTY      breast implants  2013    BREAST SURGERY Bilateral          SECTION, LOW TRANSVERSE      x3    CHOLECYSTECTOMY      GASTRIC BYPASS      HYSTERECTOMY      REDUCTION MAMMAPLASTY      reduction and implants    TONSILLECTOMY, ADENOIDECTOMY       Family History   Problem Relation Age of  Onset    Diabetes Mother     Hypertension Mother     Heart disease Mother     Cancer Mother         breast    Breast cancer Mother     Kidney disease Father     No Known Problems Sister     No Known Problems Brother     No Known Problems Daughter     No Known Problems Son     Colon cancer Neg Hx     Ovarian cancer Neg Hx      Social History     Tobacco Use    Smoking status: Never    Smokeless tobacco: Never   Substance Use Topics    Alcohol use: No    Drug use: No     Review of Systems   Constitutional:  Negative for fatigue and fever.   HENT:  Negative for congestion.    Respiratory:  Negative for shortness of breath.    Cardiovascular:  Negative for chest pain.   Gastrointestinal:  Negative for abdominal pain.   Genitourinary:  Negative for dysuria.   Musculoskeletal:  Negative for back pain.   Skin:  Negative for rash.   Allergic/Immunologic: Negative for immunocompromised state.   Neurological:  Positive for speech difficulty.   Hematological:  Does not bruise/bleed easily.   Psychiatric/Behavioral:  Negative for confusion.        Physical Exam     Initial Vitals [09/01/23 1837]   BP Pulse Resp Temp SpO2   (!) 141/74 97 20 98.2 °F (36.8 °C) 98 %      MAP       --         Physical Exam    Nursing note and vitals reviewed.  Constitutional: Vital signs are normal. She appears well-nourished.   HENT:   Head: Normocephalic and atraumatic.   Edentulous, no facial droop   Eyes: Conjunctivae and EOM are normal.   Neck: Neck supple. No thyroid mass present.   Normal range of motion.  Cardiovascular:  Normal rate, regular rhythm and normal heart sounds.     Exam reveals no gallop and no friction rub.       No murmur heard.  Pulmonary/Chest: Breath sounds normal. She has no wheezes. She has no rhonchi. She has no rales.   Abdominal: Abdomen is soft. Bowel sounds are normal. There is no abdominal tenderness.   Musculoskeletal:         General: No tenderness or edema. Normal range of motion.      Cervical back: Normal range  of motion and neck supple.      Comments: No lateralizing weakness or sensation deficits     Neurological: She is alert and oriented to person, place, and time. She has normal strength. No cranial nerve deficit or sensory deficit.   Normal heel to shin   Skin: Skin is warm and dry. No rash noted. No erythema.   Psychiatric: She has a normal mood and affect. Her speech is normal. Cognition and memory are normal.         ED Course   Procedures  Labs Reviewed   CBC W/ AUTO DIFFERENTIAL - Abnormal; Notable for the following components:       Result Value    RBC 3.15 (*)     Hemoglobin 9.6 (*)     Hematocrit 30.2 (*)     MCHC 31.8 (*)     All other components within normal limits   COMPREHENSIVE METABOLIC PANEL - Abnormal; Notable for the following components:    CO2 18 (*)     Glucose 117 (*)     BUN 24 (*)     eGFR 49 (*)     All other components within normal limits   DRUG SCREEN PANEL, URINE EMERGENCY - Abnormal; Notable for the following components:    Opiate Scrn, Ur Presumptive Positive (*)     Amphetamine Screen, Ur Presumptive Positive (*)     All other components within normal limits    Narrative:     Specimen Source->Urine   PROTIME-INR   TSH   OXCARBAZEPINE METABOLITE ([facility])     EKG Readings: (Independently Interpreted)   Initial Reading: No STEMI. Previous EKG: Compared with most recent EKG   Sinus rhythm with a first-degree AV block, 92 beats per minute, left axis deviation, no STEMI       Imaging Results              CT Head Without Contrast (Final result)  Result time 09/01/23 21:15:05      Final result by Santos Bolivar MD (09/01/23 21:15:05)                   Impression:      No acute abnormality.      Electronically signed by: Santos Bolivar  Date:    09/01/2023  Time:    21:15               Narrative:    EXAMINATION:  CT HEAD WITHOUT CONTRAST    CLINICAL HISTORY:  Neuro deficit, acute, stroke suspected;    TECHNIQUE:  Low dose axial CT images obtained throughout the head without intravenous  contrast. Sagittal and coronal reconstructions were performed.    COMPARISON:  CT brain, 06/02/2016.    FINDINGS:  Intracranial compartment:    Ventricles and sulci are stable in size for age without evidence of hydrocephalus. No extra-axial blood or fluid collections.    The brain parenchyma appears stable.  No parenchymal mass, hemorrhage, edema or major vascular distribution infarct.    Skull/extracranial contents (limited evaluation): No fracture. Mastoid air cells and paranasal sinuses are essentially clear.                                       Medications   sodium chloride 0.9% bolus 750 mL 750 mL (0 mLs Intravenous Stopped 9/2/23 0045)     Medical Decision Making  This patient was emergently assessed shortly after arrival.  Initial vital signs are stable.  Symptoms have been ongoing for 3 days she is out of the window for tPA or endovascular intervention if symptoms may be associated with stroke.  CT scan obtained and found to be within normal limits for the patient.  Other labs including drug screen panel were obtained and found to be significant for amphetamine screen, the patient takes daily opioid therapy.  All other labs are largely within normal limits.  CT scan of the head was obtained rule out evidence of remote or recent infarct or hemorrhage.  No acute abnormality seen.  On final analysis patient is seen resting comfortably and was observed in the emergency department for several hours.  When questioned about amphetamines, she denies using meth but reports she is been getting Adderall from someone she knows.  She is strongly encouraged against prescription medication diversion and educated that I have low suspicion or symptoms associated with stroke.  She was observed for a long period of time in the emergency department and this morning on final reassessment exhibits normal speech and denies a shin status or any other neuro symptoms.  She is educated that I have low suspicion for TIA or CVA.   Strongly encouraged to follow up with the neurologist and stay away from illicit amphetamine abuse.  She is asked to return to the emergency department immediately for any new, concerning, or worsening symptoms.                               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

## 2023-09-02 NOTE — FIRST PROVIDER EVALUATION
"Medical screening examination initiated.  I have conducted a focused provider triage encounter, findings are as follows:    Brief history of present illness:  patient reports difficulty speaking for three days and gait abnormality. Reports history of epilepsy and reports compliance with medications. No change in symptoms today.     Vitals:    09/01/23 1837   BP: (!) 141/74   Pulse: 97   Resp: 20   Temp: 98.2 °F (36.8 °C)   TempSrc: Oral   SpO2: 98%   Weight: 45.4 kg (100 lb)   Height: 5' 2" (1.575 m)       Pertinent physical exam:  expressive aphasia.     Brief workup plan:  subacute stroke workup    Preliminary workup initiated; this workup will be continued and followed by the physician or advanced practice provider that is assigned to the patient when roomed.  "

## 2023-09-07 LAB — OXCARBAZEPINE METABOLITE: 15 MCG/ML (ref 10–35)

## 2023-10-16 DIAGNOSIS — J45.51 SEVERE PERSISTENT ASTHMA WITH ACUTE EXACERBATION: ICD-10-CM

## 2023-10-16 RX ORDER — FLUTICASONE FUROATE, UMECLIDINIUM BROMIDE AND VILANTEROL TRIFENATATE 200; 62.5; 25 UG/1; UG/1; UG/1
1 POWDER RESPIRATORY (INHALATION) DAILY
Qty: 60 EACH | Refills: 0 | Status: SHIPPED | OUTPATIENT
Start: 2023-10-16

## 2025-01-20 ENCOUNTER — PATIENT MESSAGE (OUTPATIENT)
Dept: OBSTETRICS AND GYNECOLOGY | Facility: CLINIC | Age: 55
End: 2025-01-20